# Patient Record
Sex: FEMALE | Race: WHITE | NOT HISPANIC OR LATINO | Employment: OTHER | URBAN - METROPOLITAN AREA
[De-identification: names, ages, dates, MRNs, and addresses within clinical notes are randomized per-mention and may not be internally consistent; named-entity substitution may affect disease eponyms.]

---

## 2019-08-13 RX ORDER — PENICILLIN V POTASSIUM 250 MG/1
TABLET ORAL
COMMUNITY
End: 2019-08-14 | Stop reason: ALTCHOICE

## 2019-08-13 RX ORDER — HYDROCHLOROTHIAZIDE 12.5 MG/1
CAPSULE, GELATIN COATED ORAL DAILY
COMMUNITY
End: 2019-09-03 | Stop reason: SDUPTHER

## 2019-08-13 RX ORDER — NITROFURANTOIN 25; 75 MG/1; MG/1
CAPSULE ORAL EVERY 12 HOURS
COMMUNITY
End: 2019-08-14 | Stop reason: ALTCHOICE

## 2019-08-13 RX ORDER — FLUTICASONE PROPIONATE 50 MCG
SPRAY, SUSPENSION (ML) NASAL DAILY
COMMUNITY
End: 2019-08-14 | Stop reason: ALTCHOICE

## 2019-08-13 RX ORDER — PREDNISOLONE ACETATE 10 MG/ML
SUSPENSION/ DROPS OPHTHALMIC
COMMUNITY
End: 2019-08-14 | Stop reason: ALTCHOICE

## 2019-08-13 RX ORDER — CLOBETASOL PROPIONATE 0.5 MG/G
OINTMENT TOPICAL
COMMUNITY
End: 2020-07-14 | Stop reason: SDUPTHER

## 2019-08-14 ENCOUNTER — OFFICE VISIT (OUTPATIENT)
Dept: FAMILY MEDICINE CLINIC | Facility: CLINIC | Age: 69
End: 2019-08-14
Payer: COMMERCIAL

## 2019-08-14 VITALS
TEMPERATURE: 97.9 F | HEIGHT: 62 IN | BODY MASS INDEX: 28.89 KG/M2 | DIASTOLIC BLOOD PRESSURE: 70 MMHG | OXYGEN SATURATION: 95 % | HEART RATE: 78 BPM | WEIGHT: 157 LBS | SYSTOLIC BLOOD PRESSURE: 160 MMHG

## 2019-08-14 DIAGNOSIS — Z00.00 MEDICARE ANNUAL WELLNESS VISIT, SUBSEQUENT: ICD-10-CM

## 2019-08-14 DIAGNOSIS — E78.2 MIXED HYPERLIPIDEMIA: Primary | ICD-10-CM

## 2019-08-14 DIAGNOSIS — Z12.31 OTHER SCREENING MAMMOGRAM: ICD-10-CM

## 2019-08-14 DIAGNOSIS — I10 BENIGN ESSENTIAL HYPERTENSION: ICD-10-CM

## 2019-08-14 DIAGNOSIS — Z11.59 NEED FOR HEPATITIS C SCREENING TEST: ICD-10-CM

## 2019-08-14 DIAGNOSIS — R32 URINARY INCONTINENCE, UNSPECIFIED TYPE: ICD-10-CM

## 2019-08-14 DIAGNOSIS — Z12.12 SCREENING FOR COLORECTAL CANCER: ICD-10-CM

## 2019-08-14 DIAGNOSIS — Z12.11 SCREENING FOR COLORECTAL CANCER: ICD-10-CM

## 2019-08-14 PROCEDURE — 1125F AMNT PAIN NOTED PAIN PRSNT: CPT | Performed by: FAMILY MEDICINE

## 2019-08-14 PROCEDURE — 1101F PT FALLS ASSESS-DOCD LE1/YR: CPT | Performed by: FAMILY MEDICINE

## 2019-08-14 PROCEDURE — 1160F RVW MEDS BY RX/DR IN RCRD: CPT | Performed by: FAMILY MEDICINE

## 2019-08-14 PROCEDURE — 1036F TOBACCO NON-USER: CPT | Performed by: FAMILY MEDICINE

## 2019-08-14 PROCEDURE — 1170F FXNL STATUS ASSESSED: CPT | Performed by: FAMILY MEDICINE

## 2019-08-14 PROCEDURE — 99204 OFFICE O/P NEW MOD 45 MIN: CPT | Performed by: FAMILY MEDICINE

## 2019-08-14 PROCEDURE — 3008F BODY MASS INDEX DOCD: CPT | Performed by: FAMILY MEDICINE

## 2019-08-14 PROCEDURE — 3725F SCREEN DEPRESSION PERFORMED: CPT | Performed by: FAMILY MEDICINE

## 2019-08-14 PROCEDURE — G0439 PPPS, SUBSEQ VISIT: HCPCS | Performed by: FAMILY MEDICINE

## 2019-08-14 RX ORDER — AMLODIPINE BESYLATE 5 MG/1
5 TABLET ORAL DAILY
COMMUNITY
End: 2019-09-03 | Stop reason: SDUPTHER

## 2019-08-14 RX ORDER — PRAVASTATIN SODIUM 20 MG
20 TABLET ORAL DAILY
COMMUNITY
End: 2019-09-03 | Stop reason: SDUPTHER

## 2019-08-14 NOTE — PROGRESS NOTES
Kelly Ceballos is here for her Initial Wellness visit  Health Risk Assessment:  Patient rates overall health as excellent  Patient feels that their physical health rating is Same  Eyesight was rated as Same  Hearing was rated as Same  Patient feels that their emotional and mental health rating is Same  Pain experienced by patient in the last 7 days has been None  Patient states that she has experienced no weight loss or gain in last 6 months  Emotional/Mental Health:  Patient has not been feeling nervous/anxious  Broken Bones/Falls: Fall Risk Assessment:    In the past year, patient has experienced: No history of falling in past year          Bladder/Bowel:  Patient has leaked urine accidently in the last six months  Patient reports no loss of bowel control  (Additional Comments: Urine leakage was address and on medication )    Immunizations:  Patient has not had a flu vaccination within the last year  Patient has received a pneumonia shot  Patient has not received a shingles shot  Patient has received tetanus/diphtheria shot  Date of tetanus/diphtheria shot: 11/26/2013    Home Safety:  Patient does not have trouble with stairs inside or outside of their home  Patient currently reports that there are no safety hazards present in home, working smoke alarms, working carbon monoxide detectors  Preventative Screenings:   Breast cancer screening performed, colon cancer screen completed, cholesterol screen completed, glaucoma eye exam completed,     Nutrition:  Current diet: Regular with servings of the following:    Medications:  Patient is currently taking over-the-counter supplements  List of OTC medications includes: multi   Patient is able to manage medications  Lifestyle Choices:  Patient reports no tobacco use  Patient has not smoked or used tobacco in the past   Patient reports alcohol use  Alcohol use per week: socially  Patient drives a vehicle  Patient wears seat belt  Current level of exercise of physical activity described by patient as: moderate  Activities of Daily Living:  Can get out of bed by his or her self, able to dress self, able to make own meals, able to do own shopping, able to bathe self, can do own laundry/housekeeping, can manage own money, pay bills and track expenses    Previous Hospitalizations:  No hospitalization or ED visit in past 12 months        Advanced Directives:  Patient has decided on a power of   Patient has spoken to designated power of   Patient has completed advanced directive  Preventative Screening/Counseling:      Cardiovascular:      General: Risks and Benefits Discussed and Screening Current          Diabetes:      General: Risks and Benefits Discussed      Counseling: Healthy Diet and Healthy Weight      Due for labs: Blood Glucose          Colorectal Cancer:      General: Risks and Benefits Discussed      Due for studies: Fecal Occult Blood          Breast Cancer:      General: Risks and Benefits Discussed          Cervical Cancer:      General: Screening Not Indicated      Comments: No hx abnml periods        Osteoporosis:      General: Risks and Benefits Discussed and Patient Declines      Counseling: Calcium and Vitamin D Intake and Regular Weightbearing Exercise          AAA:      General: Screening Not Indicated          Glaucoma:      General: Screening Not Indicated          HIV:      General: Screening Not Indicated          Hepatitis C:      General: Risks and Benefits Discussed      Counseling: has received general HCV counseling        Advanced Directives:   Patient has living will for healthcare, patient has an advanced directive       Immunizations:  Patient reviewed and up to date

## 2019-08-14 NOTE — PATIENT INSTRUCTIONS
Continue healthy diet and exercise  Obtain fasting labs  Schedule mammo  Do FIT testing  Continue monitoring blood pressure  Obesity   AMBULATORY CARE:   Obesity  is when your body mass index (BMI) is greater than 30  Your healthcare provider will use your height and weight to measure your BMI  The risks of obesity include  many health problems, such as injuries or physical disability  You may need tests to check for the following:  · Diabetes     · High blood pressure or high cholesterol     · Heart disease     · Gallbladder or liver disease     · Cancer of the colon, breast, prostate, liver, or kidney     · Sleep apnea     · Arthritis or gout  Seek care immediately if:   · You have a severe headache, confusion, or difficulty speaking  · You have weakness on one side of your body  · You have chest pain, sweating, or shortness of breath  Contact your healthcare provider if:   · You have symptoms of gallbladder or liver disease, such as pain in your upper abdomen  · You have knee or hip pain and discomfort while walking  · You have symptoms of diabetes, such as intense hunger and thirst, and frequent urination  · You have symptoms of sleep apnea, such as snoring or daytime sleepiness  · You have questions or concerns about your condition or care  Treatment for obesity  focuses on helping you lose weight to improve your health  Even a small decrease in BMI can reduce the risk for many health problems  Your healthcare provider will help you set a weight-loss goal   · Lifestyle changes  are the first step in treating obesity  These include making healthy food choices and getting regular physical activity  Your healthcare provider may suggest a weight-loss program that involves coaching, education, and therapy  · Medicine  may help you lose weight when it is used with a healthy diet and physical activity       · Surgery  can help you lose weight if you are very obese and have other health problems  There are several types of weight-loss surgery  Ask your healthcare provider for more information  Be successful losing weight:   · Set small, realistic goals  An example of a small goal is to walk for 20 minutes 5 days a week  Anther goal is to lose 5% of your body weight  · Tell friends, family members, and coworkers about your goals  and ask for their support  Ask a friend to lose weight with you, or join a weight-loss support group  · Identify foods or triggers that may cause you to overeat , and find ways to avoid them  Remove tempting high-calorie foods from your home and workplace  Place a bowl of fresh fruit on your kitchen counter  If stress causes you to eat, then find other ways to cope with stress  · Keep a diary to track what you eat and drink  Also write down how many minutes of physical activity you do each day  Weigh yourself once a week and record it in your diary  Eating changes: You will need to eat 500 to 1,000 fewer calories each day than you currently eat to lose 1 to 2 pounds a week  The following changes will help you cut calories:  · Eat smaller portions  Use small plates, no larger than 9 inches in diameter  Fill your plate half full of fruits and vegetables  Measure your food using measuring cups until you know what a serving size looks like  · Eat 3 meals and 1 or 2 snacks each day  Plan your meals in advance  Celia Stalls and eat at home most of the time  Eat slowly  · Eat fruits and vegetables at every meal   They are low in calories and high in fiber, which makes you feel full  Do not add butter, margarine, or cream sauce to vegetables  Use herbs to season steamed vegetables  · Eat less fat and fewer fried foods  Eat more baked or grilled chicken and fish  These protein sources are lower in calories and fat than red meat  Limit fast food  Dress your salads with olive oil and vinegar instead of bottled dressing       · Limit the amount of sugar you eat   Do not drink sugary beverages  Limit alcohol  Activity changes:  Physical activity is good for your body in many ways  It helps you burn calories and build strong muscles  It decreases stress and depression, and improves your mood  It can also help you sleep better  Talk to your healthcare provider before you begin an exercise program   · Exercise for at least 30 minutes 5 days a week  Start slowly  Set aside time each day for physical activity that you enjoy and that is convenient for you  It is best to do both weight training and an activity that increases your heart rate, such as walking, bicycling, or swimming  · Find ways to be more active  Do yard work and housecleaning  Walk up the stairs instead of using elevators  Spend your leisure time going to events that require walking, such as outdoor festivals or fairs  This extra physical activity can help you lose weight and keep it off  Follow up with your healthcare provider as directed: You may need to meet with a dietitian  Write down your questions so you remember to ask them during your visits  © 2017 [facility] [facility] Information is for End User's use only and may not be sold, redistributed or otherwise used for commercial purposes  All illustrations and images included in CareNotes® are the copyrighted property of A D A M , Inc  or [employer]  The above information is an  only  It is not intended as medical advice for individual conditions or treatments  Talk to your doctor, nurse or pharmacist before following any medical regimen to see if it is safe and effective for you  Urinary Incontinence   WHAT YOU NEED TO KNOW:   What is urinary incontinence? Urinary incontinence (UI) is when you lose control of your bladder  What causes UI? UI occurs because your bladder cannot store or empty urine properly   The following are the most common types of UI:  · Stress incontinence  is when you leak urine due to increased bladder pressure  This may happen when you cough, sneeze, or exercise  · Urge incontinence  is when you feel the need to urinate right away and leak urine accidentally  · Mixed incontinence  is when you have both stress and urge UI  What are the signs and symptoms of UI?   · You feel like your bladder does not empty completely when you urinate  · You urinate often and need to urinate immediately  · You leak urine when you sleep, or you wake up with the urge to urinate  · You leak urine when you cough, sneeze, exercise, or laugh  How is UI diagnosed? Your healthcare provider will ask how often you leak urine and whether you have stress or urge symptoms  Tell him which medicines you take, how often you urinate, and how much liquid you drink each day  You may need any of the following tests:  · Urine tests  may show infection or kidney function  · A pelvic exam  may be done to check for blockages  A pelvic exam will also show if your bladder, uterus, or other organs have moved out of place  · An x-ray, ultrasound, or CT  may show problems with parts of your urinary system  You may be given contrast liquid to help your organs show up better in the pictures  Tell the healthcare provider if you have ever had an allergic reaction to contrast liquid  Do not enter the MRI room with anything metal  Metal can cause serious injury  Tell the healthcare provider if you have any metal in or on your body  · A bladder scan  will show how much urine is left in your bladder after you urinate  You will be asked to urinate and then healthcare providers will use a small ultrasound machine to check the urine left in your bladder  · Cystometry  is used to check the function of your urinary system  Your healthcare provider checks the pressure in your bladder while filling it with fluid  Your bladder pressure may also be tested when your bladder is full and while you urinate  How is UI treated? · Medicines  can help strengthen your bladder control  · Electrical stimulation  is used to send a small amount of electrical energy to your pelvic floor muscles  This helps control your bladder function  Electrodes may be placed outside your body or in your rectum  For women, the electrodes may be placed in the vagina  · A bulking agent  may be injected into the wall of your urethra to make it thicker  This helps keep your urethra closed and decreases urine leakage  · Devices  such as a clamp, pessary, or tampon may help stop urine leaks  Ask your healthcare provider for more information about these and other devices  · Surgery  may be needed if other treatments do not work  Several types of surgery can help improve your bladder control  Ask your healthcare provider for more information about the surgery you may need  How can I manage my symptoms? · Do pelvic muscle exercises often  Your pelvic muscles help you stop urinating  Squeeze these muscles tight for 5 seconds, then relax for 5 seconds  Gradually work up to squeezing for 10 seconds  Do 3 sets of 15 repetitions a day, or as directed  This will help strengthen your pelvic muscles and improve bladder control  · A catheter  may be used to help empty your bladder  A catheter is a tiny, plastic tube that is put into your bladder to drain your urine  Your healthcare provider may tell you to use a catheter to prevent your bladder from getting too full and leaking urine  · Keep a UI record  Write down how often you leak urine and how much you leak  Make a note of what you were doing when you leaked urine  · Train your bladder  Go to the bathroom at set times, such as every 2 hours, even if you do not feel the urge to go  You can also try to hold your urine when you feel the urge to go  For example, hold your urine for 5 minutes when you feel the urge to go  As that becomes easier, hold your urine for 10 minutes       · Drink liquids as directed  Ask your healthcare provider how much liquid to drink each day and which liquids are best for you  You may need to limit the amount of liquid you drink to help control your urine leakage  Limit or do not have drinks that contain caffeine or alcohol  Do not drink any liquid right before you go to bed  · Prevent constipation  Eat a variety of high-fiber foods  Good examples are high-fiber cereals, beans, vegetables, and whole-grain breads  Prune juice may help make your bowel movement softer  Walking is the best way to trigger your intestines to have a bowel movement  · Exercise regularly and maintain a healthy weight  Ask your healthcare provider how much you should weigh and about the best exercise plan for you  Weight loss and exercise will decrease pressure on your bladder and help you control your leakage  Ask him to help you create a weight loss plan if you are overweight  When should I seek immediate care? · You have severe pain  · You are confused or cannot think clearly  When should I contact my healthcare provider? · You have a fever  · You see blood in your urine  · You have pain when you urinate  · You have new or worse pain, even after treatment  · Your mouth feels dry or you have vision changes  · Your urine is cloudy or smells bad  · You have questions or concerns about your condition or care  CARE AGREEMENT:   You have the right to help plan your care  Learn about your health condition and how it may be treated  Discuss treatment options with your caregivers to decide what care you want to receive  You always have the right to refuse treatment  The above information is an  only  It is not intended as medical advice for individual conditions or treatments  Talk to your doctor, nurse or pharmacist before following any medical regimen to see if it is safe and effective for you    © 2017 Kellie[employer]had[employer] Information is for End User's use only and may not be sold, redistributed or otherwise used for commercial purposes  All illustrations and images included in CareNotes® are the copyrighted property of A D A M , Inc  or [employer]ack [employer]  Cigarette Smoking and Your Health   AMBULATORY CARE:   Risks to your health if you smoke:  Nicotine and other chemicals found in tobacco damage every cell in your body  Even if you are a light smoker, you have an increased risk for cancer, heart disease, and lung disease  If you are pregnant or have diabetes, smoking increases your risk for complications  Benefits to your health if you stop smoking:   · You decrease respiratory symptoms such as coughing, wheezing, and shortness of breath  · You reduce your risk for cancers of the lung, mouth, throat, kidney, bladder, pancreas, stomach, and cervix  If you already have cancer, you increase the benefits of chemotherapy  You also reduce your risk for cancer returning or a second cancer from developing  · You reduce your risk for heart disease, blood clots, heart attack, and stroke  · You reduce your risk for lung infections, and diseases such as pneumonia, asthma, chronic bronchitis, and emphysema  · Your circulation improves  More oxygen can be delivered to your body  If you have diabetes, you lower your risk for complications, such as kidney, artery, and eye diseases  You also lower your risk for nerve damage  Nerve damage can lead to amputations, poor vision, and blindness  · You improve your body's ability to heal and to fight infections  Benefits to the health of others if you stop smoking:  Tobacco is harmful to nonsmokers who breathe in your secondhand smoke  The following are ways the health of others around you may improve when you stop smoking:  · You lower the risks for lung cancer and heart disease in nonsmoking adults       · If you are pregnant, you lower the risk for miscarriage, early delivery, low birth weight, and stillbirth  You also lower your baby's risk for SIDS, obesity, developmental delay, and neurobehavioral problems, such as ADHD  · If you have children, you lower their risk for ear infections, colds, pneumonia, bronchitis, and asthma  For more information and support to stop smoking:   · Smokefree  gov  Phone: 2- 521 - 775-4325  Web Address: [employer]  Follow up with your healthcare provider as directed:  Write down your questions so you remember to ask them during your visits  © 2017 [address] [facility] Information is for End User's use only and may not be sold, redistributed or otherwise used for commercial purposes  All illustrations and images included in CareNotes® are the copyrighted property of A D A M , Inc  or [employer]  The above information is an  only  It is not intended as medical advice for individual conditions or treatments  Talk to your doctor, nurse or pharmacist before following any medical regimen to see if it is safe and effective for you  Fall Prevention   AMBULATORY CARE:   Fall prevention  includes ways to make your home and other areas safer  It also includes ways you can move more carefully to prevent a fall  Health conditions that cause changes in your blood pressure, vision, or muscle strength and coordination may increase your risk for falls  Medicines may also increase your risk for falls if they make you dizzy, weak, or sleepy  Call 911 or have someone else call if:   · You have fallen and are unconscious  · You have fallen and cannot move part of your body  Contact your healthcare provider if:   · You have fallen and have pain or a headache  · You have questions or concerns about your condition or care  Fall prevention tips:   · Stand or sit up slowly  This may help you keep your balance and prevent falls  · Use assistive devices as directed    Your healthcare provider may suggest that you use a cane or walker to help you keep your balance  You may need to have grab bars put in your bathroom near the toilet or in the shower  · Wear shoes that fit well and have soles that   Wear shoes both inside and outside  Use slippers with good   Do not wear shoes with high heels  · Wear a personal alarm  This is a device that allows you to call 911 if you fall and need help  Ask your healthcare provider for more information  · Stay active  Exercise can help strengthen your muscles and improve your balance  Your healthcare provider may recommend water aerobics or walking  He or she may also recommend physical therapy to improve your coordination  Never start an exercise program without talking to your healthcare provider first      · Manage your medical conditions  Keep all appointments with your healthcare providers  Visit your eye doctor as directed  Home safety tips:   · Add items to prevent falls in the bathroom  Put nonslip strips on your bath or shower floor to prevent you from slipping  Use a bath mat if you do not have carpet in the bathroom  This will prevent you from falling when you step out of the bath or shower  Use a shower seat so you do not need to stand while you shower  Sit on the toilet or a chair in your bathroom to dry yourself and put on clothing  This will prevent you from losing your balance from drying or dressing yourself while you are standing  · Keep paths clear  Remove books, shoes, and other objects from walkways and stairs  Place cords for telephones and lamps out of the way so that you do not need to walk over them  Tape them down if you cannot move them  Remove small rugs  If you cannot remove a rug, secure it with double-sided tape  This will prevent you from tripping  · Install bright lights in your home  Use night lights to help light paths to the bathroom or kitchen  Always turn on the light before you start walking      · Keep items you use often on shelves within reach  Do not use a step stool to help you reach an item  · Paint or place reflective tape on the edges of your stairs  This will help you see the stairs better  Follow up with your healthcare provider as directed:  Write down your questions so you remember to ask them during your visits  © 2017 [address] [employer] Information is for End User's use only and may not be sold, redistributed or otherwise used for commercial purposes  All illustrations and images included in CareNotes® are the copyrighted property of A D A M , Inc  or [employer]ack Real  The above information is an  only  It is not intended as medical advice for individual conditions or treatments  Talk to your doctor, nurse or pharmacist before following any medical regimen to see if it is safe and effective for you  Advance Directives   WHAT YOU NEED TO KNOW:   What are advance directives? Advance directives are legal documents that state your wishes and plans for medical care  These plans are made ahead of time in case you lose your ability to make decisions for yourself  Advance directives can apply to any medical decision, such as the treatments you want, and if you want to donate organs  What are the types of advance directives? There are many types of advance directives, and each state has rules about how to use them  You may choose a combination of any of the following:  · Living will: This is a written record of the treatment you want  You can also choose which treatments you do not want, which to limit, and which to stop at a certain time  This includes surgery, medicine, IV fluid, and tube feedings  · Durable power of  for healthcare [facility]): This is a written record that states who you want to make healthcare choices for you when you are unable to make them for yourself  This person, called a proxy, is usually a family member or a friend   You may choose more than 1 proxy     · Do not resuscitate (DNR) order:  A DNR order is used in case your heart stops beating or you stop breathing  It is a request not to have certain forms of treatment, such as CPR  A DNR order may be included in other types of advance directives  · Medical directive: This covers the care that you want if you are in a coma, near death, or unable to make decisions for yourself  You can list the treatments you want for each condition  Treatment may include pain medicine, surgery, blood transfusions, dialysis, IV or tube feedings, and a ventilator (breathing machine)  · Values history: This document has questions about your views, beliefs, and how you feel and think about life  This information can help others choose the care that you would choose  Why are advance directives important? An advance directive helps you control your care  Although spoken wishes may be used, it is better to have your wishes written down  Spoken wishes can be misunderstood, or not followed  Treatments may be given even if you do not want them  An advance directive may make it easier for your family to make difficult choices about your care  How do I decide what to put in my advance directives? · Make informed decisions:  Make sure you fully understand treatments or care you may receive  Think about the benefits and problems your decisions could cause for you or your family  Talk to healthcare providers if you have concerns or questions before you write down your wishes  You may also want to talk with your Judaism or , or a   Check your state laws to make sure that what you put in your advance directive is legal      · Sign all forms:  Sign and date your advance directive when you have finished  You may also need 2 witnesses to sign the forms  Witnesses cannot be your doctor or his staff, your spouse, heirs or beneficiaries, people you owe money to, or your chosen proxy   Talk to your family, proxy, and healthcare providers about your advance directive  Give each person a copy, and keep one for yourself in a place you can get to easily  Do not keep it hidden or locked away  · Review and revise your plans: You can revise your advance directive at any time, as long as you are able to make decisions  Review your plan every year, and when there are changes in your life, or your health  When you make changes, let your family, proxy, and healthcare providers know  Give each a new copy  Where can I find more information? · [employer]  [address] , [address]  Phone: 8- 278 - 805-3732  Phone: 1- 170 - 070-6377  Web Address: http://www  aafp org  · [address] [facility])  [address] [facility], [address]  Phone: 2- 194 - [phone]  Phone: [phone]  Web Address: Francois chavez  CARE AGREEMENT:   You have the right to help plan your care  To help with this plan, you must learn about your health condition and treatment options  You must also learn about advance directives and how they are used  Work with your healthcare providers to decide what care will be used to treat you  You always have the right to refuse treatment  The above information is an  only  It is not intended as medical advice for individual conditions or treatments  Talk to your doctor, nurse or pharmacist before following any medical regimen to see if it is safe and effective for you  © 2017 [address] [facility] Information is for End User's use only and may not be sold, redistributed or otherwise used for commercial purposes  All illustrations and images included in CareNotes® are the copyrighted property of A D A M , Inc  or Zack Real

## 2019-08-14 NOTE — PROGRESS NOTES
Assessment/Plan:    No problem-specific Assessment & Plan notes found for this encounter  Diagnoses and all orders for this visit:    Mixed hyperlipidemia  Comments:  check lipids  on pravastatin for now    Orders:  -     Lipid panel; Future    Benign essential hypertension  Comments:  known white coat HTN  checks pressures at home, and all are <140/90  continue current meds  Labs as ordered    Orders:  -     CBC and differential; Future  -     Comprehensive metabolic panel; Future  -     TSH, 3rd generation; Future    Urinary incontinence, unspecified type  Comments:  doing well on myrbetriq  continue same      Other orders  -     Discontinue: nitrofurantoin (MACROBID) 100 mg capsule; every 12 (twelve) hours  -     Discontinue: penicillin V potassium (VEETID) 250 mg tablet; penicillin V potassium 250 mg tablet  -     Discontinue: prednisoLONE acetate (PRED FORTE) 1 % ophthalmic suspension; prednisolone acetate 1 % eye drops,suspension  -     Discontinue: fluticasone (FLONASE) 50 mcg/act nasal spray; Daily  -     hydrochlorothiazide (MICROZIDE) 12 5 mg capsule; Daily  -     Discontinue: tuberculin (TUBERSOL) 5 units/0 1 mL; 0 1 mL Daily  -     clobetasol (TEMOVATE) 0 05 % ointment; clobetasol 0 05 % topical ointment  -     Cancel: Ambulatory referral to Colorectal Surgery; Future  -     amLODIPine (NORVASC) 5 mg tablet; Take 5 mg by mouth daily  -     Mirabegron ER (MYRBETRIQ) 50 MG TB24; Take 50 mg by mouth daily  -     pravastatin (PRAVACHOL) 20 mg tablet; Take 20 mg by mouth daily  -     aspirin 81 MG tablet; Take 81 mg by mouth Daily        Subjective:      Patient ID: Germain Santa is a 77 y o  female  HPI   pt presents for new pt visit and medicare wellness  +hx HLD, HTN which is well-controlled  Monitors blood pressure at home  Always high at doctor's office  No chest pain, shortness of breath  Tolerating meds  On myrbetriq for incontinence and frequency    She feels this works as well as anything she has used in the past and wants to continue same dosing  No dysuria, back pain  The following portions of the patient's history were reviewed and updated as appropriate: allergies, current medications, past family history, past medical history, past social history, past surgical history and problem list     Review of Systems   Constitutional: Negative for chills, fatigue, fever and unexpected weight change  HENT: Negative for congestion, ear pain, hearing loss, postnasal drip, rhinorrhea, sinus pressure, sinus pain, sore throat, trouble swallowing and voice change  Eyes: Negative for pain, redness and visual disturbance  Respiratory: Negative for cough and shortness of breath  Cardiovascular: Negative for chest pain, palpitations and leg swelling  Gastrointestinal: Negative for abdominal pain, constipation, diarrhea and nausea  Endocrine: Negative for cold intolerance, heat intolerance, polydipsia and polyuria  Genitourinary: Negative for dysuria, frequency and urgency  Musculoskeletal: Negative for arthralgias, joint swelling and myalgias  Skin: Negative for rash  No suspicious lesions   Neurological: Negative for weakness, numbness and headaches  Hematological: Negative for adenopathy  Objective:      /70 (BP Location: Left arm)   Pulse 78   Temp 97 9 °F (36 6 °C)   Ht 5' 2 05" (1 576 m)   Wt 71 2 kg (157 lb)   SpO2 95%   BMI 28 67 kg/m²          Physical Exam   Constitutional: She is oriented to person, place, and time  She appears well-developed and well-nourished  No distress  HENT:   Head: Normocephalic and atraumatic  Right Ear: Tympanic membrane, external ear and ear canal normal    Left Ear: Tympanic membrane, external ear and ear canal normal    Nose: Nose normal    Mouth/Throat: Oropharynx is clear and moist and mucous membranes are normal  No oropharyngeal exudate  Eyes: Pupils are equal, round, and reactive to light   Conjunctivae and EOM are normal    Neck: No JVD present  Carotid bruit is not present  No thyromegaly present  Cardiovascular: Regular rhythm, S1 normal and S2 normal  Exam reveals no gallop, no S3, no S4 and no friction rub  No murmur heard  Pulmonary/Chest: Effort normal and breath sounds normal  She has no wheezes  She has no rhonchi  She has no rales  Abdominal: Soft  Bowel sounds are normal  She exhibits no distension  There is no tenderness  Lymphadenopathy:     She has no cervical adenopathy  Neurological: She is alert and oriented to person, place, and time  She has normal strength and normal reflexes  No cranial nerve deficit or sensory deficit

## 2019-09-03 DIAGNOSIS — I10 ESSENTIAL HYPERTENSION: Primary | ICD-10-CM

## 2019-09-03 DIAGNOSIS — E78.2 MIXED HYPERLIPIDEMIA: ICD-10-CM

## 2019-09-03 RX ORDER — HYDROCHLOROTHIAZIDE 12.5 MG/1
12.5 CAPSULE, GELATIN COATED ORAL EVERY MORNING
Qty: 90 CAPSULE | Refills: 1 | Status: SHIPPED | OUTPATIENT
Start: 2019-09-03 | End: 2020-02-18 | Stop reason: SDUPTHER

## 2019-09-03 RX ORDER — AMLODIPINE BESYLATE 5 MG/1
5 TABLET ORAL DAILY
Qty: 90 TABLET | Refills: 1 | Status: SHIPPED | OUTPATIENT
Start: 2019-09-03 | End: 2020-02-18 | Stop reason: SDUPTHER

## 2019-09-03 RX ORDER — PRAVASTATIN SODIUM 20 MG
20 TABLET ORAL DAILY
Qty: 90 TABLET | Refills: 1 | Status: SHIPPED | OUTPATIENT
Start: 2019-09-03 | End: 2020-02-18 | Stop reason: SDUPTHER

## 2019-09-03 NOTE — TELEPHONE ENCOUNTER
Medication: Norvasc, Hydrochlorothiazide, Pravastatin   Last office visit: 8/14/19  Next office visit: Not due for 1 year   Labs: N/A  Last refilled: historical   Pharmacy: on file

## 2019-09-12 ENCOUNTER — APPOINTMENT (OUTPATIENT)
Dept: LAB | Facility: CLINIC | Age: 69
End: 2019-09-12
Payer: COMMERCIAL

## 2019-09-12 DIAGNOSIS — Z12.12 SCREENING FOR COLORECTAL CANCER: ICD-10-CM

## 2019-09-12 DIAGNOSIS — E78.2 MIXED HYPERLIPIDEMIA: ICD-10-CM

## 2019-09-12 DIAGNOSIS — Z12.11 SCREENING FOR COLORECTAL CANCER: ICD-10-CM

## 2019-09-12 DIAGNOSIS — I10 BENIGN ESSENTIAL HYPERTENSION: ICD-10-CM

## 2019-09-12 DIAGNOSIS — Z11.59 NEED FOR HEPATITIS C SCREENING TEST: ICD-10-CM

## 2019-09-12 LAB
ALBUMIN SERPL BCP-MCNC: 4.1 G/DL (ref 3.5–5)
ALP SERPL-CCNC: 75 U/L (ref 46–116)
ALT SERPL W P-5'-P-CCNC: 21 U/L (ref 12–78)
ANION GAP SERPL CALCULATED.3IONS-SCNC: 13 MMOL/L (ref 4–13)
AST SERPL W P-5'-P-CCNC: 16 U/L (ref 5–45)
BASOPHILS # BLD AUTO: 0.02 THOUSANDS/ΜL (ref 0–0.1)
BASOPHILS NFR BLD AUTO: 0 % (ref 0–1)
BILIRUB SERPL-MCNC: 0.5 MG/DL (ref 0.2–1)
BUN SERPL-MCNC: 11 MG/DL (ref 5–25)
CALCIUM SERPL-MCNC: 9.5 MG/DL (ref 8.3–10.1)
CHLORIDE SERPL-SCNC: 99 MMOL/L (ref 100–108)
CHOLEST SERPL-MCNC: 201 MG/DL (ref 50–200)
CO2 SERPL-SCNC: 30 MMOL/L (ref 21–32)
CREAT SERPL-MCNC: 0.82 MG/DL (ref 0.6–1.3)
EOSINOPHIL # BLD AUTO: 0.18 THOUSAND/ΜL (ref 0–0.61)
EOSINOPHIL NFR BLD AUTO: 4 % (ref 0–6)
ERYTHROCYTE [DISTWIDTH] IN BLOOD BY AUTOMATED COUNT: 12 % (ref 11.6–15.1)
GFR SERPL CREATININE-BSD FRML MDRD: 74 ML/MIN/1.73SQ M
GLUCOSE P FAST SERPL-MCNC: 96 MG/DL (ref 65–99)
HCT VFR BLD AUTO: 42.8 % (ref 34.8–46.1)
HCV AB SER QL: NORMAL
HDLC SERPL-MCNC: 77 MG/DL (ref 40–60)
HEMOCCULT STL QL IA: NEGATIVE
HGB BLD-MCNC: 13.9 G/DL (ref 11.5–15.4)
IMM GRANULOCYTES # BLD AUTO: 0.01 THOUSAND/UL (ref 0–0.2)
IMM GRANULOCYTES NFR BLD AUTO: 0 % (ref 0–2)
LDLC SERPL CALC-MCNC: 103 MG/DL (ref 0–100)
LYMPHOCYTES # BLD AUTO: 1.15 THOUSANDS/ΜL (ref 0.6–4.47)
LYMPHOCYTES NFR BLD AUTO: 25 % (ref 14–44)
MCH RBC QN AUTO: 29.8 PG (ref 26.8–34.3)
MCHC RBC AUTO-ENTMCNC: 32.5 G/DL (ref 31.4–37.4)
MCV RBC AUTO: 92 FL (ref 82–98)
MONOCYTES # BLD AUTO: 0.58 THOUSAND/ΜL (ref 0.17–1.22)
MONOCYTES NFR BLD AUTO: 13 % (ref 4–12)
NEUTROPHILS # BLD AUTO: 2.63 THOUSANDS/ΜL (ref 1.85–7.62)
NEUTS SEG NFR BLD AUTO: 58 % (ref 43–75)
NONHDLC SERPL-MCNC: 124 MG/DL
NRBC BLD AUTO-RTO: 0 /100 WBCS
PLATELET # BLD AUTO: 262 THOUSANDS/UL (ref 149–390)
PMV BLD AUTO: 9.9 FL (ref 8.9–12.7)
POTASSIUM SERPL-SCNC: 3.6 MMOL/L (ref 3.5–5.3)
PROT SERPL-MCNC: 7.6 G/DL (ref 6.4–8.2)
RBC # BLD AUTO: 4.66 MILLION/UL (ref 3.81–5.12)
SODIUM SERPL-SCNC: 142 MMOL/L (ref 136–145)
TRIGL SERPL-MCNC: 103 MG/DL
TSH SERPL DL<=0.05 MIU/L-ACNC: 2.19 UIU/ML (ref 0.36–3.74)
WBC # BLD AUTO: 4.57 THOUSAND/UL (ref 4.31–10.16)

## 2019-09-12 PROCEDURE — 85025 COMPLETE CBC W/AUTO DIFF WBC: CPT

## 2019-09-12 PROCEDURE — 80053 COMPREHEN METABOLIC PANEL: CPT

## 2019-09-12 PROCEDURE — 86803 HEPATITIS C AB TEST: CPT

## 2019-09-12 PROCEDURE — 36415 COLL VENOUS BLD VENIPUNCTURE: CPT

## 2019-09-12 PROCEDURE — 80061 LIPID PANEL: CPT

## 2019-09-12 PROCEDURE — 84443 ASSAY THYROID STIM HORMONE: CPT

## 2019-09-12 PROCEDURE — G0328 FECAL BLOOD SCRN IMMUNOASSAY: HCPCS

## 2019-10-16 DIAGNOSIS — R32 URINARY INCONTINENCE, UNSPECIFIED TYPE: ICD-10-CM

## 2019-10-16 DIAGNOSIS — I10 BENIGN ESSENTIAL HYPERTENSION: Primary | ICD-10-CM

## 2019-10-16 NOTE — TELEPHONE ENCOUNTER
Medication: Myrbetriq 50 mg   Last refilled: 8/14/19  Last Office Visit: 8/14/419  Next Office Visit: None   Pharmacy:

## 2019-12-17 ENCOUNTER — HOSPITAL ENCOUNTER (OUTPATIENT)
Dept: RADIOLOGY | Facility: HOSPITAL | Age: 69
Discharge: HOME | End: 2019-12-17
Attending: FAMILY MEDICINE
Payer: COMMERCIAL

## 2019-12-17 ENCOUNTER — OFFICE VISIT (OUTPATIENT)
Dept: FAMILY MEDICINE CLINIC | Facility: CLINIC | Age: 69
End: 2019-12-17
Payer: COMMERCIAL

## 2019-12-17 VITALS
SYSTOLIC BLOOD PRESSURE: 136 MMHG | HEART RATE: 90 BPM | DIASTOLIC BLOOD PRESSURE: 80 MMHG | OXYGEN SATURATION: 98 % | WEIGHT: 157.2 LBS | BODY MASS INDEX: 28.93 KG/M2 | TEMPERATURE: 97.9 F | HEIGHT: 62 IN | RESPIRATION RATE: 12 BRPM

## 2019-12-17 DIAGNOSIS — M25.551 RIGHT HIP PAIN: Primary | ICD-10-CM

## 2019-12-17 DIAGNOSIS — M25.551 RIGHT HIP PAIN: ICD-10-CM

## 2019-12-17 PROCEDURE — 3008F BODY MASS INDEX DOCD: CPT | Performed by: FAMILY MEDICINE

## 2019-12-17 PROCEDURE — 99213 OFFICE O/P EST LOW 20 MIN: CPT | Performed by: FAMILY MEDICINE

## 2019-12-17 PROCEDURE — 1160F RVW MEDS BY RX/DR IN RCRD: CPT | Performed by: FAMILY MEDICINE

## 2019-12-17 PROCEDURE — 73502 X-RAY EXAM HIP UNI 2-3 VIEWS: CPT

## 2019-12-17 PROCEDURE — 1036F TOBACCO NON-USER: CPT | Performed by: FAMILY MEDICINE

## 2019-12-17 NOTE — PROGRESS NOTES
Assessment/Plan:    No problem-specific Assessment & Plan notes found for this encounter  Diagnoses and all orders for this visit:    Right hip pain  Comments:  nml exam--suspect strain  check xray  refer to PT; if no improvement, refer to ortho  Orders:  -     XR hip/pelv 2-3 vws left if performed; Future  -     Ambulatory referral to Physical Therapy; Future        Subjective:      Patient ID: Nataliia Young is a 77 y o  female  HPI  L hip pain x3 months  Pain is achy and worse at night  Isn't present all the time  Pain isn't worse with weight-bearing  Felt a little unstable initially, but that sensation is gone  She has moved and is using more stairs and is more physically active  Started the gym in September as well  Ibuprofen at night has been mildly helpful but not fully  No paresthesias or weakness    The following portions of the patient's history were reviewed and updated as appropriate: allergies, current medications, past family history, past medical history, past social history, past surgical history and problem list     Review of Systems    See HPI; all other systems negative  Objective:      /80   Pulse 90   Temp 97 9 °F (36 6 °C)   Resp 12   Ht 5' 2 05" (1 576 m)   Wt 71 3 kg (157 lb 3 2 oz)   SpO2 98%   BMI 28 71 kg/m²          Physical Exam   Constitutional: She is oriented to person, place, and time  Musculoskeletal: Normal range of motion  She exhibits no edema or deformity  Neurological: She is alert and oriented to person, place, and time  She displays normal reflexes  No sensory deficit

## 2020-01-16 ENCOUNTER — EVALUATION (OUTPATIENT)
Dept: PHYSICAL THERAPY | Facility: CLINIC | Age: 70
End: 2020-01-16
Payer: COMMERCIAL

## 2020-01-16 DIAGNOSIS — M70.62 GREATER TROCHANTERIC BURSITIS OF LEFT HIP: Primary | ICD-10-CM

## 2020-01-16 DIAGNOSIS — M25.552 LEFT HIP PAIN: ICD-10-CM

## 2020-01-16 PROCEDURE — 97162 PT EVAL MOD COMPLEX 30 MIN: CPT | Performed by: PHYSICAL THERAPIST

## 2020-01-16 PROCEDURE — 97110 THERAPEUTIC EXERCISES: CPT | Performed by: PHYSICAL THERAPIST

## 2020-01-16 NOTE — PROGRESS NOTES
PT Evaluation     Today's date: 2020  Patient name: Ronald Cabral  : 1950  MRN: [id number]  Referring provider: Cira Moctezuma DO  Dx:   Encounter Diagnosis     ICD-10-CM    1  Greater trochanteric bursitis of left hip M70 62    2  Left hip pain M25 552 Ambulatory referral to Physical Therapy    nml exam--suspect strain  check xray  refer to PT; if no improvement, refer to ortho                  Assessment  Assessment details: Pt presents with signs and symptoms synonymous of L hip greater trochanteric bursitis  Pt presents with pain, decreased strength, decreased flexibility as well as tolerance to activity and postural awareness  Pt would benefit skilled PT intervention in order to address these impairments in order to be able to perform all desired activities with minimal to nil symptom exacerbation  Thank you very much for this referral      Impairments: abnormal or restricted ROM, activity intolerance, impaired balance, impaired physical strength, lacks appropriate home exercise program, pain with function and poor posture   Understanding of Dx/Px/POC: good   Prognosis: good    Goals  STG 4 Weeks:  Decrease pain at worst to 4/10  Improve Flexibility to (-) LAINEY  Improve strength to 4- hip, TA Draw 20"  Independent with HEP  LTG 8 Weeks:  Decrease pain at worst to 2/10  Improve strength to 4/5 hip   TA draw 30"  Able to perform all desired activities with minimal to nil symptom exacerbation      Plan  Patient would benefit from: skilled physical therapy  Planned modality interventions: cryotherapy, TENS and thermotherapy: hydrocollator packs  Planned therapy interventions: joint mobilization, manual therapy, abdominal trunk stabilization, neuromuscular re-education, patient education, postural training, strengthening, stretching, therapeutic activities, therapeutic exercise, home exercise program, graded motor, graded exercise, graded activity, gait training, functional ROM exercises and flexibility  Frequency: 2x week  Duration in weeks: 8  Treatment plan discussed with: patient        Subjective Evaluation    History of Present Illness  Date of onset: 2020  Mechanism of injury: Pt is a 72 yofemale who is presents today stating that she developed L hip pain that developed insidiously about 4 months ago  Pt reports she thinks there could be 2 reasons, she moved to the area last summer and began exercising in September and then in the new home that has steps where she didn't have them before  Pt reports that the she held off on seeing medical care as it progressively got so bad, she entered the month of November where the pain became so intense she was nervous to even begin initiating walking up stairs without using the uninvolved injury  The holidays rolled through and she met with her family physician right before [place] who encouraged to get x-ray and try PT intervention  Pt reports that X-ray was done and she was only positive with for mild degenerative process but nothing significant  Pt reports that there are periods of time without pain but at worst she will take medication and it will ache her up to a 6/10  She reports that that it initially started as a belt around her low back and wrapped around L > R, but not seem to be pretty focal on the outside of her hip  Pt reports pain at worst is caused by laying on the L side, and performing stairs  Pt reports her goals at this time are to be able to return to recreational exercise, get rid of her pain  No numbness or tingling in her legs and no change in bowel or bladder  Pt denies having difficulty falling asleep because of this but has not recently waken up because of it  Pt reports having a cyst removal on L1-2 in , but denies other history of back pain     Quality of life: good    Pain  Current pain ratin  At best pain ratin  At worst pain ratin  Quality: dull ache  Relieving factors: medications and change in position  Aggravating factors: walking and stair climbing  Progression: improved      Diagnostic Tests  X-ray: normal  Treatments  Previous treatment: medication  Patient Goals  Patient goals for therapy: decreased pain, increased motion, increased strength and return to sport/leisure activities          Objective     Active Range of Motion     Lumbar   Flexion: 60 degrees   Extension: 10 degrees   Left lateral flexion: 5 degrees       Right lateral flexion: 5 degrees   Left rotation: 75 degrees   Right rotation: 75 degrees     Additional Active Range of Motion Details  Forward head, rounded shoulders, mild decrease Lordosis  B/L Sensation intact to L3,4,5,S1,S2  Postural correction no pain  Repeated motion   Flex  Ext nil pain  Nil pain  Table OP   SB R  SB L  LE Strength  Hip   L Flex 4- Ext 5 Abd 3+ Add 4  R Flex 4- Ext 5 Abd 3+ Add 4  LE Screen strong and painless  Hip ROM  L ER 45 IR 45 flex 120  R  ER 45 IR 45 Flex 120  Joint Mobility: L1-3 G3, L3-S1 G4  No pain  Palpation: L GT (bursa)   STs   L + Radha, + [place] string  (-) SLR, (-) Prince/Fader  (-) Distraction  (-) [place] Range  R + Radha + bow string    TA draw 11"         Flowsheet Rows      Most Recent Value   PT/OT G-Codes   Current Score  68   Projected Score  74             Precautions: Osteopenia, HTN    Daily Treatment Diary       Manual 1/16            Jacknobber to glute med/GT region gentle                                                                 Exercise Diary             Treadmill UE support             TA draw/Postural review 5 mins            Bridges 2 x 10            ITB ST with Strap 20" x 4            SLR Abd 2 x 10            LS Ext 10            SLR Flex             Tband Side step  YTB           Tband 3 way hip   YTB           Tband Row + Ext   RTB           Mini Squat                                                                                                                                               Modalities             CP to L Hip Prn

## 2020-01-21 ENCOUNTER — OFFICE VISIT (OUTPATIENT)
Dept: PHYSICAL THERAPY | Facility: CLINIC | Age: 70
End: 2020-01-21
Payer: COMMERCIAL

## 2020-01-21 DIAGNOSIS — M70.62 GREATER TROCHANTERIC BURSITIS OF LEFT HIP: Primary | ICD-10-CM

## 2020-01-21 DIAGNOSIS — M25.552 LEFT HIP PAIN: ICD-10-CM

## 2020-01-21 PROCEDURE — 97110 THERAPEUTIC EXERCISES: CPT

## 2020-01-21 PROCEDURE — 97140 MANUAL THERAPY 1/> REGIONS: CPT

## 2020-01-21 NOTE — PROGRESS NOTES
Daily Note     Today's date: 2020  Patient name: Lindsey Olivo  : 1950  MRN: [id number]  Referring provider: Moncho Hardy DO  Dx:   Encounter Diagnosis     ICD-10-CM    1  Greater trochanteric bursitis of left hip M70 62    2  Left hip pain M25 552                   Subjective: patient states that she has been compliant with HEP   Has noticed some anterior hip pain to begin today's treatment session  Objective: See treatment diary below      Assessment: Tolerated treatment well  Patient exhibited good technique with therapeutic exercises      Plan: Continue per plan of care        Precautions: Osteopenia, HTN    Daily Treatment Diary       Manual              Genet Young to glute med/GT region gentle  8                                                               Exercise Diary             Treadmill UE support  5 min            TA draw/Postural review 5 mins            Bridges 2 x 10 2 x 10            ITB ST with Strap 20" x 4 :20x 4            SLR Abd 2 x 10 2 x 10            LS Ext 10            SLR Flex  2 x 10            Tband Side step  YTB 5 laps           Tband 3 way hip   YTB 2 x 10           Tband Row + Ext   RTB NV           Mini Squat                                                                                                                                               Modalities             CP to L Hip Prn  declined

## 2020-01-23 ENCOUNTER — OFFICE VISIT (OUTPATIENT)
Dept: PHYSICAL THERAPY | Facility: CLINIC | Age: 70
End: 2020-01-23
Payer: COMMERCIAL

## 2020-01-23 DIAGNOSIS — M70.62 GREATER TROCHANTERIC BURSITIS OF LEFT HIP: Primary | ICD-10-CM

## 2020-01-23 DIAGNOSIS — M25.552 LEFT HIP PAIN: ICD-10-CM

## 2020-01-23 PROCEDURE — 97110 THERAPEUTIC EXERCISES: CPT | Performed by: PHYSICAL THERAPIST

## 2020-01-23 PROCEDURE — 97112 NEUROMUSCULAR REEDUCATION: CPT | Performed by: PHYSICAL THERAPIST

## 2020-01-23 PROCEDURE — 97140 MANUAL THERAPY 1/> REGIONS: CPT | Performed by: PHYSICAL THERAPIST

## 2020-01-23 NOTE — PROGRESS NOTES
Daily Note     Today's date: 2020  Patient name: Valery Boyd  : 1950  MRN: [id number]  Referring provider: Kathy Chaney DO  Dx:   Encounter Diagnosis     ICD-10-CM    1  Greater trochanteric bursitis of left hip M70 62    2  Left hip pain M25 552                   Subjective: Pt reports that she has been feeling really well, she has been able to sleep on involved side and has not had any pain over the past few days, mild awareness  Objective: See treatment diary below      Assessment:  Asymptomatic t/o entirety  Continue to progress as able with reps and resistance  Plan: Continue per plan of care        Precautions: Osteopenia, HTN    Daily Treatment Diary       Manual           [place] Gavel to glute med/GT region gentle  8 10 min                                                               Exercise Diary             Treadmill UE support  5 min  6 min          TA draw/Postural review 5 mins            Bridges 2 x 10 2 x 10  2 x 10          ITB ST with Strap 20" x 4 :20x 4  20" x 4          SLR Abd 2 x 10 2 x 10  2 x 10          LS Ext 10  10x           SLR Flex  2 x 10  2 x 10          Tband Side step  YTB 5 laps RTB x4           Tband 3 way hip   YTB 2 x 10 RTB 2 x 10           Tband Row + Ext   RTB NV 2 x 10          Mini Squat   nv                                                                                                                                            Modalities             CP to L Hip Prn  declined dec

## 2020-01-30 ENCOUNTER — OFFICE VISIT (OUTPATIENT)
Dept: PHYSICAL THERAPY | Facility: CLINIC | Age: 70
End: 2020-01-30
Payer: COMMERCIAL

## 2020-01-30 DIAGNOSIS — M70.62 GREATER TROCHANTERIC BURSITIS OF LEFT HIP: Primary | ICD-10-CM

## 2020-01-30 DIAGNOSIS — M25.552 LEFT HIP PAIN: ICD-10-CM

## 2020-01-30 PROCEDURE — 97140 MANUAL THERAPY 1/> REGIONS: CPT | Performed by: PHYSICAL THERAPIST

## 2020-01-30 PROCEDURE — 97110 THERAPEUTIC EXERCISES: CPT | Performed by: PHYSICAL THERAPIST

## 2020-01-30 NOTE — PROGRESS NOTES
Daily Note     Today's date: 2020  Patient name: Дмитрий Osuna  : 1950  MRN: [id number]  Referring provider: Elliott Perez DO  Dx:   Encounter Diagnosis     ICD-10-CM    1  Greater trochanteric bursitis of left hip M70 62    2  Left hip pain M25 552                   Subjective:  Pt presents today stating that she still has mild pain with elevations but otherwise she has been without pain and content with progress  Objective: See treatment diary below      Assessment:  Asymptomatic t/o entirety  Proceeded with mini squats to further encourage trunk strength and mobility tolerance enhancement  Plan: Continue per plan of care        Precautions: Osteopenia, HTN    Daily Treatment Diary       Manual          Dorla Shires to glute med/GT region gentle  8 10 min  10 min                                                             Exercise Diary             Treadmill UE support  5 min  6 min 6 min         TA draw/Postural review 5 mins            Bridges 2 x 10 2 x 10  2 x 10 2 x 10         ITB ST with Strap 20" x 4 :20x 4  20" x 4 20" x 4         SLR Abd 2 x 10 2 x 10  2 x 10 2 x 10         LS Ext 10  10x  10x         SLR Flex  2 x 10  2 x 10 2 x 10         Tband Side step  YTB 5 laps RTB x4  RTB x 4         Tband 3 way hip   YTB 2 x 10 RTB 2 x 10  RTB x 4         Tband Row + Ext   RTB NV 2 x 10 RTB 2 x 10         Mini Squat   nv 2x 10                                                                                                                                           Modalities             CP to L Hip Prn  declined dec  dec

## 2020-02-06 ENCOUNTER — OFFICE VISIT (OUTPATIENT)
Dept: PHYSICAL THERAPY | Facility: CLINIC | Age: 70
End: 2020-02-06
Payer: COMMERCIAL

## 2020-02-06 DIAGNOSIS — M25.552 LEFT HIP PAIN: ICD-10-CM

## 2020-02-06 DIAGNOSIS — M70.62 GREATER TROCHANTERIC BURSITIS OF LEFT HIP: Primary | ICD-10-CM

## 2020-02-06 PROCEDURE — 97140 MANUAL THERAPY 1/> REGIONS: CPT | Performed by: PHYSICAL THERAPIST

## 2020-02-06 PROCEDURE — 97110 THERAPEUTIC EXERCISES: CPT | Performed by: PHYSICAL THERAPIST

## 2020-02-06 NOTE — PROGRESS NOTES
Daily Note     Today's date: 2020  Patient name: Charbel Griffith  : 1950  MRN: [id number]  Referring provider: Melvi Shabazz DO  Dx:   Encounter Diagnosis     ICD-10-CM    1  Greater trochanteric bursitis of left hip M70 62    2  Left hip pain M25 552                   Subjective:  Pt presents today stating that she woke up to some L hip pain, but otherwise has had several days with no limitation  Objective: See treatment diary below      Assessment:    Mild challenge with today's session due soreness that may be residual from waking up this AM   RE n v      Plan: Continue per plan of care        Precautions: Osteopenia, HTN    Daily Treatment Diary       Manual         [place] Papa to glute med/GT region gentle  8 10 min  10 min 10 min                                                            Exercise Diary             Treadmill UE support  5 min  6 min 6 min 6 min        TA draw/Postural review 5 mins            Bridges 2 x 10 2 x 10  2 x 10 2 x 10 2 x 10        ITB ST with Strap 20" x 4 :20x 4  20" x 4 20" x 4 20" x 4        SLR Abd 2 x 10 2 x 10  2 x 10 2 x 10 2 x 10        LS Ext 10  10x  10x 10x        SLR Flex  2 x 10  2 x 10 2 x 10 2 x 10        Tband Side step  YTB 5 laps RTB x4  RTB x 4 RTB x 4        Tband 3 way hip   YTB 2 x 10 RTB 2 x 10  RTB x 4 GTB 2 x 10        Tband Row + Ext   RTB NV 2 x 10 RTB 2 x 10 RTB 2 x 10        Mini Squat   nv 2x 10 2 x 10                                                                                                                                          Modalities             CP to L Hip Prn  declined dec  dec Dec

## 2020-02-13 ENCOUNTER — EVALUATION (OUTPATIENT)
Dept: PHYSICAL THERAPY | Facility: CLINIC | Age: 70
End: 2020-02-13
Payer: COMMERCIAL

## 2020-02-13 DIAGNOSIS — E78.2 MIXED HYPERLIPIDEMIA: ICD-10-CM

## 2020-02-13 DIAGNOSIS — I10 ESSENTIAL HYPERTENSION: ICD-10-CM

## 2020-02-13 DIAGNOSIS — M70.62 GREATER TROCHANTERIC BURSITIS OF LEFT HIP: Primary | ICD-10-CM

## 2020-02-13 DIAGNOSIS — M25.552 LEFT HIP PAIN: ICD-10-CM

## 2020-02-13 PROCEDURE — 97140 MANUAL THERAPY 1/> REGIONS: CPT | Performed by: PHYSICAL THERAPIST

## 2020-02-13 RX ORDER — HYDROCHLOROTHIAZIDE 12.5 MG/1
CAPSULE, GELATIN COATED ORAL
Qty: 90 CAPSULE | Refills: 1 | OUTPATIENT
Start: 2020-02-13

## 2020-02-13 RX ORDER — PRAVASTATIN SODIUM 20 MG
TABLET ORAL
Qty: 90 TABLET | Refills: 1 | OUTPATIENT
Start: 2020-02-13

## 2020-02-13 RX ORDER — AMLODIPINE BESYLATE 5 MG/1
TABLET ORAL
Qty: 90 TABLET | Refills: 1 | OUTPATIENT
Start: 2020-02-13

## 2020-02-13 NOTE — PROGRESS NOTES
PT Evaluation     Today's date: 2020  Patient name: Sherie Gonsales  : 1950  MRN: [id number]  Referring provider: Franck Flores DO  Dx:   Encounter Diagnosis     ICD-10-CM    1  Greater trochanteric bursitis of left hip M70 62    2  Left hip pain M25 552                   Assessment  Assessment details: Pt at this time demonstrates significant improvement in range, strength, flexibility and over all tolerance to activity  Pt at this time demonstrates independence with her HEP and is likely safe to DC to HEP  Whil pain levels are less frequent but just as intense, this is promising that the physiological factors will carry over with transition to HEP so her biological and chemical changes of pain will begin to dissipate  If symptoms don't improve she is welcome to return as needed however another aspect of today's DC was a component involving her Insurance copay  Thank you very much for this kind and motivated referral       Impairments: abnormal or restricted ROM, activity intolerance, impaired balance, impaired physical strength, lacks appropriate home exercise program, pain with function and poor posture   Understanding of Dx/Px/POC: good   Prognosis: good    Goals  STG 4 Weeks:  Decrease pain at worst to 4/10  Improve Flexibility to (-) LAINEY -met  Improve strength to 4- hip, TA Draw 20" -met  Independent with HEP -met  LTG 8 Weeks:  Decrease pain at worst to 2/10 -  Improve strength to 4/5 hip   TA draw 30" -met  Able to perform all desired activities with minimal to nil symptom exacerbation -partial       Plan  Patient would benefit from: skilled physical therapy  Planned modality interventions: cryotherapy, TENS and thermotherapy: hydrocollator packs  Planned therapy interventions: joint mobilization, manual therapy, abdominal trunk stabilization, neuromuscular re-education, patient education, postural training, strengthening, stretching, therapeutic activities, therapeutic exercise, home exercise program, graded motor, graded exercise, graded activity, gait training, functional ROM exercises and flexibility  Duration in weeks: 6  Treatment plan discussed with: patient        Subjective Evaluation    History of Present Illness  Date of onset: 2020  Mechanism of injury: Pt reports as a whole she has found improvement as a week picture, but she states that the pain at worst is still about 6/10 at worst   Less frequent, but the few times that she has had this occur, it has only been assisted by medication  Pt reports that she would like to make today her last day and transition to HEP and recreation exercise as she has been compliant and understands what she is experiencing currently  Pt reports that she does not wish further referral, or injection series as she dislikes pushy physicians  Quality of life: good    Pain  Current pain ratin  At best pain ratin  At worst pain ratin  Quality: dull ache  Relieving factors: medications and change in position  Aggravating factors: walking and stair climbing  Progression: improved      Diagnostic Tests  X-ray: normal  Treatments  Previous treatment: medication  Patient Goals  Patient goals for therapy: decreased pain, increased motion, increased strength and return to sport/leisure activities          Objective     Active Range of Motion     Lumbar   Flexion: 70 degrees   Extension: 20 degrees   Left lateral flexion: 15 degrees       Right lateral flexion: 15 degrees   Left rotation: 100 degrees   Right rotation: 100 degrees     Additional Active Range of Motion Details  Forward head, rounded shoulders, mild decrease Lordosis  B/L Sensation intact to L3,4,5,S1,S2  Postural correction no pain  Repeated motion   Flex  Ext nil pain  Nil pain  Table OP   SB R  SB L  LE Strength  Hip   L Flex 4 Ext 5 Abd 4 Add 5  R Flex 4 Ext 5 Abd 4 Add 5   LE Screen strong and painless     Hip ROM  L ER 45 IR 55 flex 120  R  ER 45 IR 55 Flex 120  Joint Mobility: L1-3 G3, L3-S1 G4  No pain  Palpation: L GT (bursa) (remainsS)   STs   L - Radha, - [place] string  (-) SLR, (-) Prince/Fader  (-) Distraction  (-) Luisito Lara  R - Radha - bow string    TA draw 25"                Precautions: Osteopenia, HTN    Daily Treatment Diary       Manual 1/16 1/21 1/23 1/30 2/6 2/13       Jacknobber to glute med/GT region gentle  8 10 min  10 min 10 min              25 min Assessment                                              Exercise Diary             Treadmill UE support  5 min  6 min 6 min 6 min        TA draw/Postural review 5 mins            Bridges 2 x 10 2 x 10  2 x 10 2 x 10 2 x 10        ITB ST with Strap 20" x 4 :20x 4  20" x 4 20" x 4 20" x 4        SLR Abd 2 x 10 2 x 10  2 x 10 2 x 10 2 x 10        LS Ext 10  10x  10x 10x        SLR Flex  2 x 10  2 x 10 2 x 10 2 x 10        Tband Side step  YTB 5 laps RTB x4  RTB x 4 RTB x 4        Tband 3 way hip   YTB 2 x 10 RTB 2 x 10  RTB x 4 GTB 2 x 10        Tband Row + Ext   RTB NV 2 x 10 RTB 2 x 10 RTB 2 x 10        Mini Squat   nv 2x 10 2 x 10                                                                                                                                          Modalities             CP to L Hip Prn  declined dec  dec Dec

## 2020-02-18 DIAGNOSIS — E78.2 MIXED HYPERLIPIDEMIA: ICD-10-CM

## 2020-02-18 DIAGNOSIS — I10 ESSENTIAL HYPERTENSION: ICD-10-CM

## 2020-02-18 RX ORDER — PRAVASTATIN SODIUM 20 MG
20 TABLET ORAL DAILY
Qty: 90 TABLET | Refills: 1 | Status: SHIPPED | OUTPATIENT
Start: 2020-02-18 | End: 2020-08-07

## 2020-02-18 RX ORDER — AMLODIPINE BESYLATE 5 MG/1
5 TABLET ORAL DAILY
Qty: 90 TABLET | Refills: 1 | Status: SHIPPED | OUTPATIENT
Start: 2020-02-18 | End: 2020-08-07

## 2020-02-18 RX ORDER — HYDROCHLOROTHIAZIDE 12.5 MG/1
12.5 CAPSULE, GELATIN COATED ORAL EVERY MORNING
Qty: 90 CAPSULE | Refills: 1 | Status: SHIPPED | OUTPATIENT
Start: 2020-02-18 | End: 2020-08-07

## 2020-02-18 NOTE — TELEPHONE ENCOUNTER
Medication refill request: Norvasc, HCTZ, Pravachol  Last office visit: 12/17/19  Next office visit: none  Last refilled: 09/03/2019 #[id number]  Pharmacy:   [address] [address]  Phone: [phone] Fax: [phone]    New pharmacy due to insurance  Pended #[id number]

## 2020-02-19 DIAGNOSIS — R32 URINARY INCONTINENCE, UNSPECIFIED TYPE: ICD-10-CM

## 2020-02-19 DIAGNOSIS — I10 ESSENTIAL HYPERTENSION: ICD-10-CM

## 2020-02-19 DIAGNOSIS — E78.2 MIXED HYPERLIPIDEMIA: ICD-10-CM

## 2020-02-19 RX ORDER — HYDROCHLOROTHIAZIDE 12.5 MG/1
12.5 CAPSULE, GELATIN COATED ORAL EVERY MORNING
Qty: 90 CAPSULE | Refills: 1 | Status: CANCELLED | OUTPATIENT
Start: 2020-02-19 | End: 2020-05-19

## 2020-02-19 RX ORDER — AMLODIPINE BESYLATE 5 MG/1
5 TABLET ORAL DAILY
Qty: 90 TABLET | Refills: 1 | Status: CANCELLED | OUTPATIENT
Start: 2020-02-19 | End: 2020-05-19

## 2020-02-19 RX ORDER — PRAVASTATIN SODIUM 20 MG
20 TABLET ORAL DAILY
Qty: 90 TABLET | Refills: 1 | Status: CANCELLED | OUTPATIENT
Start: 2020-02-19 | End: 2020-05-19

## 2020-02-19 NOTE — TELEPHONE ENCOUNTER
Medications: Mirabegron,   Last office visit:12/17/19  Next office visit:n/a  Labs:9/12/19  Last refilled:10/16/19#90x3  Pharmacy: on file     [facility]eds [facility]

## 2020-03-02 NOTE — PROGRESS NOTES
PT Discharge    Today's date: 3/2/2020  Patient name: Gal Pastrana  : 1950  MRN: [id number]  Referring provider: Bryanna Kenyon DO  Dx:   Encounter Diagnosis     ICD-10-CM    1  Greater trochanteric bursitis of left hip M70 62 PT plan of care cert/re-cert   2  Left hip pain M25 552 PT plan of care cert/re-cert       Start Time: 1005  Stop Time: 1030  Total time in clinic (min): 25 minutes    Assessment/Plan  Pt has not been present since 2020  Pt's chart will be DC in compliance of facility policy as all Charts are DC within 30 days of last scheduled visit          Subjective    Objective    Flowsheet Rows      Most Recent Value   PT/OT G-Codes   Current Score  75   Projected Score  74

## 2020-04-30 ENCOUNTER — HOSPITAL ENCOUNTER (OUTPATIENT)
Dept: RADIOLOGY | Facility: HOSPITAL | Age: 70
Discharge: HOME | End: 2020-04-30
Attending: PODIATRIST
Payer: COMMERCIAL

## 2020-04-30 ENCOUNTER — DOCUMENTATION (OUTPATIENT)
Dept: OTHER | Facility: HOSPITAL | Age: 70
End: 2020-04-30

## 2020-04-30 DIAGNOSIS — M77.31 CALCANEAL SPUR OF FOOT, RIGHT: Primary | ICD-10-CM

## 2020-04-30 DIAGNOSIS — M20.5X1 HALLUX LIMITUS, ACQUIRED, RIGHT: ICD-10-CM

## 2020-04-30 DIAGNOSIS — M77.31 CALCANEAL SPUR OF FOOT, RIGHT: ICD-10-CM

## 2020-04-30 PROCEDURE — 73630 X-RAY EXAM OF FOOT: CPT

## 2020-07-14 ENCOUNTER — PATIENT MESSAGE (OUTPATIENT)
Dept: FAMILY MEDICINE CLINIC | Facility: CLINIC | Age: 70
End: 2020-07-14

## 2020-07-14 DIAGNOSIS — L21.9 SEBORRHEA: Primary | ICD-10-CM

## 2020-07-14 RX ORDER — CLOBETASOL PROPIONATE 0.5 MG/G
OINTMENT TOPICAL 2 TIMES DAILY
Qty: 30 G | Refills: 3 | Status: SHIPPED | OUTPATIENT
Start: 2020-07-14

## 2020-07-14 NOTE — TELEPHONE ENCOUNTER
From: Severiano Sherry  To: Gina Florentino DO  Sent: 7/14/2020 3:36 PM EDT  Subject: Prescription Question    Can I please have a prescription of clobetasol 0 05% that I use topically to affected areas twice a week at bedtime  Please send to [facility] on [phone] in [place]  Thank you    Severiano Sherry

## 2020-07-14 NOTE — TELEPHONE ENCOUNTER
Medication: Clobetasol   Last refilled: 8/13/19  Last Office Visit: 12/17/19  Next Office Visit: N/A   Pharmacy:

## 2020-08-06 DIAGNOSIS — I10 ESSENTIAL HYPERTENSION: ICD-10-CM

## 2020-08-06 DIAGNOSIS — E78.2 MIXED HYPERLIPIDEMIA: ICD-10-CM

## 2020-08-07 RX ORDER — AMLODIPINE BESYLATE 5 MG/1
TABLET ORAL
Qty: 90 TABLET | Refills: 3 | Status: SHIPPED | OUTPATIENT
Start: 2020-08-07 | End: 2021-08-05 | Stop reason: SDUPTHER

## 2020-08-07 RX ORDER — PRAVASTATIN SODIUM 20 MG
TABLET ORAL
Qty: 90 TABLET | Refills: 3 | Status: SHIPPED | OUTPATIENT
Start: 2020-08-07 | End: 2021-08-05 | Stop reason: SDUPTHER

## 2020-08-07 RX ORDER — HYDROCHLOROTHIAZIDE 12.5 MG/1
CAPSULE, GELATIN COATED ORAL
Qty: 90 CAPSULE | Refills: 3 | Status: SHIPPED | OUTPATIENT
Start: 2020-08-07 | End: 2021-08-05 | Stop reason: SDUPTHER

## 2020-10-01 DIAGNOSIS — R32 URINARY INCONTINENCE, UNSPECIFIED TYPE: ICD-10-CM

## 2020-10-01 RX ORDER — MIRABEGRON 50 MG/1
50 TABLET, FILM COATED, EXTENDED RELEASE ORAL DAILY
Qty: 90 TABLET | Refills: 1 | Status: SHIPPED | OUTPATIENT
Start: 2020-10-01 | End: 2021-04-06 | Stop reason: SDUPTHER

## 2020-11-04 ENCOUNTER — OFFICE VISIT (OUTPATIENT)
Dept: FAMILY MEDICINE CLINIC | Facility: CLINIC | Age: 70
End: 2020-11-04
Payer: COMMERCIAL

## 2020-11-04 VITALS
SYSTOLIC BLOOD PRESSURE: 140 MMHG | DIASTOLIC BLOOD PRESSURE: 70 MMHG | RESPIRATION RATE: 20 BRPM | HEART RATE: 76 BPM | BODY MASS INDEX: 29.63 KG/M2 | OXYGEN SATURATION: 97 % | TEMPERATURE: 98.5 F | WEIGHT: 161 LBS | HEIGHT: 62 IN

## 2020-11-04 DIAGNOSIS — E78.2 MIXED HYPERLIPIDEMIA: ICD-10-CM

## 2020-11-04 DIAGNOSIS — Z12.11 SCREEN FOR COLON CANCER: ICD-10-CM

## 2020-11-04 DIAGNOSIS — Z00.00 MEDICARE ANNUAL WELLNESS VISIT, SUBSEQUENT: ICD-10-CM

## 2020-11-04 DIAGNOSIS — Z12.31 SCREENING MAMMOGRAM, ENCOUNTER FOR: ICD-10-CM

## 2020-11-04 DIAGNOSIS — I10 ESSENTIAL HYPERTENSION: Primary | ICD-10-CM

## 2020-11-04 DIAGNOSIS — E55.9 VITAMIN D DEFICIENCY: ICD-10-CM

## 2020-11-04 PROCEDURE — 1125F AMNT PAIN NOTED PAIN PRSNT: CPT | Performed by: FAMILY MEDICINE

## 2020-11-04 PROCEDURE — 1160F RVW MEDS BY RX/DR IN RCRD: CPT | Performed by: FAMILY MEDICINE

## 2020-11-04 PROCEDURE — 99214 OFFICE O/P EST MOD 30 MIN: CPT | Performed by: FAMILY MEDICINE

## 2020-11-04 PROCEDURE — 1036F TOBACCO NON-USER: CPT | Performed by: FAMILY MEDICINE

## 2020-11-04 PROCEDURE — 1170F FXNL STATUS ASSESSED: CPT | Performed by: FAMILY MEDICINE

## 2020-11-04 PROCEDURE — 3008F BODY MASS INDEX DOCD: CPT | Performed by: FAMILY MEDICINE

## 2020-11-04 PROCEDURE — 3078F DIAST BP <80 MM HG: CPT | Performed by: FAMILY MEDICINE

## 2020-11-04 PROCEDURE — 3077F SYST BP >= 140 MM HG: CPT | Performed by: FAMILY MEDICINE

## 2020-11-04 PROCEDURE — G0439 PPPS, SUBSEQ VISIT: HCPCS | Performed by: FAMILY MEDICINE

## 2020-11-04 PROCEDURE — 3725F SCREEN DEPRESSION PERFORMED: CPT | Performed by: FAMILY MEDICINE

## 2020-11-19 ENCOUNTER — LAB (OUTPATIENT)
Dept: LAB | Facility: CLINIC | Age: 70
End: 2020-11-19
Payer: COMMERCIAL

## 2020-11-19 DIAGNOSIS — E55.9 VITAMIN D DEFICIENCY: ICD-10-CM

## 2020-11-19 DIAGNOSIS — I10 ESSENTIAL HYPERTENSION: ICD-10-CM

## 2020-11-19 DIAGNOSIS — E78.2 MIXED HYPERLIPIDEMIA: ICD-10-CM

## 2020-11-19 LAB
25(OH)D3 SERPL-MCNC: 33.2 NG/ML (ref 30–100)
ALBUMIN SERPL BCP-MCNC: 4 G/DL (ref 3.5–5)
ALP SERPL-CCNC: 74 U/L (ref 46–116)
ALT SERPL W P-5'-P-CCNC: 23 U/L (ref 12–78)
ANION GAP SERPL CALCULATED.3IONS-SCNC: 9 MMOL/L (ref 4–13)
AST SERPL W P-5'-P-CCNC: 17 U/L (ref 5–45)
BASOPHILS # BLD AUTO: 0.02 THOUSANDS/ΜL (ref 0–0.1)
BASOPHILS NFR BLD AUTO: 0 % (ref 0–1)
BILIRUB SERPL-MCNC: 0.44 MG/DL (ref 0.2–1)
BUN SERPL-MCNC: 12 MG/DL (ref 5–25)
CALCIUM SERPL-MCNC: 9.5 MG/DL (ref 8.3–10.1)
CHLORIDE SERPL-SCNC: 106 MMOL/L (ref 100–108)
CHOLEST SERPL-MCNC: 171 MG/DL (ref 50–200)
CO2 SERPL-SCNC: 28 MMOL/L (ref 21–32)
CREAT SERPL-MCNC: 0.77 MG/DL (ref 0.6–1.3)
EOSINOPHIL # BLD AUTO: 0.19 THOUSAND/ΜL (ref 0–0.61)
EOSINOPHIL NFR BLD AUTO: 4 % (ref 0–6)
ERYTHROCYTE [DISTWIDTH] IN BLOOD BY AUTOMATED COUNT: 12.2 % (ref 11.6–15.1)
GFR SERPL CREATININE-BSD FRML MDRD: 79 ML/MIN/1.73SQ M
GLUCOSE P FAST SERPL-MCNC: 99 MG/DL (ref 65–99)
HCT VFR BLD AUTO: 41.5 % (ref 34.8–46.1)
HDLC SERPL-MCNC: 85 MG/DL
HGB BLD-MCNC: 13.8 G/DL (ref 11.5–15.4)
IMM GRANULOCYTES # BLD AUTO: 0.01 THOUSAND/UL (ref 0–0.2)
IMM GRANULOCYTES NFR BLD AUTO: 0 % (ref 0–2)
LDLC SERPL CALC-MCNC: 73 MG/DL (ref 0–100)
LYMPHOCYTES # BLD AUTO: 1.3 THOUSANDS/ΜL (ref 0.6–4.47)
LYMPHOCYTES NFR BLD AUTO: 24 % (ref 14–44)
MCH RBC QN AUTO: 30.6 PG (ref 26.8–34.3)
MCHC RBC AUTO-ENTMCNC: 33.3 G/DL (ref 31.4–37.4)
MCV RBC AUTO: 92 FL (ref 82–98)
MONOCYTES # BLD AUTO: 0.65 THOUSAND/ΜL (ref 0.17–1.22)
MONOCYTES NFR BLD AUTO: 12 % (ref 4–12)
NEUTROPHILS # BLD AUTO: 3.33 THOUSANDS/ΜL (ref 1.85–7.62)
NEUTS SEG NFR BLD AUTO: 60 % (ref 43–75)
NONHDLC SERPL-MCNC: 86 MG/DL
NRBC BLD AUTO-RTO: 0 /100 WBCS
PLATELET # BLD AUTO: 268 THOUSANDS/UL (ref 149–390)
PMV BLD AUTO: 10.1 FL (ref 8.9–12.7)
POTASSIUM SERPL-SCNC: 3.9 MMOL/L (ref 3.5–5.3)
PROT SERPL-MCNC: 7.3 G/DL (ref 6.4–8.2)
RBC # BLD AUTO: 4.51 MILLION/UL (ref 3.81–5.12)
SODIUM SERPL-SCNC: 143 MMOL/L (ref 136–145)
TRIGL SERPL-MCNC: 67 MG/DL
TSH SERPL DL<=0.05 MIU/L-ACNC: 1.97 UIU/ML (ref 0.36–3.74)
WBC # BLD AUTO: 5.5 THOUSAND/UL (ref 4.31–10.16)

## 2020-11-19 PROCEDURE — 82306 VITAMIN D 25 HYDROXY: CPT

## 2020-11-19 PROCEDURE — 80053 COMPREHEN METABOLIC PANEL: CPT

## 2020-11-19 PROCEDURE — 36415 COLL VENOUS BLD VENIPUNCTURE: CPT

## 2020-11-19 PROCEDURE — 80061 LIPID PANEL: CPT

## 2020-11-19 PROCEDURE — 85025 COMPLETE CBC W/AUTO DIFF WBC: CPT

## 2020-11-19 PROCEDURE — 84443 ASSAY THYROID STIM HORMONE: CPT

## 2021-02-14 ENCOUNTER — IMMUNIZATIONS (OUTPATIENT)
Dept: FAMILY MEDICINE CLINIC | Facility: HOSPITAL | Age: 71
End: 2021-02-14

## 2021-02-14 DIAGNOSIS — Z23 ENCOUNTER FOR IMMUNIZATION: Primary | ICD-10-CM

## 2021-02-14 PROCEDURE — 91300 SARS-COV-2 / COVID-19 MRNA VACCINE (PFIZER-BIONTECH) 30 MCG: CPT

## 2021-02-14 PROCEDURE — 0001A SARS-COV-2 / COVID-19 MRNA VACCINE (PFIZER-BIONTECH) 30 MCG: CPT

## 2021-03-07 ENCOUNTER — IMMUNIZATIONS (OUTPATIENT)
Dept: FAMILY MEDICINE CLINIC | Facility: HOSPITAL | Age: 71
End: 2021-03-07

## 2021-03-07 DIAGNOSIS — Z23 ENCOUNTER FOR IMMUNIZATION: Primary | ICD-10-CM

## 2021-03-07 PROCEDURE — 0002A SARS-COV-2 / COVID-19 MRNA VACCINE (PFIZER-BIONTECH) 30 MCG: CPT

## 2021-03-07 PROCEDURE — 91300 SARS-COV-2 / COVID-19 MRNA VACCINE (PFIZER-BIONTECH) 30 MCG: CPT

## 2021-04-06 DIAGNOSIS — R32 URINARY INCONTINENCE, UNSPECIFIED TYPE: ICD-10-CM

## 2021-04-06 RX ORDER — MIRABEGRON 50 MG/1
50 TABLET, FILM COATED, EXTENDED RELEASE ORAL DAILY
Qty: 90 TABLET | Refills: 1 | Status: SHIPPED | OUTPATIENT
Start: 2021-04-06 | End: 2021-10-25 | Stop reason: SDUPTHER

## 2021-04-06 NOTE — TELEPHONE ENCOUNTER
Medication refill requested: Myrbetriq  Last office visit: 11/4/20  Next office visit: none  Last refilled: 10/1/20 #90x1  Pharmacy: [facility] , [address] [address]  Phone: [phone] Fax: [phone]      Pended: 90x1

## 2021-04-23 ENCOUNTER — VBI (OUTPATIENT)
Dept: ADMINISTRATIVE | Facility: OTHER | Age: 71
End: 2021-04-23

## 2021-05-14 ENCOUNTER — RA CDI HCC (OUTPATIENT)
Dept: OTHER | Facility: HOSPITAL | Age: 71
End: 2021-05-14

## 2021-05-14 NOTE — PROGRESS NOTES
[facility] 75  coding opportunities          Chart reviewed, no opportunity found: CHART REVIEWED, NO OPPORTUNITY FOUND              Patients insurance company: [employer] (Medicare Advantage and Commercial)

## 2021-05-25 ENCOUNTER — HOSPITAL ENCOUNTER (OUTPATIENT)
Dept: MAMMOGRAPHY | Facility: HOSPITAL | Age: 71
Discharge: HOME | End: 2021-05-25
Attending: FAMILY MEDICINE
Payer: COMMERCIAL

## 2021-05-25 VITALS — BODY MASS INDEX: 28.53 KG/M2 | HEIGHT: 63 IN | WEIGHT: 161 LBS

## 2021-05-25 DIAGNOSIS — Z12.31 SCREENING MAMMOGRAM, ENCOUNTER FOR: ICD-10-CM

## 2021-05-25 PROCEDURE — 77067 SCR MAMMO BI INCL CAD: CPT

## 2021-05-25 PROCEDURE — 77063 BREAST TOMOSYNTHESIS BI: CPT

## 2021-06-23 ENCOUNTER — OFFICE VISIT (OUTPATIENT)
Dept: OBGYN CLINIC | Facility: CLINIC | Age: 71
End: 2021-06-23
Payer: COMMERCIAL

## 2021-06-23 VITALS
HEART RATE: 67 BPM | WEIGHT: 160 LBS | SYSTOLIC BLOOD PRESSURE: 180 MMHG | DIASTOLIC BLOOD PRESSURE: 75 MMHG | BODY MASS INDEX: 28.35 KG/M2 | HEIGHT: 63 IN

## 2021-06-23 DIAGNOSIS — M17.11 PRIMARY OSTEOARTHRITIS OF RIGHT KNEE: Primary | ICD-10-CM

## 2021-06-23 PROCEDURE — 1160F RVW MEDS BY RX/DR IN RCRD: CPT | Performed by: ORTHOPAEDIC SURGERY

## 2021-06-23 PROCEDURE — 1036F TOBACCO NON-USER: CPT | Performed by: ORTHOPAEDIC SURGERY

## 2021-06-23 PROCEDURE — 3008F BODY MASS INDEX DOCD: CPT | Performed by: ORTHOPAEDIC SURGERY

## 2021-06-23 PROCEDURE — 99203 OFFICE O/P NEW LOW 30 MIN: CPT | Performed by: ORTHOPAEDIC SURGERY

## 2021-06-23 NOTE — PROGRESS NOTES
Patient Name:  Selena Will  MRN:  [id number]    Assessment & Plan     Right knee DJD  1  Patient notes overall improvement while taking diclofenac  Recommend she may continue this for up to an additional two months  Refill provided at patient's request     2  Activities as tolerated with modification to avoid pain  3  Follow-up as needed  Briefly discussed intra-articular corticosteroid injection if pain persists  Chief Complaint     Right knee pain    History of the Present Illness     Selena Will is a 80 y o  female who reports to the office today for initial evaluation of her right knee  She notes a five week history of right knee pain  She denies any injury or trauma  Pain is localized primarily to the anteromedial aspect of the knee  She denies any significant swelling stiffness weakness or instability  Pain is worse with walking up and down steps  [facility] and she was prescribed oral Voltaren which did provide significant relief  She did note a return of her pain after weaning off of the Voltaren  No numbness or tingling  No fevers or chills  Physical Exam     BP (!) 180/75   Pulse 67   Ht 5' 2 5" (1 588 m)   Wt 72 6 kg (160 lb)   BMI 28 80 kg/m²     Right knee:  Skin intact  No erythema ecchymosis or swelling  No effusion  Tenderness to palpation anteromedial joint line  No tenderness to palpation lateral joint line  Range of motion 0-110 without pain  Stable to varus and valgus stress  Stable Lachman test   Negative Tyrone's test     Eyes:  Anicteric sclerae  ENT:  Trachea midline  Lungs:  Normal respiratory effort  Cardiovascular:  Capillary refill is less than 2 seconds  Lymphatic:  No palpable lymphadenopathy  Skin:  Intact without erythema  Neurologic:  Sensation grossly intact to light touch  Psychiatric:  Mood and affect are appropriate      Data Review     I have personally reviewed pertinent films in PACS, and my interpretation follows:    X-rays right knee 21:  Mild to moderate tricompartmental degenerative changes  Past Medical History:   Diagnosis Date    Hyperlipidemia     Hypertension     Osteopenia     Urinary incontinence        Past Surgical History:   Procedure Laterality Date    INCONTINENCE SURGERY      TUBAL LIGATION         Allergies   Allergen Reactions    Ibandronic Acid        Current Outpatient Medications on File Prior to Visit   Medication Sig Dispense Refill    Diclofenac Sodium (VOLTAREN EX) Apply topically      amLODIPine (NORVASC) 5 mg tablet TAKE 1 TABLET DAILY 90 tablet 3    aspirin 81 MG tablet Take 81 mg by mouth Daily      clobetasol (TEMOVATE) 0 05 % ointment Apply topically 2 (two) times a day 30 g 3    hydrochlorothiazide (MICROZIDE) 12 5 mg capsule TAKE 1 CAPSULE EVERY MORNING 90 capsule 3    Mirabegron ER (Myrbetriq) 50 MG TB24 Take 1 tablet (50 mg total) by mouth daily 90 tablet 1    pravastatin (PRAVACHOL) 20 mg tablet TAKE 1 TABLET DAILY 90 tablet 3     No current facility-administered medications on file prior to visit  Social History     Tobacco Use    Smoking status: Former Smoker     Packs/day:      Years: 10 00     Pack years: 10 00     Types: Cigarettes     Quit date: 1985     Years since quittin 6    Smokeless tobacco: Never Used   Vaping Use    Vaping Use: Never used   Substance Use Topics    Alcohol use: Yes    Drug use: Never       Family History   Problem Relation Age of Onset    Hypertension Mother     No Known Problems Father     Diabetes Maternal Grandmother     No Known Problems Maternal Grandfather     No Known Problems Sister     No Known Problems Daughter     No Known Problems Son     No Known Problems Daughter        Review of Systems     As stated in the HPI  All other systems were reviewed and are negative        Scribe Attestation    I,:  Ericka Porter PA-C am acting as a scribe while in the presence of the attending physician :       I,:  Tommi Boas, MD personally performed the services described in this documentation    as scribed in my presence :

## 2021-06-29 ENCOUNTER — HOSPITAL ENCOUNTER (OUTPATIENT)
Dept: MAMMOGRAPHY | Facility: CLINIC | Age: 71
Discharge: HOME | End: 2021-06-29
Payer: COMMERCIAL

## 2021-06-29 ENCOUNTER — HOSPITAL ENCOUNTER (OUTPATIENT)
Dept: ULTRASOUND IMAGING | Facility: CLINIC | Age: 71
Discharge: HOME | End: 2021-06-29
Payer: COMMERCIAL

## 2021-06-29 VITALS — BODY MASS INDEX: 28.35 KG/M2 | WEIGHT: 160 LBS | HEIGHT: 63 IN

## 2021-06-29 DIAGNOSIS — R92.8 ABNORMAL MAMMOGRAM: ICD-10-CM

## 2021-06-29 PROCEDURE — 76642 ULTRASOUND BREAST LIMITED: CPT

## 2021-06-29 PROCEDURE — 77065 DX MAMMO INCL CAD UNI: CPT

## 2021-06-29 PROCEDURE — G0279 TOMOSYNTHESIS, MAMMO: HCPCS

## 2021-08-02 DIAGNOSIS — E78.2 MIXED HYPERLIPIDEMIA: ICD-10-CM

## 2021-08-02 DIAGNOSIS — I10 ESSENTIAL HYPERTENSION: ICD-10-CM

## 2021-08-03 RX ORDER — AMLODIPINE BESYLATE 5 MG/1
TABLET ORAL
Qty: 90 TABLET | Refills: 3 | OUTPATIENT
Start: 2021-08-03

## 2021-08-03 RX ORDER — PRAVASTATIN SODIUM 20 MG
TABLET ORAL
Qty: 90 TABLET | Refills: 3 | OUTPATIENT
Start: 2021-08-03

## 2021-08-03 RX ORDER — HYDROCHLOROTHIAZIDE 12.5 MG/1
CAPSULE, GELATIN COATED ORAL
Qty: 90 CAPSULE | Refills: 3 | OUTPATIENT
Start: 2021-08-03

## 2021-08-05 DIAGNOSIS — E78.2 MIXED HYPERLIPIDEMIA: ICD-10-CM

## 2021-08-05 DIAGNOSIS — I10 ESSENTIAL HYPERTENSION: ICD-10-CM

## 2021-08-05 NOTE — TELEPHONE ENCOUNTER
Medication refill requested: Amlodipine, Hydrochlorothiazide, Pravastatin  Last office visit: 11/4/20  Next office visit: None   Last refilled: 8/7/20  Pharmacy :   [address] [address]  Phone: [phone] Fax: [phone]       Pended: See pended

## 2021-08-06 RX ORDER — HYDROCHLOROTHIAZIDE 12.5 MG/1
12.5 CAPSULE, GELATIN COATED ORAL EVERY MORNING
Qty: 90 CAPSULE | Refills: 3 | Status: SHIPPED | OUTPATIENT
Start: 2021-08-06 | End: 2022-08-09 | Stop reason: SDUPTHER

## 2021-08-06 RX ORDER — AMLODIPINE BESYLATE 5 MG/1
5 TABLET ORAL DAILY
Qty: 90 TABLET | Refills: 3 | Status: SHIPPED | OUTPATIENT
Start: 2021-08-06 | End: 2022-08-09 | Stop reason: SDUPTHER

## 2021-08-06 RX ORDER — PRAVASTATIN SODIUM 20 MG
20 TABLET ORAL DAILY
Qty: 90 TABLET | Refills: 3 | Status: SHIPPED | OUTPATIENT
Start: 2021-08-06 | End: 2022-08-09 | Stop reason: SDUPTHER

## 2021-09-22 ENCOUNTER — OFFICE VISIT (OUTPATIENT)
Dept: OBGYN CLINIC | Facility: CLINIC | Age: 71
End: 2021-09-22
Payer: COMMERCIAL

## 2021-09-22 VITALS
DIASTOLIC BLOOD PRESSURE: 80 MMHG | HEIGHT: 63 IN | WEIGHT: 160 LBS | BODY MASS INDEX: 28.35 KG/M2 | HEART RATE: 84 BPM | SYSTOLIC BLOOD PRESSURE: 159 MMHG

## 2021-09-22 DIAGNOSIS — M17.11 PRIMARY OSTEOARTHRITIS OF RIGHT KNEE: Primary | ICD-10-CM

## 2021-09-22 PROCEDURE — 99213 OFFICE O/P EST LOW 20 MIN: CPT | Performed by: ORTHOPAEDIC SURGERY

## 2021-09-22 PROCEDURE — 20610 DRAIN/INJ JOINT/BURSA W/O US: CPT | Performed by: ORTHOPAEDIC SURGERY

## 2021-09-22 PROCEDURE — 1160F RVW MEDS BY RX/DR IN RCRD: CPT | Performed by: ORTHOPAEDIC SURGERY

## 2021-09-22 PROCEDURE — 1036F TOBACCO NON-USER: CPT | Performed by: ORTHOPAEDIC SURGERY

## 2021-09-22 PROCEDURE — 3008F BODY MASS INDEX DOCD: CPT | Performed by: ORTHOPAEDIC SURGERY

## 2021-09-22 RX ORDER — LIDOCAINE HYDROCHLORIDE 10 MG/ML
4 INJECTION, SOLUTION INFILTRATION; PERINEURAL
Status: COMPLETED | OUTPATIENT
Start: 2021-09-22 | End: 2021-09-22

## 2021-09-22 RX ORDER — METHYLPREDNISOLONE ACETATE 40 MG/ML
1 INJECTION, SUSPENSION INTRA-ARTICULAR; INTRALESIONAL; INTRAMUSCULAR; SOFT TISSUE
Status: COMPLETED | OUTPATIENT
Start: 2021-09-22 | End: 2021-09-22

## 2021-09-22 RX ADMIN — METHYLPREDNISOLONE ACETATE 1 ML: 40 INJECTION, SUSPENSION INTRA-ARTICULAR; INTRALESIONAL; INTRAMUSCULAR; SOFT TISSUE at 11:22

## 2021-09-22 RX ADMIN — LIDOCAINE HYDROCHLORIDE 4 ML: 10 INJECTION, SOLUTION INFILTRATION; PERINEURAL at 11:22

## 2021-09-22 NOTE — PROGRESS NOTES
Patient Name:  Germain Santa  MRN:  [id number]    Assessment & Plan     Right Knee DJD  1  Cortisone injection performed today into her right knee  2  Continue diclofenac p r n  for pain relief   3  Consider MRI if symptoms persist in 2-4 weeks  Instructed to call office  4  Follow-up on an as-needed basis  History of the Present Illness     79 y/o female who reports today due to continued right knee pain due to osteoarthritis  Patient notes that her pain has been persistent and intermittent since her last visit  She localizes her pain about the anteromedial aspect of the knee  Pain is worse with ambulating up and down stairs and at night  She takes Diclofenac prn for pain relief with some benefit  General ROS:  Negative for fever or chills  Neurological ROS:  Negative for numbness or tingling      Physical Exam     /80   Pulse 84   Ht 5' 2 5" (1 588 m)   Wt 72 6 kg (160 lb)   BMI 28 80 kg/m²     Right knee:  Effusion: None  Tenderness: Medial joint line  Range of motion: 0-110  Lachman test: Stable  Varus stress: Stable  Valgus stress: Stable  Posterior drawer test: Stable  Tyrone's test: Negative  Patellar compression test: Negative    Large joint arthrocentesis: R knee  Supporting Documentation  Indications: pain and diagnostic evaluation   Procedure Details  Location: knee - R knee  Preparation: Patient was prepped and draped in the usual sterile fashion  Needle size: 22 G  Ultrasound guidance: no  Approach: lateral  Medications administered: 1 mL methylPREDNISolone acetate 40 mg/mL; 4 mL lidocaine 1 %    Patient tolerance: patient tolerated the procedure well with no immediate complications  Dressing:  Sterile dressing applied          Scribe Attestation    I,:  Sharron Lake am acting as a scribe while in the presence of the attending physician :       I,:  Joseph Verdugo MD personally performed the services described in this documentation    as scribed in my presence :

## 2021-10-06 ENCOUNTER — VBI (OUTPATIENT)
Dept: ADMINISTRATIVE | Facility: OTHER | Age: 71
End: 2021-10-06

## 2021-10-16 ENCOUNTER — IMMUNIZATIONS (OUTPATIENT)
Dept: FAMILY MEDICINE CLINIC | Facility: HOSPITAL | Age: 71
End: 2021-10-16

## 2021-10-16 DIAGNOSIS — Z23 ENCOUNTER FOR IMMUNIZATION: Primary | ICD-10-CM

## 2021-10-16 PROCEDURE — 91300 SARS-COV-2 / COVID-19 MRNA VACCINE (PFIZER-BIONTECH) 30 MCG: CPT

## 2021-10-16 PROCEDURE — 0001A SARS-COV-2 / COVID-19 MRNA VACCINE (PFIZER-BIONTECH) 30 MCG: CPT

## 2021-10-19 ENCOUNTER — TELEPHONE (OUTPATIENT)
Dept: DERMATOLOGY | Facility: CLINIC | Age: 71
End: 2021-10-19

## 2021-10-25 DIAGNOSIS — R32 URINARY INCONTINENCE, UNSPECIFIED TYPE: ICD-10-CM

## 2021-10-26 RX ORDER — MIRABEGRON 50 MG/1
50 TABLET, FILM COATED, EXTENDED RELEASE ORAL DAILY
Qty: 90 TABLET | Refills: 0 | Status: SHIPPED | OUTPATIENT
Start: 2021-10-26 | End: 2022-01-24 | Stop reason: SDUPTHER

## 2021-11-03 ENCOUNTER — RA CDI HCC (OUTPATIENT)
Dept: OTHER | Facility: HOSPITAL | Age: 71
End: 2021-11-03

## 2021-11-04 ENCOUNTER — APPOINTMENT (OUTPATIENT)
Dept: LAB | Facility: CLINIC | Age: 71
End: 2021-11-04
Payer: COMMERCIAL

## 2021-11-04 DIAGNOSIS — E78.2 MIXED HYPERLIPIDEMIA: ICD-10-CM

## 2021-11-04 DIAGNOSIS — E55.9 VITAMIN D DEFICIENCY: ICD-10-CM

## 2021-11-04 DIAGNOSIS — I10 ESSENTIAL HYPERTENSION: ICD-10-CM

## 2021-11-04 LAB
25(OH)D3 SERPL-MCNC: 39.1 NG/ML (ref 30–100)
ALBUMIN SERPL BCP-MCNC: 4 G/DL (ref 3.5–5)
ALP SERPL-CCNC: 79 U/L (ref 46–116)
ALT SERPL W P-5'-P-CCNC: 21 U/L (ref 12–78)
ANION GAP SERPL CALCULATED.3IONS-SCNC: 9 MMOL/L (ref 4–13)
AST SERPL W P-5'-P-CCNC: 19 U/L (ref 5–45)
BASOPHILS # BLD AUTO: 0.03 THOUSANDS/ΜL (ref 0–0.1)
BASOPHILS NFR BLD AUTO: 1 % (ref 0–1)
BILIRUB SERPL-MCNC: 0.64 MG/DL (ref 0.2–1)
BUN SERPL-MCNC: 9 MG/DL (ref 5–25)
CALCIUM SERPL-MCNC: 9.2 MG/DL (ref 8.3–10.1)
CHLORIDE SERPL-SCNC: 105 MMOL/L (ref 100–108)
CHOLEST SERPL-MCNC: 210 MG/DL (ref 50–200)
CO2 SERPL-SCNC: 28 MMOL/L (ref 21–32)
CREAT SERPL-MCNC: 0.92 MG/DL (ref 0.6–1.3)
EOSINOPHIL # BLD AUTO: 0.2 THOUSAND/ΜL (ref 0–0.61)
EOSINOPHIL NFR BLD AUTO: 5 % (ref 0–6)
ERYTHROCYTE [DISTWIDTH] IN BLOOD BY AUTOMATED COUNT: 11.9 % (ref 11.6–15.1)
GFR SERPL CREATININE-BSD FRML MDRD: 63 ML/MIN/1.73SQ M
GLUCOSE P FAST SERPL-MCNC: 98 MG/DL (ref 65–99)
HCT VFR BLD AUTO: 41.8 % (ref 34.8–46.1)
HDLC SERPL-MCNC: 78 MG/DL
HGB BLD-MCNC: 13.7 G/DL (ref 11.5–15.4)
IMM GRANULOCYTES # BLD AUTO: 0.01 THOUSAND/UL (ref 0–0.2)
IMM GRANULOCYTES NFR BLD AUTO: 0 % (ref 0–2)
LDLC SERPL CALC-MCNC: 114 MG/DL (ref 0–100)
LYMPHOCYTES # BLD AUTO: 1.35 THOUSANDS/ΜL (ref 0.6–4.47)
LYMPHOCYTES NFR BLD AUTO: 32 % (ref 14–44)
MCH RBC QN AUTO: 30 PG (ref 26.8–34.3)
MCHC RBC AUTO-ENTMCNC: 32.8 G/DL (ref 31.4–37.4)
MCV RBC AUTO: 92 FL (ref 82–98)
MONOCYTES # BLD AUTO: 0.64 THOUSAND/ΜL (ref 0.17–1.22)
MONOCYTES NFR BLD AUTO: 15 % (ref 4–12)
NEUTROPHILS # BLD AUTO: 1.96 THOUSANDS/ΜL (ref 1.85–7.62)
NEUTS SEG NFR BLD AUTO: 47 % (ref 43–75)
NONHDLC SERPL-MCNC: 132 MG/DL
NRBC BLD AUTO-RTO: 0 /100 WBCS
PLATELET # BLD AUTO: 269 THOUSANDS/UL (ref 149–390)
PMV BLD AUTO: 9.8 FL (ref 8.9–12.7)
POTASSIUM SERPL-SCNC: 3.8 MMOL/L (ref 3.5–5.3)
PROT SERPL-MCNC: 7.2 G/DL (ref 6.4–8.2)
RBC # BLD AUTO: 4.56 MILLION/UL (ref 3.81–5.12)
SODIUM SERPL-SCNC: 142 MMOL/L (ref 136–145)
TRIGL SERPL-MCNC: 92 MG/DL
WBC # BLD AUTO: 4.19 THOUSAND/UL (ref 4.31–10.16)

## 2021-11-04 PROCEDURE — 82306 VITAMIN D 25 HYDROXY: CPT

## 2021-11-04 PROCEDURE — 36415 COLL VENOUS BLD VENIPUNCTURE: CPT

## 2021-11-04 PROCEDURE — 85025 COMPLETE CBC W/AUTO DIFF WBC: CPT

## 2021-11-04 PROCEDURE — 80061 LIPID PANEL: CPT

## 2021-11-04 PROCEDURE — 80053 COMPREHEN METABOLIC PANEL: CPT

## 2021-11-10 ENCOUNTER — OFFICE VISIT (OUTPATIENT)
Dept: FAMILY MEDICINE CLINIC | Facility: CLINIC | Age: 71
End: 2021-11-10
Payer: COMMERCIAL

## 2021-11-10 VITALS
TEMPERATURE: 98.5 F | RESPIRATION RATE: 16 BRPM | OXYGEN SATURATION: 98 % | WEIGHT: 162 LBS | HEIGHT: 63 IN | BODY MASS INDEX: 28.7 KG/M2 | SYSTOLIC BLOOD PRESSURE: 142 MMHG | DIASTOLIC BLOOD PRESSURE: 84 MMHG | HEART RATE: 81 BPM

## 2021-11-10 DIAGNOSIS — G89.29 CHRONIC PAIN OF RIGHT KNEE: ICD-10-CM

## 2021-11-10 DIAGNOSIS — Z00.00 MEDICARE ANNUAL WELLNESS VISIT, SUBSEQUENT: Primary | ICD-10-CM

## 2021-11-10 DIAGNOSIS — I10 BENIGN ESSENTIAL HYPERTENSION: ICD-10-CM

## 2021-11-10 DIAGNOSIS — E78.2 MIXED HYPERLIPIDEMIA: ICD-10-CM

## 2021-11-10 DIAGNOSIS — M25.561 CHRONIC PAIN OF RIGHT KNEE: ICD-10-CM

## 2021-11-10 PROCEDURE — 1125F AMNT PAIN NOTED PAIN PRSNT: CPT | Performed by: FAMILY MEDICINE

## 2021-11-10 PROCEDURE — 1036F TOBACCO NON-USER: CPT | Performed by: FAMILY MEDICINE

## 2021-11-10 PROCEDURE — 3008F BODY MASS INDEX DOCD: CPT | Performed by: FAMILY MEDICINE

## 2021-11-10 PROCEDURE — G0439 PPPS, SUBSEQ VISIT: HCPCS | Performed by: FAMILY MEDICINE

## 2021-11-10 PROCEDURE — 99214 OFFICE O/P EST MOD 30 MIN: CPT | Performed by: FAMILY MEDICINE

## 2021-11-10 PROCEDURE — 1160F RVW MEDS BY RX/DR IN RCRD: CPT | Performed by: FAMILY MEDICINE

## 2021-11-10 PROCEDURE — 3725F SCREEN DEPRESSION PERFORMED: CPT | Performed by: FAMILY MEDICINE

## 2021-11-10 PROCEDURE — 3288F FALL RISK ASSESSMENT DOCD: CPT | Performed by: FAMILY MEDICINE

## 2021-11-10 PROCEDURE — 1101F PT FALLS ASSESS-DOCD LE1/YR: CPT | Performed by: FAMILY MEDICINE

## 2021-11-10 PROCEDURE — 1170F FXNL STATUS ASSESSED: CPT | Performed by: FAMILY MEDICINE

## 2021-12-29 ENCOUNTER — RA CDI HCC (OUTPATIENT)
Dept: OTHER | Facility: HOSPITAL | Age: 71
End: 2021-12-29

## 2022-01-04 ENCOUNTER — HOSPITAL ENCOUNTER (OUTPATIENT)
Dept: MAMMOGRAPHY | Facility: CLINIC | Age: 72
Discharge: HOME | End: 2022-01-04
Payer: COMMERCIAL

## 2022-01-04 VITALS — HEIGHT: 63 IN | WEIGHT: 162 LBS | BODY MASS INDEX: 28.7 KG/M2

## 2022-01-04 DIAGNOSIS — R92.8 ABNORMAL MAMMOGRAM: ICD-10-CM

## 2022-01-04 PROCEDURE — 77065 DX MAMMO INCL CAD UNI: CPT

## 2022-01-04 PROCEDURE — G0279 TOMOSYNTHESIS, MAMMO: HCPCS

## 2022-01-05 ENCOUNTER — OFFICE VISIT (OUTPATIENT)
Dept: FAMILY MEDICINE CLINIC | Facility: CLINIC | Age: 72
End: 2022-01-05
Payer: COMMERCIAL

## 2022-01-05 VITALS
WEIGHT: 161 LBS | OXYGEN SATURATION: 98 % | DIASTOLIC BLOOD PRESSURE: 80 MMHG | SYSTOLIC BLOOD PRESSURE: 156 MMHG | BODY MASS INDEX: 28.53 KG/M2 | TEMPERATURE: 98.6 F | RESPIRATION RATE: 12 BRPM | HEART RATE: 80 BPM | HEIGHT: 63 IN

## 2022-01-05 DIAGNOSIS — I10 BENIGN ESSENTIAL HYPERTENSION: ICD-10-CM

## 2022-01-05 DIAGNOSIS — Z01.818 PREOPERATIVE CLEARANCE: Primary | ICD-10-CM

## 2022-01-05 DIAGNOSIS — H25.9 AGE-RELATED CATARACT OF BOTH EYES, UNSPECIFIED AGE-RELATED CATARACT TYPE: ICD-10-CM

## 2022-01-05 PROCEDURE — 1036F TOBACCO NON-USER: CPT | Performed by: FAMILY MEDICINE

## 2022-01-05 PROCEDURE — 3079F DIAST BP 80-89 MM HG: CPT | Performed by: FAMILY MEDICINE

## 2022-01-05 PROCEDURE — 3008F BODY MASS INDEX DOCD: CPT | Performed by: FAMILY MEDICINE

## 2022-01-05 PROCEDURE — 3077F SYST BP >= 140 MM HG: CPT | Performed by: FAMILY MEDICINE

## 2022-01-05 PROCEDURE — 99214 OFFICE O/P EST MOD 30 MIN: CPT | Performed by: FAMILY MEDICINE

## 2022-01-05 RX ORDER — POLYMYXIN B SULFATE AND TRIMETHOPRIM 1; 10000 MG/ML; [USP'U]/ML
SOLUTION OPHTHALMIC
COMMUNITY
Start: 2022-01-03 | End: 2022-01-05

## 2022-01-05 RX ORDER — PREDNISOLONE ACETATE 10 MG/ML
SUSPENSION/ DROPS OPHTHALMIC
COMMUNITY
Start: 2022-01-03

## 2022-01-05 RX ORDER — KETOROLAC TROMETHAMINE 5 MG/ML
SOLUTION OPHTHALMIC
COMMUNITY
Start: 2022-01-03 | End: 2022-01-05

## 2022-01-05 NOTE — PROGRESS NOTES
Assessment/Plan:    No problem-specific Assessment & Plan notes found for this encounter  Diagnoses and all orders for this visit:    Age-related cataract of both eyes, unspecified age-related cataract type  Comments:  mgmt per ophtho    Preoperative clearance  Comments:  asymptomatic from cv standpoint  low risk for low risk sugery  form completed    Benign essential hypertension  Comments:  home bp's acceptable  continue current meds      Other orders  -     Discontinue: ketorolac (ACULAR) 0 5 % ophthalmic solution;  (Patient not taking: Reported on 1/5/2022 )  -     Discontinue: polymyxin b-trimethoprim (POLYTRIM) ophthalmic solution;  (Patient not taking: Reported on 1/5/2022 )  -     prednisoLONE acetate (PRED FORTE) 1 % ophthalmic suspension;  (Patient not taking: Reported on 1/5/2022 )        Subjective:      Patient ID: Kishan Grigsby is a 80 y o  female  HPI  Pt presents for pre-op eval for upcoming OU cataract extraction/lens implantation 1/14 OD and 1/28 OS with Dr Maris Ravi at [facility] under sedation and local       Has noticed decreased vision over the last year  Worse at night  Some halos  No flashes of light or floaters  Pt is doing well in general   Watches blood pressure at home (always high in office) and they are 130s/70-85  She denies chest pain, shortness of breath, n/v, abd pain, paresthesias, weakness  Can walk up 2 flights with groceries without chest pain, shortness of breath  Can walk on flat surface without being limited by chest pain, shortness of breath, calf pain  The following portions of the patient's history were reviewed and updated as appropriate: allergies, current medications, past family history, past medical history, past social history, past surgical history and problem list     Review of Systems   Constitutional: Negative for chills, fatigue, fever and unexpected weight change     HENT: Negative for congestion, ear pain, hearing loss, postnasal drip, rhinorrhea, sinus pressure, sinus pain, sore throat, trouble swallowing and voice change  Eyes: Negative for pain, redness and visual disturbance  Respiratory: Negative for cough and shortness of breath  Cardiovascular: Negative for chest pain, palpitations and leg swelling  Gastrointestinal: Negative for abdominal pain, constipation, diarrhea and nausea  Endocrine: Negative for cold intolerance, heat intolerance, polydipsia and polyuria  Genitourinary: Negative for dysuria, frequency and urgency  Musculoskeletal: Negative for arthralgias, joint swelling and myalgias  Skin: Negative for rash  No suspicious lesions   Neurological: Negative for weakness, numbness and headaches  Hematological: Negative for adenopathy  Objective:      /80   Pulse 80   Temp 98 6 °F (37 °C)   Resp 12   Ht 5' 2 5" (1 588 m)   Wt 73 kg (161 lb)   SpO2 98%   BMI 28 98 kg/m²          Physical Exam  Constitutional:       General: She is not in acute distress  Appearance: She is well-developed  HENT:      Head: Normocephalic and atraumatic  Right Ear: Tympanic membrane, ear canal and external ear normal       Left Ear: Tympanic membrane, ear canal and external ear normal       Nose: Nose normal       Mouth/Throat:      Pharynx: No oropharyngeal exudate  Eyes:      Conjunctiva/sclera: Conjunctivae normal       Pupils: Pupils are equal, round, and reactive to light  Comments: OU cataracts   Neck:      Thyroid: No thyromegaly  Vascular: No carotid bruit or JVD  Cardiovascular:      Rate and Rhythm: Regular rhythm  Heart sounds: S1 normal and S2 normal  No murmur heard  No friction rub  No gallop  No S3 or S4 sounds  Pulmonary:      Effort: Pulmonary effort is normal       Breath sounds: Normal breath sounds  No wheezing, rhonchi or rales  Abdominal:      General: Bowel sounds are normal  There is no distension  Palpations: Abdomen is soft  Tenderness: There is no abdominal tenderness  Lymphadenopathy:      Cervical: No cervical adenopathy  Neurological:      Mental Status: She is alert and oriented to person, place, and time  Cranial Nerves: No cranial nerve deficit  Sensory: No sensory deficit  Deep Tendon Reflexes: Reflexes are normal and symmetric

## 2022-01-24 DIAGNOSIS — R32 URINARY INCONTINENCE, UNSPECIFIED TYPE: ICD-10-CM

## 2022-01-25 RX ORDER — MIRABEGRON 50 MG/1
50 TABLET, FILM COATED, EXTENDED RELEASE ORAL DAILY
Qty: 90 TABLET | Refills: 1 | Status: SHIPPED | OUTPATIENT
Start: 2022-01-25 | End: 2022-07-18 | Stop reason: SDUPTHER

## 2022-02-14 ENCOUNTER — OFFICE VISIT (OUTPATIENT)
Dept: DERMATOLOGY | Facility: CLINIC | Age: 72
End: 2022-02-14
Payer: COMMERCIAL

## 2022-02-14 VITALS — BODY MASS INDEX: 29.63 KG/M2 | HEIGHT: 62 IN | WEIGHT: 161 LBS | TEMPERATURE: 98.3 F

## 2022-02-14 DIAGNOSIS — D23.9 DILATED PORE OF WINER: Primary | ICD-10-CM

## 2022-02-14 PROCEDURE — 3008F BODY MASS INDEX DOCD: CPT | Performed by: DERMATOLOGY

## 2022-02-14 PROCEDURE — 1160F RVW MEDS BY RX/DR IN RCRD: CPT | Performed by: DERMATOLOGY

## 2022-02-14 PROCEDURE — 1036F TOBACCO NON-USER: CPT | Performed by: DERMATOLOGY

## 2022-02-14 PROCEDURE — 99202 OFFICE O/P NEW SF 15 MIN: CPT | Performed by: DERMATOLOGY

## 2022-02-14 NOTE — PROGRESS NOTES
[facility] Note     Patient Name: José Talley  Encounter Date: 02/14/2022     Have you been cared for by a [facility] in the last 3 years and, if so, which one? No    · Have you traveled outside of the [address] [place] [address] in the past 3 months or outside of the [facility] area in the last 2 weeks? No     May we call your Preferred Phone number to discuss your specific medical information? Yes     May we leave a detailed message that includes your specific medical information? Yes      Today's Chief Concerns:   Concern #1:  Growth on right breast        Past Medical History:  Have you personally ever had or currently have any of the following? · Skin cancer (such as Melanoma, Basal Cell Carcinoma, Squamous Cell Carcinoma? (If Yes, please provide more detail)- No  · Eczema: No  · Psoriasis: No  · HIV/AIDS: No  · Hepatitis B or C: No  · Tuberculosis: No  · Systemic Immunosuppression such as Diabetes, Biologic or Immunotherapy, Chemotherapy, Organ Transplantation, Bone Marrow Transplantation (If YES, please provide more detail): No  · Radiation Treatment (If YES, please provide more detail): No  · Any other major medical conditions/concerns? (If Yes, which types)- No    Social History:     What is/was your primary occupation? Retired      What are your hobbies/past-times? Grand kids     Family History:  Have any of your "first degree relatives" (parent, brother, sister, or child) had any of the following       · Skin cancer such as Melanoma or Merkel Cell Carcinoma or Pancreatic Cancer? No  · Eczema, Asthma, Hay Fever or Seasonal Allergies: No  · Psoriasis or Psoriatic Arthritis: No  · Do any other medical conditions seem to run in your family? If Yes, what condition and which relatives?   No    Current Medications:   (please update all dermatological medications before printing patient's AVS!)      Current Outpatient Medications:     amLODIPine (NORVASC) 5 mg tablet, Take 1 tablet (5 mg total) by mouth daily, Disp: 90 tablet, Rfl: 3    aspirin 81 MG tablet, Take 81 mg by mouth Daily, Disp: , Rfl:     clobetasol (TEMOVATE) 0 05 % ointment, Apply topically 2 (two) times a day, Disp: 30 g, Rfl: 3    hydrochlorothiazide (MICROZIDE) 12 5 mg capsule, Take 1 capsule (12 5 mg total) by mouth every morning, Disp: 90 capsule, Rfl: 3    Mirabegron ER (Myrbetriq) 50 MG TB24, Take 1 tablet (50 mg total) by mouth daily, Disp: 90 tablet, Rfl: 1    pravastatin (PRAVACHOL) 20 mg tablet, Take 1 tablet (20 mg total) by mouth daily, Disp: 90 tablet, Rfl: 3    prednisoLONE acetate (PRED FORTE) 1 % ophthalmic suspension, , Disp: , Rfl:       Review of Systems:  Have you recently had or currently have any of the following? If YES, what are you doing for the problem? · Fever, chills or unintended weight loss: No  · Sudden loss or change in your vision: No  · Nausea, vomiting or blood in your stool: No  · Painful or swollen joints: No  · Wheezing or cough: No  · Changing mole or non-healing wound: No  · Nosebleeds: No  · Excessive sweating: No  · Easy or prolonged bleeding? No  · Over the last 2 weeks, how often have you been bothered by the following problems? · Taking little interest or pleasure in doing things: 1 - Not at All  · Feeling down, depressed, or hopeless: 1 - Not at All  · Rapid heartbeat with epinephrine:  No    · FEMALES ONLY:    · Are you pregnant or planning to become pregnant? No  · Are you currently or planning to be nursing or breast feeding? No    · Any known allergies?       Allergies   Allergen Reactions    Ibandronic Acid          Physical Exam:     Was a chaperone (Derm Clinical Assistant) present throughout the entire Physical Exam? Yes      CONSTITUTIONAL:   Vitals:    02/14/22 1238   Temp: 98 3 °F (36 8 °C)   TempSrc: Temporal   Weight: 73 kg (161 lb)   Height: 5' 2" (1 575 m)         PSYCH: Normal mood and affect  EYES: Normal conjunctiva  ENT: Normal lips and oral mucosa  CARDIOVASCULAR: No edema  RESPIRATORY: Normal respirations  HEME/LYMPH/IMMUNO:  No regional lymphadenopathy except as noted below in "ASSESSMENT AND PLAN BY DIAGNOSIS"    SKIN:  FULL ORGAN SYSTEM EXAM   Hair, Scalp, Ears, Face Normal except as noted below in Assessment   Neck, Cervical Chain Nodes Normal except as noted below in Assessment   Right Arm/Hand/Fingers Normal except as noted below in Assessment   Left Arm/Hand/Fingers Normal except as noted below in Assessment   Chest/Breasts/Axillae Viewed areas Normal except as noted below in Assessment   Abdomen, Umbilicus    Back/Spine    Groin/Genitalia/Buttocks    Right Leg, Foot, Toes    Left Leg, Foot, Toes         Assessment and Plan by Diagnosis:    History of Present Condition:     Duration:  How long has this been an issue for you?    o  2 years    Location Affected:  Where on the body is this affecting you?    o  under right breast    Quality:  Is there any bleeding, pain, itch, burning/irritation, or redness associated with the skin lesion? o  irritating    Severity:  Describe any bleeding, pain, itch, burning/irritation, or redness on a scale of 1 to 10 (with 10 being the worst)  o  5   Timing:  Does this condition seem to be there pretty constantly or do you notice it more at specific times throughout the day?     o  constant    Context:  Have you ever noticed that this condition seems to be associated with specific activities you do?    o  denies    Modifying Factors:    o Anything that seems to make the condition worse?    -  while wearing bra it becomes irritated   o What have you tried to do to make the condition better?    -  denies    Associated Signs and Symptoms:  Does this skin lesion seem to be associated with any of the following:  o  SL AMB DERM SIGNS AND SYMPTOMS: Itching and Scratching     DILATED PORE  Physical Exam:   Anatomic Location Affected:  Right shoulder    Morphological Description:  Patulous opening with keratinous debris   Pertinent Positives:   Pertinent Negatives: Additional History of Present Condition:  Patient presents with spot of concern      Assessment and Plan:  Based on a thorough discussion of this condition and the management approach to it (including a comprehensive discussion of the known risks, side effects and potential benefits of treatment), the patient (family) agrees to implement the following specific plan:   Reassured benign    Excision removal (30 mins) discussed if area fills up in the future     Scribe Attestation    I,:  Alison Brown am acting as a scribe while in the presence of the attending physician :       I,:  Praveen Dupont MD personally performed the services described in this documentation    as scribed in my presence :

## 2022-02-14 NOTE — PATIENT INSTRUCTIONS
DILATED PORE    Assessment and Plan:  Based on a thorough discussion of this condition and the management approach to it (including a comprehensive discussion of the known risks, side effects and potential benefits of treatment), the patient (family) agrees to implement the following specific plan:   Reassured benign    Excision removal (30 mins) discussed if area fills up in the future

## 2022-02-21 ENCOUNTER — TELEPHONE (OUTPATIENT)
Dept: FAMILY MEDICINE CLINIC | Facility: CLINIC | Age: 72
End: 2022-02-21

## 2022-03-07 NOTE — TELEPHONE ENCOUNTER
Patient was diagnosed with shingles at the [facility], they gave her an anti viral but did not prescribe the Gabapentin, she does not need it at this time but would like for you to be aware in case she does call in and needs it for the pain, this is just an [employer] Odomzo [facility]- I discussed with the patient the risks of Odomzo including but not limited to nausea, vomiting, diarrhea, constipation, weight loss, changes in the sense of taste, decreased appetite, muscle spasms, and hair loss.  The patient verbalized understanding of the proper use and possible adverse effects of Odomzo.  All of the patient's questions and concerns were addressed.

## 2022-07-06 ENCOUNTER — HOSPITAL ENCOUNTER (OUTPATIENT)
Dept: MAMMOGRAPHY | Facility: CLINIC | Age: 72
Discharge: HOME | End: 2022-07-06
Payer: COMMERCIAL

## 2022-07-06 DIAGNOSIS — R92.8 ABNORMAL MAMMOGRAM: ICD-10-CM

## 2022-07-06 PROCEDURE — 77066 DX MAMMO INCL CAD BI: CPT

## 2022-07-06 PROCEDURE — G0279 TOMOSYNTHESIS, MAMMO: HCPCS

## 2022-07-07 DIAGNOSIS — R92.8 ABNORMAL MAMMOGRAM: Primary | ICD-10-CM

## 2022-07-18 DIAGNOSIS — R32 URINARY INCONTINENCE, UNSPECIFIED TYPE: ICD-10-CM

## 2022-07-19 RX ORDER — MIRABEGRON 50 MG/1
50 TABLET, FILM COATED, EXTENDED RELEASE ORAL DAILY
Qty: 90 TABLET | Refills: 0 | Status: SHIPPED | OUTPATIENT
Start: 2022-07-19 | End: 2022-10-18 | Stop reason: SDUPTHER

## 2022-07-19 NOTE — TELEPHONE ENCOUNTER
Medication refill requested: Mirabegron ER    Last office visit: 01/05/22  Next office visit: N/A  Last refilled: 1/25/22  Labs: No  Ordering Provider: Ezio Pena,       Pharmacy (select pharmacy send RX to):       [address] [address]  Phone: [phone] Fax: [phone]

## 2022-08-08 DIAGNOSIS — I10 ESSENTIAL HYPERTENSION: ICD-10-CM

## 2022-08-08 DIAGNOSIS — E78.2 MIXED HYPERLIPIDEMIA: ICD-10-CM

## 2022-08-08 RX ORDER — HYDROCHLOROTHIAZIDE 12.5 MG/1
CAPSULE, GELATIN COATED ORAL
Qty: 90 CAPSULE | Refills: 3 | OUTPATIENT
Start: 2022-08-08

## 2022-08-08 RX ORDER — AMLODIPINE BESYLATE 5 MG/1
TABLET ORAL
Qty: 90 TABLET | Refills: 3 | OUTPATIENT
Start: 2022-08-08

## 2022-08-08 RX ORDER — PRAVASTATIN SODIUM 20 MG
TABLET ORAL
Qty: 90 TABLET | Refills: 3 | OUTPATIENT
Start: 2022-08-08

## 2022-08-09 DIAGNOSIS — E78.2 MIXED HYPERLIPIDEMIA: ICD-10-CM

## 2022-08-09 DIAGNOSIS — I10 ESSENTIAL HYPERTENSION: ICD-10-CM

## 2022-08-09 RX ORDER — PRAVASTATIN SODIUM 20 MG
20 TABLET ORAL DAILY
Qty: 90 TABLET | Refills: 0 | Status: SHIPPED | OUTPATIENT
Start: 2022-08-09 | End: 2022-10-27 | Stop reason: SDUPTHER

## 2022-08-09 RX ORDER — AMLODIPINE BESYLATE 5 MG/1
5 TABLET ORAL DAILY
Qty: 90 TABLET | Refills: 0 | Status: SHIPPED | OUTPATIENT
Start: 2022-08-09 | End: 2022-10-27 | Stop reason: SDUPTHER

## 2022-08-09 RX ORDER — HYDROCHLOROTHIAZIDE 12.5 MG/1
12.5 CAPSULE, GELATIN COATED ORAL EVERY MORNING
Qty: 90 CAPSULE | Refills: 0 | Status: SHIPPED | OUTPATIENT
Start: 2022-08-09 | End: 2022-10-27 | Stop reason: SDUPTHER

## 2022-10-18 DIAGNOSIS — R32 URINARY INCONTINENCE, UNSPECIFIED TYPE: ICD-10-CM

## 2022-10-18 RX ORDER — MIRABEGRON 50 MG/1
50 TABLET, FILM COATED, EXTENDED RELEASE ORAL DAILY
Qty: 90 TABLET | Refills: 0 | Status: SHIPPED | OUTPATIENT
Start: 2022-10-18

## 2022-10-27 DIAGNOSIS — E78.2 MIXED HYPERLIPIDEMIA: ICD-10-CM

## 2022-10-27 DIAGNOSIS — I10 ESSENTIAL HYPERTENSION: ICD-10-CM

## 2022-10-27 RX ORDER — PRAVASTATIN SODIUM 20 MG
20 TABLET ORAL DAILY
Qty: 90 TABLET | Refills: 0 | Status: SHIPPED | OUTPATIENT
Start: 2022-10-27

## 2022-10-27 RX ORDER — HYDROCHLOROTHIAZIDE 12.5 MG/1
12.5 CAPSULE, GELATIN COATED ORAL EVERY MORNING
Qty: 90 CAPSULE | Refills: 0 | Status: SHIPPED | OUTPATIENT
Start: 2022-10-27

## 2022-10-27 RX ORDER — AMLODIPINE BESYLATE 5 MG/1
5 TABLET ORAL DAILY
Qty: 90 TABLET | Refills: 0 | Status: SHIPPED | OUTPATIENT
Start: 2022-10-27

## 2022-11-15 ENCOUNTER — TELEMEDICINE (OUTPATIENT)
Dept: FAMILY MEDICINE CLINIC | Facility: CLINIC | Age: 72
End: 2022-11-15

## 2022-11-15 ENCOUNTER — TELEPHONE (OUTPATIENT)
Dept: FAMILY MEDICINE CLINIC | Facility: CLINIC | Age: 72
End: 2022-11-15

## 2022-11-15 VITALS
BODY MASS INDEX: 27.82 KG/M2 | HEIGHT: 63 IN | WEIGHT: 157 LBS | DIASTOLIC BLOOD PRESSURE: 69 MMHG | SYSTOLIC BLOOD PRESSURE: 128 MMHG

## 2022-11-15 DIAGNOSIS — E78.2 MIXED HYPERLIPIDEMIA: ICD-10-CM

## 2022-11-15 DIAGNOSIS — E55.9 VITAMIN D DEFICIENCY: ICD-10-CM

## 2022-11-15 DIAGNOSIS — Z00.00 ENCOUNTER FOR SUBSEQUENT ANNUAL WELLNESS VISIT (AWV) IN MEDICARE PATIENT: Primary | ICD-10-CM

## 2022-11-15 DIAGNOSIS — I10 BENIGN ESSENTIAL HYPERTENSION: ICD-10-CM

## 2022-11-15 NOTE — PROGRESS NOTES
Assessment and Plan:     Problem List Items Addressed This Visit        Cardiovascular and Mediastinum    Benign essential hypertension    Relevant Orders    TSH, 3rd generation with Free T4 reflex       Other    Hyperlipidemia    Relevant Orders    TSH, 3rd generation with Free T4 reflex    Lipid panel      Other Visit Diagnoses     Encounter for subsequent annual wellness visit (AWV) in Medicare patient    -  Primary    obtain labs  obtain shingrix/covid booster at a pharmacy  healthy diet and exericse  declines dexa    Vitamin D deficiency        Relevant Orders    Vitamin D 25 hydroxy        BMI Counseling: Body mass index is 28 26 kg/m²  The BMI is above normal  Nutrition recommendations include encouraging healthy choices of fruits and vegetables  Exercise recommendations include exercising 3-5 times per week  Rationale for BMI follow-up plan is due to patient being overweight or obese  Depression Screening and Follow-up Plan: Patient was screened for depression during today's encounter  They screened negative with a PHQ-2 score of 0  Urinary Incontinence Plan of Care: counseling topics discussed: limit alcohol, caffeine, spicy foods, and acidic foods  Preventive health issues were discussed with patient, and age appropriate screening tests were ordered as noted in patient's After Visit Summary  Personalized health advice and appropriate referrals for health education or preventive services given if needed, as noted in patient's After Visit Summary  History of Present Illness:     Patient presents for a Medicare Wellness Visit    Pt presents for virtual awv  Blood pressure controlled at home  TKR in January  Feeling well  Preventively, pt declines dexa  Up to date on mammogram   Due for labs  Due for shingrix and covid booster  imm's otherwise up to date  No concerns    Has a living will and DPOA     Patient Care Team:  Kit Stroud DO as PCP - General (Family Medicine)     Review of Systems:     Review of Systems   Constitutional: Negative for chills, fatigue, fever and unexpected weight change  HENT: Negative for congestion, ear pain, hearing loss, postnasal drip, rhinorrhea, sinus pressure, sinus pain, sore throat, trouble swallowing and voice change  Eyes: Negative for pain, redness and visual disturbance  Respiratory: Negative for cough and shortness of breath  Cardiovascular: Negative for chest pain, palpitations and leg swelling  Gastrointestinal: Negative for abdominal pain, constipation, diarrhea and nausea  Endocrine: Negative for cold intolerance, heat intolerance, polydipsia and polyuria  Genitourinary: Negative for dysuria, frequency and urgency  Musculoskeletal: Negative for arthralgias, joint swelling and myalgias  Skin: Negative for rash  No suspicious lesions   Neurological: Negative for weakness, numbness and headaches  Hematological: Negative for adenopathy          Problem List:     Patient Active Problem List   Diagnosis   • Benign essential hypertension   • Hyperlipidemia   • Osteoporosis   • Chronic pain of right knee      Past Medical and Surgical History:     Past Medical History:   Diagnosis Date   • Cataract     bilateral    • Hyperlipidemia    • Hypertension    • Osteopenia    • Urinary incontinence      Past Surgical History:   Procedure Laterality Date   • INCONTINENCE SURGERY     • TUBAL LIGATION        Family History:     Family History   Problem Relation Age of Onset   • Hypertension Mother    • No Known Problems Father    • Diabetes Maternal Grandmother    • No Known Problems Maternal Grandfather    • No Known Problems Paternal Grandmother    • No Known Problems Paternal Grandfather    • No Known Problems Sister    • No Known Problems Daughter    • No Known Problems Son    • No Known Problems Daughter    • No Known Problems Son    • No Known Problems Maternal Aunt    • No Known Problems Paternal Aunt    • Breast cancer Neg Hx    • Breast cancer additional onset Neg Hx    • Colon cancer Neg Hx    • Endometrial cancer Neg Hx    • Ovarian cancer Neg Hx    • BRCA 1/2 Neg Hx    • BRCA1 Negative Neg Hx    • BRCA1 Positive Neg Hx    • BRCA2 Negative Neg Hx    • BRCA2 Positive Neg Hx       Social History:     Social History     Socioeconomic History   • Marital status:      Spouse name: Malathi Holguin    • Number of children: 3   • Years of education: None   • Highest education level: None   Occupational History   • Occupation: Retired    Tobacco Use   • Smoking status: Former Smoker     Packs/day: 1 00     Years: 10 00     Pack years: 10 00     Types: Cigarettes     Quit date: 1985     Years since quittin 0   • Smokeless tobacco: Never Used   Vaping Use   • Vaping Use: Never used   Substance and Sexual Activity   • Alcohol use: Yes   • Drug use: Never   • Sexual activity: Not Currently   Other Topics Concern   • None   Social History Narrative    3 children, Dave Mena        RN     Social Determinants of Health     Financial Resource Strain: Low Risk    • Difficulty of Paying Living Expenses: Not very hard   Food Insecurity: Not on file   Transportation Needs: No Transportation Needs   • Lack of Transportation (Medical): No   • Lack of Transportation (Non-Medical):  No   Physical Activity: Not on file   Stress: Not on file   Social Connections: Not on file   Intimate Partner Violence: Not on file   Housing Stability: Not on file      Medications and Allergies:     Current Outpatient Medications   Medication Sig Dispense Refill   • amLODIPine (NORVASC) 5 mg tablet Take 1 tablet (5 mg total) by mouth daily 90 tablet 0   • aspirin 81 MG tablet Take 81 mg by mouth Daily     • clobetasol (TEMOVATE) 0 05 % ointment Apply topically 2 (two) times a day 30 g 3   • hydrochlorothiazide (MICROZIDE) 12 5 mg capsule Take 1 capsule (12 5 mg total) by mouth every morning 90 capsule 0   • Mirabegron ER (Myrbetriq) 50 MG TB24 Take 1 tablet (50 mg total) by mouth daily 90 tablet 0   • pravastatin (PRAVACHOL) 20 mg tablet Take 1 tablet (20 mg total) by mouth daily 90 tablet 0     No current facility-administered medications for this visit  Allergies   Allergen Reactions   • Ibandronic Acid       Immunizations:     Immunization History   Administered Date(s) Administered   • BCG 01/18/2012   • COVID-19 PFIZER VACCINE 0 3 ML IM 02/14/2021, 03/07/2021, 10/16/2021   • Pneumococcal Conjugate 13-Valent 06/20/2017   • Pneumococcal Polysaccharide PPV23 07/03/2018   • Tdap 11/26/2013      Health Maintenance:         Topic Date Due   • Breast Cancer Screening: Mammogram  07/06/2023   • Colorectal Cancer Screening  02/03/2024   • Hepatitis C Screening  Completed         Topic Date Due   • COVID-19 Vaccine (4 - Booster for Pfizer series) 02/16/2022      Medicare Screening Tests and Risk Assessments:     Marium García is here for her Subsequent Wellness visit  Health Risk Assessment:   Patient rates overall health as very good  Patient feels that their physical health rating is same  Patient is very satisfied with their life  Eyesight was rated as same  Hearing was rated as same  Patient feels that their emotional and mental health rating is same  Patients states they are never, rarely angry  Patient states they are sometimes unusually tired/fatigued  Pain experienced in the last 7 days has been some  Patient's pain rating has been 4/10  Patient states that she has experienced no weight loss or gain in last 6 months  Depression Screening:   PHQ-2 Score: 0      Fall Risk Screening: In the past year, patient has experienced: no history of falling in past year      Urinary Incontinence Screening:   Patient has leaked urine accidently in the last six months  Home Safety:  Patient has trouble with stairs inside or outside of their home  Patient has working smoke alarms and has working carbon monoxide detector  Home safety hazards include: none  Nutrition:   Current diet is Regular  Medications:   Patient is currently taking over-the-counter supplements  OTC medications include: see medication list  Patient is able to manage medications  Activities of Daily Living (ADLs)/Instrumental Activities of Daily Living (IADLs):   Walk and transfer into and out of bed and chair?: Yes  Dress and groom yourself?: Yes    Bathe or shower yourself?: Yes    Feed yourself? Yes  Do your laundry/housekeeping?: Yes  Manage your money, pay your bills and track your expenses?: Yes  Make your own meals?: Yes    Do your own shopping?: Yes    Previous Hospitalizations:   Any hospitalizations or ED visits within the last 12 months?: No      Advance Care Planning:   Living will: Yes    Durable POA for healthcare: Yes    Advanced directive: Yes      Cognitive Screening:   Provider or family/friend/caregiver concerned regarding cognition?: No    PREVENTIVE SCREENINGS      Cardiovascular Screening:    General: Screening Not Indicated and History Lipid Disorder    Due for: Lipid Panel      Diabetes Screening:     General: Risks and Benefits Discussed    Due for: Blood Glucose      Colorectal Cancer Screening:     General: Screening Current      Breast Cancer Screening:     General: Screening Current      Cervical Cancer Screening:    General: Screening Not Indicated      Osteoporosis Screening:    General: Screening Not Indicated, History Osteoporosis and Patient Declines      Abdominal Aortic Aneurysm (AAA) Screening:        General: Screening Not Indicated      Lung Cancer Screening:     General: Screening Not Indicated      Hepatitis C Screening:    General: Screening Current    Screening, Brief Intervention, and Referral to Treatment (SBIRT)    Screening  Typical number of drinks in a day: 0  Typical number of drinks in a week: 2  Interpretation: Low risk drinking behavior  AUDIT-C Screenin) How often did you have a drink containing alcohol in the past year? monthly or less  2) How many drinks did you have on a typical day when you were drinking in the past year? 1 to 2  3) How often did you have 6 or more drinks on one occasion in the past year? never    AUDIT-C Score: 1  Interpretation: Score 0-2 (female): Negative screen for alcohol misuse    No exam data present     Physical Exam:     /69   Ht 5' 2 5" (1 588 m) Comment: pulled from last office visit  Wt 71 2 kg (157 lb) Comment: pt reported    BMI 28 26 kg/m²     Physical Exam     Suzie Rebolledo, DO

## 2022-11-15 NOTE — TELEPHONE ENCOUNTER
Spoke to patient and obtained the below Pre-op information:    Name of procedure:R knee replacement  Diagnosis:   Date of procedure (within 27 days):1/31/23  Surgeon:Dr Wally Landers  Location of procedure (hospital or out patient facility name): [facility] date/location/ type (Which labs? EKG?  CXR?): labs & chest xray @ [facility]  EKG to be done at visit  Records (on epic or requested): EPIC  Type of anaesthesia:  [facility]  Paperwork to complete for Pre-op (patient bringing vs  calling office to obtain prior to appointment): faxing form  Pre-op appointment scheduled (date/time): 1/6/23 @ 12pm

## 2022-12-29 ENCOUNTER — RA CDI HCC (OUTPATIENT)
Dept: OTHER | Facility: HOSPITAL | Age: 72
End: 2022-12-29

## 2022-12-29 NOTE — PROGRESS NOTES
[facility]  coding opportunities       Chart reviewed, no opportunity found:   [address]        Patients Insurance     Medicare Insurance: [employer]

## 2023-01-03 ENCOUNTER — APPOINTMENT (OUTPATIENT)
Dept: LAB | Facility: CLINIC | Age: 73
End: 2023-01-03

## 2023-01-03 DIAGNOSIS — E55.9 VITAMIN D DEFICIENCY: ICD-10-CM

## 2023-01-03 DIAGNOSIS — I10 BENIGN ESSENTIAL HYPERTENSION: ICD-10-CM

## 2023-01-03 DIAGNOSIS — Z01.812 PRE-OPERATIVE LABORATORY EXAMINATION: ICD-10-CM

## 2023-01-03 DIAGNOSIS — E78.2 MIXED HYPERLIPIDEMIA: ICD-10-CM

## 2023-01-03 LAB
ALBUMIN SERPL BCP-MCNC: 4.4 G/DL (ref 3.5–5)
ALP SERPL-CCNC: 62 U/L (ref 34–104)
ALT SERPL W P-5'-P-CCNC: 11 U/L (ref 7–52)
ANION GAP SERPL CALCULATED.3IONS-SCNC: 6 MMOL/L (ref 4–13)
AST SERPL W P-5'-P-CCNC: 14 U/L (ref 13–39)
BASOPHILS # BLD AUTO: 0.03 THOUSANDS/ÂΜL (ref 0–0.1)
BASOPHILS NFR BLD AUTO: 1 % (ref 0–1)
BILIRUB SERPL-MCNC: 0.58 MG/DL (ref 0.2–1)
BUN SERPL-MCNC: 14 MG/DL (ref 5–25)
CALCIUM SERPL-MCNC: 9.4 MG/DL (ref 8.4–10.2)
CHLORIDE SERPL-SCNC: 105 MMOL/L (ref 96–108)
CHOLEST SERPL-MCNC: 183 MG/DL
CO2 SERPL-SCNC: 28 MMOL/L (ref 21–32)
CREAT SERPL-MCNC: 0.88 MG/DL (ref 0.6–1.3)
EOSINOPHIL # BLD AUTO: 0.21 THOUSAND/ÂΜL (ref 0–0.61)
EOSINOPHIL NFR BLD AUTO: 4 % (ref 0–6)
ERYTHROCYTE [DISTWIDTH] IN BLOOD BY AUTOMATED COUNT: 11.9 % (ref 11.6–15.1)
GFR SERPL CREATININE-BSD FRML MDRD: 66 ML/MIN/1.73SQ M
GLUCOSE P FAST SERPL-MCNC: 95 MG/DL (ref 65–99)
HCT VFR BLD AUTO: 41.9 % (ref 34.8–46.1)
HDLC SERPL-MCNC: 64 MG/DL
HGB BLD-MCNC: 13.9 G/DL (ref 11.5–15.4)
IMM GRANULOCYTES # BLD AUTO: 0.01 THOUSAND/UL (ref 0–0.2)
IMM GRANULOCYTES NFR BLD AUTO: 0 % (ref 0–2)
LDLC SERPL CALC-MCNC: 94 MG/DL (ref 0–100)
LYMPHOCYTES # BLD AUTO: 1.25 THOUSANDS/ÂΜL (ref 0.6–4.47)
LYMPHOCYTES NFR BLD AUTO: 23 % (ref 14–44)
MCH RBC QN AUTO: 30.3 PG (ref 26.8–34.3)
MCHC RBC AUTO-ENTMCNC: 33.2 G/DL (ref 31.4–37.4)
MCV RBC AUTO: 92 FL (ref 82–98)
MONOCYTES # BLD AUTO: 0.61 THOUSAND/ÂΜL (ref 0.17–1.22)
MONOCYTES NFR BLD AUTO: 11 % (ref 4–12)
NEUTROPHILS # BLD AUTO: 3.3 THOUSANDS/ÂΜL (ref 1.85–7.62)
NEUTS SEG NFR BLD AUTO: 61 % (ref 43–75)
NONHDLC SERPL-MCNC: 119 MG/DL
NRBC BLD AUTO-RTO: 0 /100 WBCS
PLATELET # BLD AUTO: 245 THOUSANDS/UL (ref 149–390)
PMV BLD AUTO: 9.7 FL (ref 8.9–12.7)
POTASSIUM SERPL-SCNC: 3.7 MMOL/L (ref 3.5–5.3)
PROT SERPL-MCNC: 7.1 G/DL (ref 6.4–8.4)
RBC # BLD AUTO: 4.58 MILLION/UL (ref 3.81–5.12)
SODIUM SERPL-SCNC: 139 MMOL/L (ref 135–147)
TRIGL SERPL-MCNC: 123 MG/DL
TSH SERPL DL<=0.05 MIU/L-ACNC: 2.85 UIU/ML (ref 0.45–4.5)
WBC # BLD AUTO: 5.41 THOUSAND/UL (ref 4.31–10.16)

## 2023-01-04 LAB — 25(OH)D3 SERPL-MCNC: 30.1 NG/ML (ref 30–100)

## 2023-01-05 ENCOUNTER — EVALUATION (OUTPATIENT)
Dept: PHYSICAL THERAPY | Facility: CLINIC | Age: 73
End: 2023-01-05

## 2023-01-05 DIAGNOSIS — M25.561 CHRONIC PAIN OF RIGHT KNEE: Primary | ICD-10-CM

## 2023-01-05 DIAGNOSIS — G89.29 CHRONIC PAIN OF RIGHT KNEE: Primary | ICD-10-CM

## 2023-01-05 NOTE — PROGRESS NOTES
PT Evaluation     Today's date: 2023  Patient name: Jennifer Torres  : 1950  MRN: [id number]  Referring provider: Abida Kaiser MD  Dx:   Encounter Diagnosis     ICD-10-CM    1  Chronic pain of right knee  M25 561     G89 [id number]                      Assessment  Assessment details: Cher Nichols is a 70 yo female schedule for R TKA 2023  Pt demonstrates significantly decreased R knee A/PROM and R knee flex/ext strength leading to limitations with standing, walking, stair climbing, ADLs and caring for grand children  Pt would benefit from skilled physical therapy interventions in order to address the stated impairments, decrease pain with function and increase activity tolerance in order to improve quality of life  No further referral appears necessary at this time based on examination results  Prognosis is good given HEP compliance and PT 1-2/wk   Positive prognostic indicators include positive attitude toward recovery  Please contact me if you have any questions or recommendations  Thank you for the opportunity to share in Amairani's care      Impairments: abnormal coordination, abnormal gait, abnormal muscle firing, abnormal muscle tone, abnormal or restricted ROM, activity intolerance, impaired balance, impaired physical strength, lacks appropriate home exercise program, pain with function and weight-bearing intolerance    Symptom irritability: lowBarriers to therapy: Copay  Understanding of Dx/Px/POC: good   Prognosis: good    Goals  To be achieved in weeks following surgery 2023    Short Term Goals:  Pt will report decreased levels of pain by at least 2 subjective ratings in 4 weeks  Pt will demonstrate improved ROM by at least 10 degrees in 4 weeks  Pt will demonstrate improved strength by ½ grade in 4 weeks  Pt will be able to walk 100 feet for household distances without RW in 4 weeks    Long Term Goals:  Pt will be independent with HEP in 8 weeks  Pt will be pain free with ADL's in 8 weeks  Pt will be able to walk 1 mile for exercise and dog walking without AD in 8 weeks      Plan  Patient would benefit from: skilled physical therapy  Referral necessary: No  Planned therapy interventions: abdominal trunk stabilization, body mechanics training, balance/weight bearing training, functional ROM exercises, gait training, graded activity, graded exercise, home exercise program, joint mobilization, manual therapy, muscle pump exercises, patient education, strengthening, stretching, therapeutic activities and therapeutic exercise  Frequency: 2x week  Duration in weeks: 20  Plan of Care beginning date: 2023  Plan of Care expiration date: 3/30/2023  Treatment plan discussed with: patient        Subjective Evaluation    History of Present Illness  Mechanism of injury: Pt reports greater than 2 year history of knee pain and limited function  States pain levels have been very mild, more stiffness and "uncomfortableness" limits function  Pt is scheduled for R TKA 2023            Recurrent probem    Quality of life: fair    Pain  Current pain ratin  At best pain ratin  At worst pain ratin  Quality: dull ache  Relieving factors: change in position and rest  Aggravating factors: standing, stair climbing, walking and lifting  Progression: no change    Social Support  Steps to enter house: yes  Stairs in house: yes   Lives in: apartment  Lives with: adult children    Employment status: not working  Hand dominance: right      Diagnostic Tests  X-ray: abnormal  Treatments  Previous treatment: injection treatment  Patient Goals  Patient goals for therapy: decreased pain, improved balance, increased motion, increased strength, independence with ADLs/IADLs and return to sport/leisure activities          Objective     Active Range of Motion   Left Knee   Flexion: 121 degrees   Extension: 5 degrees     Right Knee   Flexion: 110 degrees   Extension: 17 degrees     Mobility   Patellar Mobility:     Right Knee Hypomobile: medial, superior and inferior     Strength/Myotome Testing     Left Knee   Flexion: 4+  Extension: 4+  Quadriceps contraction: good    Right Knee   Flexion: 3+  Extension: 3+  Quadriceps contraction: fair             Precautions: HTN, Osteoporosis      Manuals 1/5            R knee PROM                                                    Neuro Re-Ed             Quad sets 3x30x2"            SLR w/ quad set 3x10                                      Ther Ex             Heel slides 3x10            SAQ 3x10            LAQ             Heel prop                                                                 Ther Activity             STS             Step ups/downs             Gait Training             Ambulation                          Modalities

## 2023-01-05 NOTE — LETTER
2023      No Recipients    Patient: Jennifer Torres   YOB: 1950   Date of Visit: 2023     Encounter Diagnosis     ICD-10-CM    1  Chronic pain of right knee  M25 561     G89 29           Dear Dr Asaf Fan: Thank you for your recent referral of Jennifer Torres  Please review the attached evaluation summary from Amairani's recent visit  Please verify that you agree with the plan of care by signing the attached order  If you have any questions or concerns, please do not hesitate to call  I sincerely appreciate the opportunity to share in the care of one of your patients and hope to have another opportunity to work with you in the near future  Sincerely,    Sha Gleason, PT      Referring Provider:      I certify that I have read the below Plan of Care and certify the need for these services furnished under this plan of treatment while under my care  No Recipients          PT Evaluation     Today's date: 2023  Patient name: Jennifer Torres  : 1950  MRN: [id number]  Referring provider: Abida Kaiser MD  Dx:   Encounter Diagnosis     ICD-10-CM    1  Chronic pain of right knee  M25 561     G89 29                      Assessment  Assessment details: Cher Nichols is a 70 yo female schedule for R TKA 2023  Pt demonstrates significantly decreased R knee A/PROM and R knee flex/ext strength leading to limitations with standing, walking, stair climbing, ADLs and caring for grand children  Pt would benefit from skilled physical therapy interventions in order to address the stated impairments, decrease pain with function and increase activity tolerance in order to improve quality of life  No further referral appears necessary at this time based on examination results  Prognosis is good given HEP compliance and PT 1-2/wk   Positive prognostic indicators include positive attitude toward recovery  Please contact me if you have any questions or recommendations   Thank you for the opportunity to share in Amairani's care  Impairments: abnormal coordination, abnormal gait, abnormal muscle firing, abnormal muscle tone, abnormal or restricted ROM, activity intolerance, impaired balance, impaired physical strength, lacks appropriate home exercise program, pain with function and weight-bearing intolerance    Symptom irritability: lowBarriers to therapy: Copay  Understanding of Dx/Px/POC: good   Prognosis: good    Goals  To be achieved in weeks following surgery 1/31/2023    Short Term Goals:  Pt will report decreased levels of pain by at least 2 subjective ratings in 4 weeks  Pt will demonstrate improved ROM by at least 10 degrees in 4 weeks  Pt will demonstrate improved strength by ½ grade in 4 weeks  Pt will be able to walk 100 feet for household distances without RW in 4 weeks    Long Term Goals:  Pt will be independent with HEP in 8 weeks  Pt will be pain free with ADL's in 8 weeks  Pt will be able to walk 1 mile for exercise and dog walking without AD in 8 weeks      Plan  Patient would benefit from: skilled physical therapy  Referral necessary: No  Planned therapy interventions: abdominal trunk stabilization, body mechanics training, balance/weight bearing training, functional ROM exercises, gait training, graded activity, graded exercise, home exercise program, joint mobilization, manual therapy, muscle pump exercises, patient education, strengthening, stretching, therapeutic activities and therapeutic exercise  Frequency: 2x week  Duration in weeks: 20  Plan of Care beginning date: 1/5/2023  Plan of Care expiration date: 3/30/2023  Treatment plan discussed with: patient        Subjective Evaluation    History of Present Illness  Mechanism of injury: Pt reports greater than 2 year history of knee pain and limited function  States pain levels have been very mild, more stiffness and "uncomfortableness" limits function  Pt is scheduled for R TKA 1/31/2023            Recurrent probem    Quality of life: fair    Pain  Current pain ratin  At best pain ratin  At worst pain ratin  Quality: dull ache  Relieving factors: change in position and rest  Aggravating factors: standing, stair climbing, walking and lifting  Progression: no change    Social Support  Steps to enter house: yes  Stairs in house: yes   Lives in: apartment  Lives with: adult children    Employment status: not working  Hand dominance: right      Diagnostic Tests  X-ray: abnormal  Treatments  Previous treatment: injection treatment  Patient Goals  Patient goals for therapy: decreased pain, improved balance, increased motion, increased strength, independence with ADLs/IADLs and return to sport/leisure activities          Objective     Active Range of Motion   Left Knee   Flexion: 121 degrees   Extension: 5 degrees     Right Knee   Flexion: 110 degrees   Extension: 17 degrees     Mobility   Patellar Mobility:     Right Knee   Hypomobile: medial, superior and inferior     Strength/Myotome Testing     Left Knee   Flexion: 4+  Extension: 4+  Quadriceps contraction: good    Right Knee   Flexion: 3+  Extension: 3+  Quadriceps contraction: fair            Precautions: HTN, Osteoporosis      Manuals 1/5            R knee PROM                                                    Neuro Re-Ed             Quad sets 3x30x2"            SLR w/ quad set 3x10                                      Ther Ex             Heel slides 3x10            SAQ 3x10            LAQ             Heel prop                                                                 Ther Activity             STS             Step ups/downs             Gait Training             Ambulation                          Modalities

## 2023-01-06 ENCOUNTER — OFFICE VISIT (OUTPATIENT)
Dept: FAMILY MEDICINE CLINIC | Facility: CLINIC | Age: 73
End: 2023-01-06

## 2023-01-06 VITALS
RESPIRATION RATE: 16 BRPM | HEART RATE: 72 BPM | WEIGHT: 158.4 LBS | OXYGEN SATURATION: 97 % | TEMPERATURE: 99 F | DIASTOLIC BLOOD PRESSURE: 72 MMHG | SYSTOLIC BLOOD PRESSURE: 142 MMHG | HEIGHT: 62 IN | BODY MASS INDEX: 29.15 KG/M2

## 2023-01-06 DIAGNOSIS — R94.31 ABNORMAL EKG: ICD-10-CM

## 2023-01-06 DIAGNOSIS — Z01.818 PREOPERATIVE CLEARANCE: Primary | ICD-10-CM

## 2023-01-06 DIAGNOSIS — M17.11 PRIMARY OSTEOARTHRITIS OF RIGHT KNEE: ICD-10-CM

## 2023-01-06 DIAGNOSIS — I10 BENIGN ESSENTIAL HYPERTENSION: ICD-10-CM

## 2023-01-06 RX ORDER — CELECOXIB 200 MG/1
CAPSULE ORAL
COMMUNITY
Start: 2022-11-29

## 2023-01-06 NOTE — PROGRESS NOTES
Name: Jerman Lopez      : 1950      MRN: [id number]  Encounter Provider: Isadora Anne DO  Encounter Date: 2023   Encounter department: [address] [place] [address]  Preoperative clearance  Comments:  Suearemberto Degree risk 0 18%  asymptomatic from CV standpoint  has old infarct, rbbb, ? LVH on EKG, but she states these are old and had echo years ago in response which was normal    Will repeat echocardiogram   If nml, pt will be clear for surgery    2  Abnormal EKG  Comments:  check echo as above  Orders:  -     Echo complete w/ contrast if indicated; Future; Expected date: 2023    3  Primary osteoarthritis of right knee  Comments:  mgmt per ortho    4  Benign essential hypertension  Comments:  controlled on current meds  continue same           Subjective      HPI   Pt presents for preoperative clearance for R TKR with Dr Dominic Camejo at [facility] on  under [address]  Pt has had knee pain x 2 years  Has failed injections  Ready for replacement  In general, pt feels well  Home bps running in 120s  Tolerating meds  Reviewed recent labs which show nml cbc, cmp, lipids, tsh, vit D   EKG shows NSR at 70 bpm   Incomplete RBBB; minimal voltage criteria for LVH, may be normal variant; inferior infarct, age undetermined  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   She has no current or previous chest pain, shortness of breath, palps  She doesn't walk up stairs due to knee, but she walks up the hills/mountain at home daily without chest pain, shortness of breath  Can walk on a flat surface without chest pain, shortness of breath, calf pain  Review of Systems   Constitutional: Negative for chills, fatigue, fever and unexpected weight change  HENT: Negative for congestion, ear pain, hearing loss, postnasal drip, rhinorrhea, sinus pressure, sinus pain, sore throat, trouble swallowing and voice change      Eyes: Negative for pain, redness and visual disturbance  Respiratory: Negative for cough and shortness of breath  Cardiovascular: Negative for chest pain, palpitations and leg swelling  Gastrointestinal: Negative for abdominal pain, constipation, diarrhea and nausea  Endocrine: Negative for cold intolerance, heat intolerance, polydipsia and polyuria  Genitourinary: Negative for dysuria, frequency and urgency  Musculoskeletal: Positive for arthralgias  Negative for joint swelling and myalgias  Skin: Negative for rash  No suspicious lesions   Neurological: Negative for weakness, numbness and headaches  Hematological: Negative for adenopathy  Current Outpatient Medications on File Prior to Visit   Medication Sig   • amLODIPine (NORVASC) 5 mg tablet Take 1 tablet (5 mg total) by mouth daily   • aspirin 81 MG tablet Take 81 mg by mouth Daily   • celecoxib (CeleBREX) 200 mg capsule After surgery   • clobetasol (TEMOVATE) 0 05 % ointment Apply topically 2 (two) times a day (Patient taking differently: Apply topically 2 (two) times a week)   • hydrochlorothiazide (MICROZIDE) 12 5 mg capsule Take 1 capsule (12 5 mg total) by mouth every morning   • Mirabegron ER (Myrbetriq) 50 MG TB24 Take 1 tablet (50 mg total) by mouth daily   • mupirocin (BACTROBAN) 2 % ointment APPLY TWICE DAILY TO NOSTRIL STARTING 5 DAYS BEFORE SURGERY   • pravastatin (PRAVACHOL) 20 mg tablet Take 1 tablet (20 mg total) by mouth daily       Objective     /72   Pulse 72   Temp 99 °F (37 2 °C)   Resp 16   Ht 5' 2" (1 575 m)   Wt 71 8 kg (158 lb 6 4 oz)   SpO2 97%   BMI 28 97 kg/m²     Physical Exam  Constitutional:       General: She is not in acute distress  Appearance: She is well-developed  HENT:      Head: Normocephalic and atraumatic        Right Ear: Tympanic membrane, ear canal and external ear normal       Left Ear: Tympanic membrane, ear canal and external ear normal       Nose: Nose normal       Mouth/Throat:      Pharynx: No oropharyngeal exudate  Eyes:      Conjunctiva/sclera: Conjunctivae normal       Pupils: Pupils are equal, round, and reactive to light  Neck:      Thyroid: No thyromegaly  Vascular: No carotid bruit or JVD  Cardiovascular:      Rate and Rhythm: Regular rhythm  Heart sounds: S1 normal and S2 normal  No murmur heard  No friction rub  No gallop  No S3 or S4 sounds  Pulmonary:      Effort: Pulmonary effort is normal       Breath sounds: Normal breath sounds  No wheezing, rhonchi or rales  Abdominal:      General: Bowel sounds are normal  There is no distension  Palpations: Abdomen is soft  Tenderness: There is no abdominal tenderness  Lymphadenopathy:      Cervical: No cervical adenopathy  Neurological:      Mental Status: She is alert and oriented to person, place, and time  Cranial Nerves: No cranial nerve deficit  Sensory: No sensory deficit  Deep Tendon Reflexes: Reflexes are normal and symmetric         Juan Rondon, DO

## 2023-01-09 ENCOUNTER — OFFICE VISIT (OUTPATIENT)
Dept: PHYSICAL THERAPY | Facility: CLINIC | Age: 73
End: 2023-01-09

## 2023-01-09 DIAGNOSIS — M25.561 CHRONIC PAIN OF RIGHT KNEE: Primary | ICD-10-CM

## 2023-01-09 DIAGNOSIS — G89.29 CHRONIC PAIN OF RIGHT KNEE: Primary | ICD-10-CM

## 2023-01-09 NOTE — PROGRESS NOTES
Daily Note     Today's date: 2023  Patient name: Selena Will  : 1950  MRN: [id number]  Referring provider: Salome Colby MD  Dx:   Encounter Diagnosis     ICD-10-CM    1  Chronic pain of right knee  M25 561     G89 29                      Subjective: Pt reports no significant change since last visit  Still has little to no pain and is most limited by decreased ROM  Objective: See treatment diary below      Assessment: Pt demonstrates decreased R knee A/PROM  Pt did have good quad contraction on R, fair quad contraction on L  Pt has good tolerance to SAQ, LAQ strengthening  Performed gait training on ground and on step w/ RW  Pt demonstrates good understanding of proper gait pattern with RW  Plan: Continue per plan of care  Progress treatment as tolerated  Pt will follow up after surgery and short course of home PT        Precautions: HTN, Osteoporosis      Manuals            R knee PROM  4 min                                                  Neuro Re-Ed             Quad sets 3x30x2" 3x30x2"           SLR w/ quad set 3x10 3x10                                     Ther Ex             Heel slides 3x10            SAQ 3x10 3x10 2#           LAQ  3x10 2#           Heel prop                                                                 Ther Activity             STS             Step ups/downs             Gait Training             Ambulation  RW training 10 min                        Modalities

## 2023-01-22 DIAGNOSIS — R32 URINARY INCONTINENCE, UNSPECIFIED TYPE: ICD-10-CM

## 2023-01-23 RX ORDER — MIRABEGRON 50 MG/1
50 TABLET, FILM COATED, EXTENDED RELEASE ORAL DAILY
Qty: 90 TABLET | Refills: 0 | Status: SHIPPED | OUTPATIENT
Start: 2023-01-23

## 2023-01-25 ENCOUNTER — HOSPITAL ENCOUNTER (OUTPATIENT)
Dept: NON INVASIVE DIAGNOSTICS | Facility: CLINIC | Age: 73
Discharge: HOME | End: 2023-01-25

## 2023-01-25 ENCOUNTER — PATIENT MESSAGE (OUTPATIENT)
Dept: FAMILY MEDICINE CLINIC | Facility: CLINIC | Age: 73
End: 2023-01-25

## 2023-01-25 VITALS
BODY MASS INDEX: 29.08 KG/M2 | DIASTOLIC BLOOD PRESSURE: 72 MMHG | HEIGHT: 62 IN | WEIGHT: 158 LBS | SYSTOLIC BLOOD PRESSURE: 142 MMHG | HEART RATE: 72 BPM

## 2023-01-25 DIAGNOSIS — E78.2 MIXED HYPERLIPIDEMIA: ICD-10-CM

## 2023-01-25 DIAGNOSIS — I10 ESSENTIAL HYPERTENSION: ICD-10-CM

## 2023-01-25 DIAGNOSIS — R94.31 ABNORMAL EKG: ICD-10-CM

## 2023-01-25 LAB
AORTIC ROOT: 3.2 CM
APICAL FOUR CHAMBER EJECTION FRACTION: 68 %
ASCENDING AORTA: 2.9 CM
E WAVE DECELERATION TIME: 179 MS
FRACTIONAL SHORTENING: 37 % (ref 28–44)
INTERVENTRICULAR SEPTUM IN DIASTOLE (PARASTERNAL SHORT AXIS VIEW): 1.3 CM
INTERVENTRICULAR SEPTUM: 1.3 CM (ref 0.6–1.1)
LAAS-AP2: 18 CM2
LAAS-AP4: 10.1 CM2
LEFT ATRIUM SIZE: 3.5 CM
LEFT INTERNAL DIMENSION IN SYSTOLE: 2.4 CM (ref 2.1–4)
LEFT VENTRICULAR INTERNAL DIMENSION IN DIASTOLE: 3.8 CM (ref 3.5–6)
LEFT VENTRICULAR POSTERIOR WALL IN END DIASTOLE: 1 CM
LEFT VENTRICULAR STROKE VOLUME: 40 ML
LVSV (TEICH): 40 ML
MV E'TISSUE VEL-SEP: 11 CM/S
MV PEAK A VEL: 0.89 M/S
MV PEAK E VEL: 68 CM/S
MV STENOSIS PRESSURE HALF TIME: 52 MS
MV VALVE AREA P 1/2 METHOD: 4.23 CM2
RA PRESSURE ESTIMATED: 3 MMHG
RIGHT ATRIUM AREA SYSTOLE A4C: 12.4 CM2
RIGHT VENTRICLE ID DIMENSION: 3.4 CM
RV PSP: 26 MMHG
SL CV LEFT ATRIUM LENGTH A2C: 5.8 CM
SL CV LV EF: 60
SL CV PED ECHO LEFT VENTRICLE DIASTOLIC VOLUME (MOD BIPLANE) 2D: 61 ML
SL CV PED ECHO LEFT VENTRICLE SYSTOLIC VOLUME (MOD BIPLANE) 2D: 21 ML
TR MAX PG: 23 MMHG
TR PEAK VELOCITY: 2.4 M/S
TRICUSPID ANNULAR PLANE SYSTOLIC EXCURSION: 2.1 CM
TRICUSPID VALVE PEAK REGURGITATION VELOCITY: 2.42 M/S

## 2023-01-25 RX ORDER — PRAVASTATIN SODIUM 20 MG
TABLET ORAL
Qty: 90 TABLET | Refills: 3 | Status: SHIPPED | OUTPATIENT
Start: 2023-01-25

## 2023-01-25 RX ORDER — HYDROCHLOROTHIAZIDE 12.5 MG/1
CAPSULE, GELATIN COATED ORAL
Qty: 90 CAPSULE | Refills: 3 | Status: SHIPPED | OUTPATIENT
Start: 2023-01-25

## 2023-01-25 RX ORDER — AMLODIPINE BESYLATE 5 MG/1
TABLET ORAL
Qty: 90 TABLET | Refills: 3 | Status: SHIPPED | OUTPATIENT
Start: 2023-01-25

## 2023-01-27 ENCOUNTER — TELEPHONE (OUTPATIENT)
Dept: FAMILY MEDICINE CLINIC | Facility: CLINIC | Age: 73
End: 2023-01-27

## 2023-01-27 NOTE — TELEPHONE ENCOUNTER
Please send my last note to [facility] for clearance for upcoming surgery on 1/31

## 2023-01-31 ENCOUNTER — APPOINTMENT (EMERGENCY)
Dept: RADIOLOGY | Facility: HOSPITAL | Age: 73
End: 2023-01-31

## 2023-01-31 ENCOUNTER — HOSPITAL ENCOUNTER (EMERGENCY)
Facility: HOSPITAL | Age: 73
Discharge: HOME | End: 2023-01-31
Attending: EMERGENCY MEDICINE

## 2023-01-31 VITALS
SYSTOLIC BLOOD PRESSURE: 182 MMHG | OXYGEN SATURATION: 99 % | RESPIRATION RATE: 18 BRPM | DIASTOLIC BLOOD PRESSURE: 78 MMHG | HEART RATE: 73 BPM | TEMPERATURE: 98.1 F

## 2023-01-31 DIAGNOSIS — I48.91 ATRIAL FIBRILLATION WITH RVR (HCC): Primary | ICD-10-CM

## 2023-01-31 DIAGNOSIS — I48.91 ATRIAL FIBRILLATION (HCC): ICD-10-CM

## 2023-01-31 LAB
2HR DELTA HS TROPONIN: 0 NG/L
ALBUMIN SERPL BCP-MCNC: 4.2 G/DL (ref 3.5–5)
ALP SERPL-CCNC: 76 U/L (ref 46–116)
ALT SERPL W P-5'-P-CCNC: 18 U/L (ref 12–78)
ANION GAP SERPL CALCULATED.3IONS-SCNC: 6 MMOL/L (ref 4–13)
AST SERPL W P-5'-P-CCNC: 14 U/L (ref 5–45)
ATRIAL RATE: 76 BPM
BASOPHILS # BLD AUTO: 0.03 THOUSANDS/ÂΜL (ref 0–0.1)
BASOPHILS NFR BLD AUTO: 1 % (ref 0–1)
BILIRUB SERPL-MCNC: 0.51 MG/DL (ref 0.2–1)
BUN SERPL-MCNC: 12 MG/DL (ref 5–25)
CALCIUM SERPL-MCNC: 9.5 MG/DL (ref 8.3–10.1)
CARDIAC TROPONIN I PNL SERPL HS: 3 NG/L
CARDIAC TROPONIN I PNL SERPL HS: 3 NG/L
CHLORIDE SERPL-SCNC: 107 MMOL/L (ref 96–108)
CO2 SERPL-SCNC: 28 MMOL/L (ref 21–32)
CREAT SERPL-MCNC: 0.76 MG/DL (ref 0.6–1.3)
D DIMER PPP FEU-MCNC: 0.49 UG/ML FEU
EOSINOPHIL # BLD AUTO: 0.19 THOUSAND/ÂΜL (ref 0–0.61)
EOSINOPHIL NFR BLD AUTO: 4 % (ref 0–6)
ERYTHROCYTE [DISTWIDTH] IN BLOOD BY AUTOMATED COUNT: 11.9 % (ref 11.6–15.1)
GFR SERPL CREATININE-BSD FRML MDRD: 78 ML/MIN/1.73SQ M
GLUCOSE SERPL-MCNC: 109 MG/DL (ref 65–140)
HCT VFR BLD AUTO: 42.4 % (ref 34.8–46.1)
HGB BLD-MCNC: 14.3 G/DL (ref 11.5–15.4)
IMM GRANULOCYTES # BLD AUTO: 0 THOUSAND/UL (ref 0–0.2)
IMM GRANULOCYTES NFR BLD AUTO: 0 % (ref 0–2)
LYMPHOCYTES # BLD AUTO: 1.29 THOUSANDS/ÂΜL (ref 0.6–4.47)
LYMPHOCYTES NFR BLD AUTO: 25 % (ref 14–44)
MCH RBC QN AUTO: 30.6 PG (ref 26.8–34.3)
MCHC RBC AUTO-ENTMCNC: 33.7 G/DL (ref 31.4–37.4)
MCV RBC AUTO: 91 FL (ref 82–98)
MONOCYTES # BLD AUTO: 0.66 THOUSAND/ÂΜL (ref 0.17–1.22)
MONOCYTES NFR BLD AUTO: 13 % (ref 4–12)
NEUTROPHILS # BLD AUTO: 2.99 THOUSANDS/ÂΜL (ref 1.85–7.62)
NEUTS SEG NFR BLD AUTO: 57 % (ref 43–75)
NRBC BLD AUTO-RTO: 0 /100 WBCS
P AXIS: 72 DEGREES
PLATELET # BLD AUTO: 265 THOUSANDS/UL (ref 149–390)
PMV BLD AUTO: 9.6 FL (ref 8.9–12.7)
POTASSIUM SERPL-SCNC: 3.8 MMOL/L (ref 3.5–5.3)
PR INTERVAL: 170 MS
PROT SERPL-MCNC: 7.5 G/DL (ref 6.4–8.4)
QRS AXIS: 32 DEGREES
QRSD INTERVAL: 98 MS
QT INTERVAL: 382 MS
QTC INTERVAL: 429 MS
RBC # BLD AUTO: 4.68 MILLION/UL (ref 3.81–5.12)
SODIUM SERPL-SCNC: 141 MMOL/L (ref 135–147)
T WAVE AXIS: 29 DEGREES
TSH SERPL DL<=0.05 MIU/L-ACNC: 1.71 UIU/ML (ref 0.45–4.5)
VENTRICULAR RATE: 76 BPM
WBC # BLD AUTO: 5.16 THOUSAND/UL (ref 4.31–10.16)

## 2023-01-31 RX ORDER — METOPROLOL SUCCINATE 25 MG/1
25 TABLET, EXTENDED RELEASE ORAL DAILY
Qty: 20 TABLET | Refills: 0 | Status: SHIPPED | OUTPATIENT
Start: 2023-01-31

## 2023-01-31 RX ORDER — METOPROLOL SUCCINATE 25 MG/1
25 TABLET, EXTENDED RELEASE ORAL ONCE
Status: COMPLETED | OUTPATIENT
Start: 2023-01-31 | End: 2023-01-31

## 2023-01-31 RX ADMIN — METOPROLOL SUCCINATE 25 MG: 25 TABLET, FILM COATED, EXTENDED RELEASE ORAL at 14:02

## 2023-01-31 NOTE — ED PROVIDER NOTES
History  Chief Complaint   Patient presents with   • Abnormal ECG     Pt was at ambulatory surgery center for knee replacement, in pre-op pt was in afib with RVR; pt denies symptoms; pt returned to NSR on own; pt states she has hx of tachycardia and has seen cardiology with no findings     Patient is 52-year-old female presenting for new onset A  fib  Past medical history significant for hypertension, hyperlipidemia  Patient also takes low-dose aspirin daily however has held for the last 5 days as patient has been preparing for right knee arthroplasty  Patient took her amlodipine last night hold her thiazide this morning per instructions for surgery  Patient was at surgery center today for preop eval when she said they noticed she was in A  fib on the monitor  Patient was then transported via EMS to [facility] ED for further eval and management  In route, patient converted back to sinus rhythm spontaneously with no intervention  Patient has been asymptomatic during entire episode aside from feeling like her heart is racing  Patient denies any chest pain, shortness of breath, lightheadedness, dizziness  Patient does report history of intermittent episodes of palpitations that appear random in onset, no exacerbating or alleviating factors though spontaneously resolved on their own  Her PCP is aware but have never been able to diagnose her with anything  On presentation to ER, patient is hypertensive but otherwise vital signs within normal limits, heart is regular rate rhythm and sinus rhythm  Lungs clear bilaterally to auscultation  Patient has no lower extremity swelling, negative Homans' sign  Prior to Admission Medications   Prescriptions Last Dose Informant Patient Reported? Taking?    Mirabegron ER (Myrbetriq) 50 MG TB24   No No   Sig: Take 1 tablet (50 mg total) by mouth daily   amLODIPine (NORVASC) 5 mg tablet   No No   Sig: TAKE 1 TABLET DAILY   aspirin 81 MG tablet   Yes No Sig: Take 81 mg by mouth Daily   celecoxib (CeleBREX) 200 mg capsule   Yes No   Sig: After surgery   clobetasol (TEMOVATE) 0 05 % ointment   No No   Sig: Apply topically 2 (two) times a day   Patient taking differently: Apply topically 2 (two) times a week   hydrochlorothiazide (MICROZIDE) 12 5 mg capsule   No No   Sig: TAKE 1 CAPSULE EVERY MORNING   mupirocin (BACTROBAN) 2 % ointment   Yes No   Sig: APPLY TWICE DAILY TO NOSTRIL STARTING 5 DAYS BEFORE SURGERY   pravastatin (PRAVACHOL) 20 mg tablet   No No   Sig: TAKE 1 TABLET DAILY      Facility-Administered Medications: None       Past Medical History:   Diagnosis Date   • Arthritis 05/2021    R knee replacement scheduled  01/23   • Cataract     bilateral    • Hyperlipidemia    • Hypertension    • Osteopenia    • Urinary incontinence        Past Surgical History:   Procedure Laterality Date   • EYE SURGERY  02/2022    Cateracts   • INCONTINENCE SURGERY     • TUBAL LIGATION         Family History   Problem Relation Age of Onset   • Hypertension Mother    • No Known Problems Father    • Diabetes Maternal Grandmother    • No Known Problems Maternal Grandfather    • No Known Problems Paternal Grandmother    • No Known Problems Paternal Grandfather    • No Known Problems Sister    • No Known Problems Daughter    • No Known Problems Son    • No Known Problems Daughter    • No Known Problems Son    • No Known Problems Maternal Aunt    • No Known Problems Paternal Aunt    • Breast cancer Neg Hx    • Breast cancer additional onset Neg Hx    • Colon cancer Neg Hx    • Endometrial cancer Neg Hx    • Ovarian cancer Neg Hx    • BRCA 1/2 Neg Hx    • BRCA1 Negative Neg Hx    • BRCA1 Positive Neg Hx    • BRCA2 Negative Neg Hx    • BRCA2 Positive Neg Hx      I have reviewed and agree with the history as documented      E-Cigarette/Vaping   • E-Cigarette Use Never User      E-Cigarette/Vaping Substances   • Nicotine No    • THC No    • CBD No    • Flavoring No      Social History Tobacco Use   • Smoking status: Former     Packs/day: 0 25     Years: 10 00     Pack years: 2 50     Types: Cigarettes     Quit date: 1985     Years since quittin 2   • Smokeless tobacco: Never   Vaping Use   • Vaping Use: Never used   Substance Use Topics   • Alcohol use: Yes   • Drug use: Never        Review of Systems   Constitutional: Negative  HENT: Negative  Eyes: Negative  Respiratory: Negative  Negative for shortness of breath  Cardiovascular: Positive for palpitations  Negative for chest pain  Gastrointestinal: Negative  Endocrine: Negative  Genitourinary: Negative  Musculoskeletal: Negative  Skin: Negative  Allergic/Immunologic: Negative  Neurological: Negative  Hematological: Negative  Psychiatric/Behavioral: Negative  Physical Exam  ED Triage Vitals [23 1041]   Temperature Pulse Respirations Blood Pressure SpO2   98 1 °F (36 7 °C) 86 16 (!) 210/86 98 %      Temp Source Heart Rate Source Patient Position - Orthostatic VS BP Location FiO2 (%)   Oral Monitor Lying Right arm --      Pain Score       --             Orthostatic Vital Signs  Vitals:    23 1145 23 1245 23 1355 23 1402   BP: (!) 187/79 152/67 (!) 184/78 (!) 182/78   Pulse: 66 84 73    Patient Position - Orthostatic VS: Lying Lying Sitting        Physical Exam  Vitals and nursing note reviewed  Constitutional:       Appearance: Normal appearance  She is normal weight  HENT:      Head: Normocephalic and atraumatic  Right Ear: Tympanic membrane, ear canal and external ear normal       Left Ear: Tympanic membrane, ear canal and external ear normal       Nose: Nose normal       Mouth/Throat:      Mouth: Mucous membranes are moist       Pharynx: Oropharynx is clear  Eyes:      Extraocular Movements: Extraocular movements intact  Conjunctiva/sclera: Conjunctivae normal       Pupils: Pupils are equal, round, and reactive to light     Cardiovascular: Rate and Rhythm: Normal rate and regular rhythm  Pulses: Normal pulses  Heart sounds: Normal heart sounds  Pulmonary:      Effort: Pulmonary effort is normal       Breath sounds: Normal breath sounds  Abdominal:      General: Abdomen is flat  Bowel sounds are normal       Palpations: Abdomen is soft  Musculoskeletal:         General: Normal range of motion  Cervical back: Normal range of motion and neck supple  Skin:     General: Skin is warm and dry  Capillary Refill: Capillary refill takes less than 2 seconds  Neurological:      General: No focal deficit present  Mental Status: She is alert and oriented to person, place, and time  Psychiatric:         Mood and Affect: Mood normal          Behavior: Behavior normal          Thought Content: Thought content normal          Judgment: Judgment normal          ED Medications  Medications   metoprolol succinate (TOPROL-XL) 24 hr tablet 25 mg (25 mg Oral Given 1/31/23 1402)       Diagnostic Studies  Results Reviewed     Procedure Component Value Units Date/Time    HS Troponin I 2hr [[id number]]  (Normal) Collected: 01/31/23 1241    Lab Status: Final result Specimen: Blood from Arm, Left Updated: 01/31/23 1337     hs TnI 2hr 3 ng/L      Delta 2hr hsTnI 0 ng/L     D-Dimer [[id number]]  (Normal) Collected: 01/31/23 1044    Lab Status: Final result Specimen: Blood from Arm, Left Updated: 01/31/23 1314     D-Dimer, Quant 0 49 ug/ml FEU     Narrative: In the evaluation for possible pulmonary embolism, in the appropriate (Well's Score of 4 or less) patient, the age adjusted d-dimer cutoff for this patient can be calculated as:    Age x 0 01 (in ug/mL) for Age-adjusted D-dimer exclusion threshold for a patient over 50 years  HS Troponin I 4hr [[id number]]     Lab Status: No result Specimen: Blood     TSH [[id number]] Collected: 01/31/23 1044    Lab Status:  In process Specimen: Blood from Arm, Left Updated: 01/31/23 1129    HS Troponin 0hr (reflex protocol) [[id number]]  (Normal) Collected: 01/31/23 1044    Lab Status: Final result Specimen: Blood from Arm, Left Updated: 01/31/23 1120     hs TnI 0hr 3 ng/L     Comprehensive metabolic panel [[id number]] Collected: 01/31/23 1044    Lab Status: Final result Specimen: Blood from Arm, Left Updated: 01/31/23 1113     Sodium 141 mmol/L      Potassium 3 8 mmol/L      Chloride 107 mmol/L      CO2 28 mmol/L      ANION GAP 6 mmol/L      BUN 12 mg/dL      Creatinine 0 76 mg/dL      Glucose 109 mg/dL      Calcium 9 5 mg/dL      AST 14 U/L      ALT 18 U/L      Alkaline Phosphatase 76 U/L      Total Protein 7 5 g/dL      Albumin 4 2 g/dL      Total Bilirubin 0 51 mg/dL      eGFR 78 ml/min/1 73sq m     Narrative:      [place] guidelines for Chronic Kidney Disease (CKD):   •  Stage 1 with normal or high GFR (GFR > 90 mL/min/1 73 square meters)  •  Stage 2 Mild CKD (GFR = 60-89 mL/min/1 73 square meters)  •  Stage 3A Moderate CKD (GFR = 45-59 mL/min/1 73 square meters)  •  Stage 3B Moderate CKD (GFR = 30-44 mL/min/1 73 square meters)  •  Stage 4 Severe CKD (GFR = 15-29 mL/min/1 73 square meters)  •  Stage 5 End Stage CKD (GFR <15 mL/min/1 73 square meters)  Note: GFR calculation is accurate only with a steady state creatinine    CBC and differential [[id number]]  (Abnormal) Collected: 01/31/23 1044    Lab Status: Final result Specimen: Blood from Arm, Left Updated: 01/31/23 1100     WBC 5 16 Thousand/uL      RBC 4 68 Million/uL      Hemoglobin 14 3 g/dL      Hematocrit 42 4 %      MCV 91 fL      MCH 30 6 pg      MCHC 33 7 g/dL      RDW 11 9 %      MPV 9 6 fL      Platelets 123 Thousands/uL      nRBC 0 /100 WBCs      Neutrophils Relative 57 %      Immat GRANS % 0 %      Lymphocytes Relative 25 %      Monocytes Relative 13 %      Eosinophils Relative 4 %      Basophils Relative 1 %      Neutrophils Absolute 2 99 Thousands/µL      Immature Grans Absolute 0 00 Thousand/uL Lymphocytes Absolute 1 29 Thousands/µL      Monocytes Absolute 0 66 Thousand/µL      Eosinophils Absolute 0 19 Thousand/µL      Basophils Absolute 0 03 Thousands/µL                  XR chest 1 view portable   Final Result by Deon Restrepo MD (01/31 1321)      No acute cardiopulmonary disease  Workstation performed: [id number]               Procedures  ECG 12 Lead Documentation Only    Date/Time: 1/31/2023 2:03 PM  Performed by: Raman Rankin MD  Authorized by: Raman Rankin MD     Indications / Diagnosis:  Afib  ECG reviewed by me, the ED Provider: yes    Patient location:  ED  Interpretation:     Interpretation: normal    Rate:     ECG rate:  76    ECG rate assessment: normal    Rhythm:     Rhythm: sinus rhythm    Ectopy:     Ectopy: none    QRS:     QRS axis:  Right    QRS intervals: Wide  Conduction:     Conduction: abnormal      Abnormal conduction: incomplete RBBB    ST segments:     ST segments:  Normal  T waves:     T waves: normal            ED Course  ED Course as of 01/31/23 1404   Tue Jan 31, 2023   1131 hs TnI 0hr: 3   1326 D-Dimer, Quant: 0 49                             SBIRT 22yo+    Flowsheet Row Most Recent Value   SBIRT (25 yo +)    In order to provide better care to our patients, we are screening all of our patients for alcohol and drug use  Would it be okay to ask you these screening questions?  Unable to answer at this time Filed at: 01/31/2023 1049          Wells' Criteria for PE    Flowsheet Row Most Recent Value   Wells' Criteria for PE    Clinical signs and symptoms of DVT 0 Filed at: 01/31/2023 1219   PE is primary diagnosis or equally likely 0 Filed at: 01/31/2023 1219   HR >100 0 Filed at: 01/31/2023 1219   Immobilization at least 3 days or Surgery in the previous 4 weeks 1 5 Filed at: 01/31/2023 1219   Previous, objectively diagnosed PE or DVT 0 Filed at: 01/31/2023 1219   Hemoptysis 0 Filed at: 01/31/2023 1219   Malignancy with treatment within 6 months or palliative 0 Filed at: 01/31/2023 1219   Wells' Criteria Total 1 5 Filed at: 01/31/2023 1219            Medical Decision Making  Patient 52-year-old female presenting for new onset A  Fib  Ddx: A  fib, PE, ACS, dissection, electrolyte abnormality  Based on patient presentation, patient history, and physical exam findings will obtain lab work to evaluate for any cardiac or pulmonary cause of A  fib  Will obtain CBC, CMP, troponin, D-dimer, EKG, chest x-ray  We will also reach out to cardiology- per cardiology based on patient EKG and patient's symptoms they are comfortable with following up as outpatient and placing patient on 25 daily of metoprolol succinate (per Dr Glenys Back)  Assuming negative lab work and patient maintained sinus rhythm while present in the ER, will plan for discharge and place patient on 25 daily of succinaye per cardiology with plans for cardiology follow-up as outpatient  Low concern at this time for dissection or ACS based on patient's symptoms  This patient has possibly had decreased ambulation due to her right knee problems will rule out PE with D-dimer  Atrial fibrillation [facility]): complicated acute illness or injury  Atrial fibrillation with RVR [facility]): complicated acute illness or injury  Amount and/or Complexity of Data Reviewed  Labs: ordered  Decision-making details documented in ED Course  Radiology: ordered  Risk  Prescription drug management              Disposition  Final diagnoses:   Atrial fibrillation ([facility] 75 )   Atrial fibrillation with RVR ([place] [address] )     Time reflects when diagnosis was documented in both MDM as applicable and the Disposition within this note     Time User Action Codes Description Comment    1/31/2023 11:31 AM Edgar Posadas Add [I48 91] Atrial fibrillation ([facility] )     1/31/2023 12:09 PM Yovana Green Add [I48 91] Atrial fibrillation with RVR ([facility] )     1/31/2023 12:22 PM Bossman Appl [I48 91] [facility] fibrillation ([facility] )     1/31/2023 12:22 PM Jaime Walker Modify [H03 15] Atrial fibrillation with RVR [facility])       ED Disposition     ED Disposition   Discharge    Condition   Stable    Date/Time   Tue Jan 31, 2023  1:53 PM    Comment   Song Church discharge to home/self care  Follow-up Information     Follow up With Specialties Details Why Contact Info Additional [address] Emergency Department Emergency Medicine Go to  If symptoms worsen [address] [address]  [address] [facility] 64 [facility], [address], [address]    Dala Serum, DO Family Medicine Go to  If symptoms worsen [facility]  [address] [facility] [address] Cardiology Schedule an appointment as soon as possible for a visit   [address]  [address] Cardiology [address], [address] [phone]          Patient's Medications   Discharge Prescriptions    METOPROLOL SUCCINATE (TOPROL-XL) 25 MG 24 HR TABLET    Take 1 tablet (25 mg total) by mouth daily       Start Date: 1/31/2023 End Date: --       Order Dose: 25 mg       Quantity: 20 tablet    Refills: 0         PDMP Review     None           ED Provider  Attending physically available and evaluated Song Church I managed the patient along with the ED Attending      Electronically Signed by         Natalio Chandra MD  01/31/23 5182       Natalio Chandra MD  01/31/23 7580

## 2023-01-31 NOTE — ED PROCEDURE NOTE
Procedure  POC Cardiac US    Date/Time: 1/31/2023 11:32 AM  Performed by: Krish Moran MD  Authorized by: Krish Moran MD     Patient location:  ED  Other Assisting Provider: Yes (comment) (Dr Jazmin Norton)    Procedure details:     Exam Type:  Diagnostic    Indications: suspected volume depletion      Indications comment:  Afib    Assessment / Evaluation for: cardiac function, pericardial effusion and intravascular volume status      Exam Type: initial exam      Image quality: diagnostic      Image availability:  Images available in PACS  Patient Details:     Cardiac Rhythm:  Regular    Mechanical ventilation: No    Cardiac findings:     Echo technique: limited 2D and M-mode      Views obtained: parasternal long axis, subcostal and apical      Pericardial effusion: absent      Tamponade physiology: absent      Wall motion: normal      LV systolic function: normal      RV dilation: none    Cardiac Calculations:     LVOT VTI (cm):  18 3    MAPSE (LV function):  2 2  Pulmonary findings:     Left Lung Findings: left lung sliding      Right lung findings: right lung sliding      B-lines: no B-lines present    IVC findings:     IVC Size: small      IVC Inspiratory Collapse: normal      Maximum IVC Diameter (cm):  1 23    Minimum IVC Diameter (cm):  0 29    IVC Variability (%):  76 42                     Krish Moran MD  01/31/23 1914

## 2023-01-31 NOTE — DISCHARGE INSTRUCTIONS
Please take 1 tablet of metoprolol candelaria (25mg) per cardiology instructions  Please follow up with cardiology for further evaluation and management

## 2023-01-31 NOTE — ED ATTENDING ATTESTATION
1/31/2023  IAyden MD, saw and evaluated the patient  I have discussed the patient with the resident/non-physician practitioner and agree with the resident's/non-physician practitioner's findings, Plan of Care, and MDM as documented in the resident's/non-physician practitioner's note, except where noted  All available labs and Radiology studies were reviewed  I was present for key portions of any procedure(s) performed by the resident/non-physician practitioner and I was immediately available to provide assistance  At this point I agree with the current assessment done in the Emergency Department  I have conducted an independent evaluation of this patient a history and physical is as follows: This is a 23-year-old female who presents to the emergency department with new atrial fibrillation  Patient has a long history of palpitations with numerous prior evaluations including a preoperative clearance echocardiogram done 3 weeks ago, with no findings  Patient was in the OR today for a right knee replacement and was noted to be in atrial fibrillation with rapid ventricular response  The patient did not have palpitations  She denies chest pain or shortness of breath  The patient is not taking blood thinners and had stopped her aspirin for the purpose of the procedure  The patient does not carry any prior diagnoses of thyroid disease or alcoholism  She converted to sinus rhythm in route to the hospital by ambulance, and is without complaint now  The patient states that related to her knee, she has not been laid up  She denies being immobilized  She has not been in a splint  She has not had any leg swelling  She denies active cancer  She does not have any personal history of DVT PE  Her review of systems is otherwise negative in 12 systems were reviewed  On exam the patient is hypertensive  She is a heart rate of 72  She is afebrile    She has normal work of breathing and normal perfusion  On HEENT exam, her face is symmetric  Membranes are moist   Neck is supple and nontender  She does not have JVD  Her heart is regular without murmurs, rubs, or gallops  Her lungs are clear and equal with no wheezes, rales, rhonchi  Her abdomen is soft and nontender with no masses, rebound, guarding  Her extremities are intact  She does not have lateralizing edema  She has good pulses in her extremities  She is neurologically nonfocal   Medical decision making: Patient with atrial fibrillation which is new, has since resolved  Patient is asymptomatic, and this may represent an intrinsic atrial dysrhythmia, but may also be secondary to PE or thyroid disease  Patient had stopped her aspirin and so it is possibly related to not being on her antiplatelet agents  We will plan to do a D-dimer as patient is low risk for PE  We will do cardiac evaluation and consult cardiology  Reassessment: Patient remains in sinus rhythm here  Her EKG was reviewed by me as were the strips from EMS  Discussed with cardiology who agree that if patient has negative evaluation here she may be discharged with metoprolol for new atrial fibrillation    ED Course         Critical Care Time  Procedures

## 2023-02-03 NOTE — PROGRESS NOTES
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 AC  AF  HTN  HLD  Had 1/25/23 echo for preop eval per pcp - was wnl  Prior cardiac evaluations for preop 2015 and also for palpitations in remote past, was put on ASA 81 daily years ago for palpitations over concern for AF, but was never able to document AF      Labs ok  trops wnl and flat  ldl 94  tsh wnl    TODAY's PLAN:  Warm, euvolemic, appears well  Never has had cardiac symptoms of any kind aside from intermittent palpitations  Same strong energy level x years, very active lifestyle  Home BP log at target - -120s consistantly    cw toprol xl 25 qd  SB to 46s today on ECG    chadsvasc 3 - warrants AC  Discussed risks and benefits and pt elects to start eliquis 5 bid today  DC ASA  No significant bleed Hx per pt    Strict RTC precautions if worse Sx develop    Advised her to discuss reduction/switch from mirabegron if able as this may contribute to HTN and tachycardia; risks/beneits discussion as she has had significant urinary Sx in past    Consider sleep study with PCP    Studies:    EKG - my read:  1/31/23: NSR, nl axis, no acute ischemic STT changes, LAE, iRBBB  2/6/23: SB to HR 50, nl axis, no acute ischemic STT changes, iRBBB        1/25/23 TTE:  •  Left Ventricle: Left ventricular cavity size is normal  Wall thickness is normal  The left ventricular ejection fraction is 60%  Systolic function is normal  Wall motion is normal  Diastolic function is normal for age  •  Right Ventricle: Right ventricular cavity size is normal  Systolic function is normal       HPI:   68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 found AF RVR  Continues feeling well, no recent Sx aside from intermittant palpitations x years  No significant bleeding Hx  No new CP/SOB/dizziness/palpitations/syncope  No new leg swelling, PND, pillow orthopnea, unintentional weight changes, fatigue  No toxic habits  No daytime sleepiness or snoring          Past Medical History:   Diagnosis Date   • Arthritis 05/2021    R knee replacement scheduled  01/23   • Cataract     bilateral    • Hyperlipidemia    • Hypertension    • Osteopenia    • Urinary incontinence        ROS:  10 point ROS negative except as specified in HPI    Allergies   Allergen Reactions   • Ibandronic Acid        Current Outpatient Medications   Medication Instructions   • amLODIPine (NORVASC) 5 mg tablet TAKE 1 TABLET DAILY   • apixaban (ELIQUIS) 5 mg, Oral, 2 times daily   • clobetasol (TEMOVATE) 0 05 % ointment Topical, 2 times daily   • hydrochlorothiazide (MICROZIDE) 12 5 mg capsule TAKE 1 CAPSULE EVERY MORNING   • metoprolol succinate (TOPROL-XL) 25 mg, Oral, Daily   • mupirocin (BACTROBAN) 2 % ointment APPLY TWICE DAILY TO NOSTRIL STARTING 5 DAYS BEFORE SURGERY   • Myrbetriq 50 mg, Oral, Daily   • pravastatin (PRAVACHOL) 20 mg tablet TAKE 1 TABLET DAILY        Social History     Socioeconomic History   • Marital status:      Spouse name: Karrie Garcia    • Number of children: 3   • Years of education: Not on file   • Highest education level: Not on file   Occupational History   • Occupation: Retired    Tobacco Use   • Smoking status: Former     Packs/day: 0 25     Years: 10 00     Pack years: 2 50     Types: Cigarettes     Quit date: 11/4/1985     Years since quittin 2   • Smokeless tobacco: Never   Vaping Use   • Vaping Use: Never used   Substance and Sexual Activity   • Alcohol use: Yes   • Drug use: Never   • Sexual activity: Not Currently     Birth control/protection: Post-menopausal   Other Topics Concern   • Not on file   Social History Narrative    3 children, Lukasz Estrella        RN     Social Determinants of Health     Financial Resource Strain: Low Risk    • Difficulty of Paying Living Expenses: Not very hard   Food Insecurity: Not on file   Transportation Needs: No Transportation Needs   • Lack of Transportation (Medical): No   • Lack of Transportation (Non-Medical):  No   Physical Activity: Not on file   Stress: Not on file   Social Connections: Not on file   Intimate Partner Violence: Not on file   Housing Stability: Not on file       Family History   Problem Relation Age of Onset   • Hypertension Mother    • No Known Problems Father    • Diabetes Maternal Grandmother    • No Known Problems Maternal Grandfather    • No Known Problems Paternal Grandmother    • No Known Problems Paternal Grandfather    • No Known Problems Sister    • No Known Problems Daughter    • No Known Problems Son    • No Known Problems Daughter    • No Known Problems Son    • No Known Problems Maternal Aunt    • No Known Problems Paternal Aunt    • Breast cancer Neg Hx    • Breast cancer additional onset Neg Hx    • Colon cancer Neg Hx    • Endometrial cancer Neg Hx    • Ovarian cancer Neg Hx    • BRCA 1/2 Neg Hx    • BRCA1 Negative Neg Hx    • BRCA1 Positive Neg Hx    • BRCA2 Negative Neg Hx    • BRCA2 Positive Neg Hx        Physical Exam:  Vitals:    23 1047   BP: 132/70   BP Location: Left arm   Patient Position: Sitting   Cuff Size: Standard   Pulse: 69   SpO2: 99%   Weight: 71 9 kg (158 lb 9 6 oz)   Height: 5' 2" (1 575 m)     Constitutional: NAD, non toxic  Ears/nose/mouth/throat: atraumatic  CV: regular, bruna, nl S1S2, no murmurs/rubs/gallups, no JVD, no HJR  Resp: ctabl  GI: Soft, NTND  MSK: no swollen joints in exposed areas  Extr: No edema, warm LE  Pysche: Normal affect  Neuro: appropriate in conversation  Skin: dry and intact in exposed areas    Labs & Results:    Lab Results   Component Value Date    WBC 5 16 01/31/2023    HGB 14 3 01/31/2023    HCT 42 4 01/31/2023    MCV 91 01/31/2023     01/31/2023     Lab Results   Component Value Date    SODIUM 141 01/31/2023    K 3 8 01/31/2023     01/31/2023    CO2 28 01/31/2023    BUN 12 01/31/2023    CREATININE 0 76 01/31/2023    GLUC 109 01/31/2023    CALCIUM 9 5 01/31/2023     No results found for: BNP   Lab Results   Component Value Date    CHOLESTEROL 183 01/03/2023    CHOLESTEROL 210 (H) 11/04/2021    CHOLESTEROL 171 11/19/2020     Lab Results   Component Value Date    HDL 64 01/03/2023    HDL 78 11/04/2021    HDL 85 11/19/2020     Lab Results   Component Value Date    TRIG 123 01/03/2023    TRIG 92 11/04/2021    TRIG 67 11/19/2020     Lab Results   Component Value Date    NONHDLC 119 01/03/2023    [place] 132 11/04/2021    [place] 86 11/19/2020       Counseling / Coordination of Care  Time spent today 47 minutes  Greater than 50% of total time was spent with the patient and / or family counseling and / or coordination of care  We discussed diagnoses, most recent studies and any changes in treatment  Thank you for the opportunity to participate in the care of this patient      Tim Infante MD  Attending Physician  Advanced Heart Failure and Transplant Cardiology  [address]

## 2023-02-06 ENCOUNTER — OFFICE VISIT (OUTPATIENT)
Dept: CARDIOLOGY CLINIC | Facility: CLINIC | Age: 73
End: 2023-02-06

## 2023-02-06 VITALS
HEIGHT: 62 IN | WEIGHT: 158.6 LBS | OXYGEN SATURATION: 99 % | DIASTOLIC BLOOD PRESSURE: 70 MMHG | SYSTOLIC BLOOD PRESSURE: 132 MMHG | BODY MASS INDEX: 29.19 KG/M2 | HEART RATE: 69 BPM

## 2023-02-06 DIAGNOSIS — I48.91 ATRIAL FIBRILLATION WITH RVR (HCC): Primary | ICD-10-CM

## 2023-02-06 DIAGNOSIS — I10 BENIGN ESSENTIAL HYPERTENSION: ICD-10-CM

## 2023-02-06 DIAGNOSIS — E78.2 MIXED HYPERLIPIDEMIA: ICD-10-CM

## 2023-02-13 DIAGNOSIS — I48.91 ATRIAL FIBRILLATION WITH RVR (HCC): ICD-10-CM

## 2023-02-14 RX ORDER — METOPROLOL SUCCINATE 25 MG/1
25 TABLET, EXTENDED RELEASE ORAL DAILY
Qty: 90 TABLET | Refills: 0 | Status: SHIPPED | OUTPATIENT
Start: 2023-02-14

## 2023-04-25 DIAGNOSIS — R32 URINARY INCONTINENCE, UNSPECIFIED TYPE: ICD-10-CM

## 2023-04-26 RX ORDER — MIRABEGRON 50 MG/1
50 TABLET, FILM COATED, EXTENDED RELEASE ORAL DAILY
Qty: 90 TABLET | Refills: 1 | Status: SHIPPED | OUTPATIENT
Start: 2023-04-26

## 2023-05-16 DIAGNOSIS — I48.91 ATRIAL FIBRILLATION WITH RVR (HCC): ICD-10-CM

## 2023-05-16 RX ORDER — METOPROLOL SUCCINATE 25 MG/1
25 TABLET, EXTENDED RELEASE ORAL DAILY
Qty: 90 TABLET | Refills: 0 | Status: SHIPPED | OUTPATIENT
Start: 2023-05-16

## 2023-07-03 DIAGNOSIS — I48.91 ATRIAL FIBRILLATION WITH RVR (HCC): Primary | ICD-10-CM

## 2023-07-07 ENCOUNTER — HOSPITAL ENCOUNTER (OUTPATIENT)
Dept: MAMMOGRAPHY | Facility: CLINIC | Age: 73
Discharge: HOME | End: 2023-07-07
Payer: COMMERCIAL

## 2023-07-07 DIAGNOSIS — R92.8 ABNORMAL MAMMOGRAM: ICD-10-CM

## 2023-07-07 PROCEDURE — G0279 TOMOSYNTHESIS, MAMMO: HCPCS

## 2023-07-07 PROCEDURE — 77066 DX MAMMO INCL CAD BI: CPT

## 2023-07-25 DIAGNOSIS — R32 URINARY INCONTINENCE, UNSPECIFIED TYPE: ICD-10-CM

## 2023-07-26 RX ORDER — MIRABEGRON 50 MG/1
50 TABLET, FILM COATED, EXTENDED RELEASE ORAL DAILY
Qty: 90 TABLET | Refills: 0 | Status: SHIPPED | OUTPATIENT
Start: 2023-07-26

## 2023-08-03 ENCOUNTER — HOSPITAL ENCOUNTER (OUTPATIENT)
Dept: NON INVASIVE DIAGNOSTICS | Facility: CLINIC | Age: 73
Discharge: HOME | End: 2023-08-03
Payer: COMMERCIAL

## 2023-08-03 DIAGNOSIS — I48.91 ATRIAL FIBRILLATION WITH RVR (HCC): ICD-10-CM

## 2023-08-03 PROCEDURE — 93226 XTRNL ECG REC<48 HR SCAN A/R: CPT

## 2023-08-03 PROCEDURE — 93225 XTRNL ECG REC<48 HRS REC: CPT

## 2023-08-09 PROCEDURE — 93227 XTRNL ECG REC<48 HR R&I: CPT | Performed by: INTERNAL MEDICINE

## 2023-08-10 ENCOUNTER — TELEPHONE (OUTPATIENT)
Dept: CARDIOLOGY CLINIC | Facility: CLINIC | Age: 73
End: 2023-08-10

## 2023-08-10 NOTE — RESULT ENCOUNTER NOTE
Please notify pt to increase her toprol xl 25 daily to 50 daily - this may help w her Afib burden noted on her Holter. Holter was otherwise reassuring.       Thanks!    - Wardell Shone

## 2023-08-10 NOTE — TELEPHONE ENCOUNTER
Called patient and left message  pt to increase her toprol xl 25 daily to 50 daily - this may help w her Afib burden noted on her Holter. Holter was otherwise reassuring.        ----- Message from Yoon Rice MD sent at 8/10/2023 12:27 PM EDT -----  Please notify pt to increase her toprol xl 25 daily to 50 daily - this may help w her Afib burden noted on her Holter. Holter was otherwise reassuring.       Thanks!    - Mercedes Connolly

## 2023-08-14 ENCOUNTER — TELEPHONE (OUTPATIENT)
Dept: CARDIOLOGY CLINIC | Facility: CLINIC | Age: 73
End: 2023-08-14

## 2023-08-14 NOTE — TELEPHONE ENCOUNTER
P/C and wanted to speak about message she received from [place] about medication and side effects    Called Amairani back and she advise that feels there is miscommunication. Pt had been taking metoprolol 12.5 mg from 6/21-8/9. Pt states during that time the side effects of exhaustion,lightheadness and cough had all resolved. But she continued to have SOB.     8/9/2023 to 8/14/2023 had completely stopped the metoprolol. The SOB had resolved and noticed was not having that any longer. Pt feels she needs to have a different medication. She is very sensitive to medications.    BP's 125/74 HR 78    Please advise

## 2023-08-15 DIAGNOSIS — I48.91 ATRIAL FIBRILLATION WITH RVR (HCC): Primary | ICD-10-CM

## 2023-08-15 DIAGNOSIS — I10 ESSENTIAL HYPERTENSION: ICD-10-CM

## 2023-08-15 RX ORDER — HYDROCHLOROTHIAZIDE 12.5 MG/1
25 CAPSULE, GELATIN COATED ORAL EVERY MORNING
Qty: 180 CAPSULE | Refills: 5 | Status: SHIPPED | OUTPATIENT
Start: 2023-08-15 | End: 2025-02-05

## 2023-08-15 RX ORDER — DILTIAZEM HYDROCHLORIDE 120 MG/1
120 CAPSULE, EXTENDED RELEASE ORAL DAILY
Qty: 90 CAPSULE | Refills: 5 | Status: SHIPPED | OUTPATIENT
Start: 2023-08-15 | End: 2025-02-05

## 2023-08-15 NOTE — TELEPHONE ENCOUNTER
Called and spoke to AU and went over Dr Steve Ramirez message about  Medications and information, verbally understood advised sent to pharmacy

## 2023-09-08 NOTE — PROGRESS NOTES
Outpatient Advanced Heart Failure/Pulmonary Hypertension Consult Note - Laxmi Beach 68 y.o. 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 ED notes:  "Pt was at [facility] surgery center for knee replacement, in pre-op pt was in afib with RVR; pt denies symptoms; pt returned to NSR on own; pt states she has hx of tachycardia and has seen cardiology with no findings"  AF on [facility]  8/2023 Holter:  IMPRESSION:  1. Predominantly sinus rhythm throughout. Episodes of atrial fibrillation with rapid ventricular response total burden 8%. These seem to correlate with symptoms of shortness of breath.   Rare PACs and PVCs  HTN  HLD  Had 1/25/23 echo for preop eval per pcp - was wnl  Prior cardiac evaluations for preop 2015 and also for palpitations in remote past, was put on ASA 81 daily years ago for palpitations over concern for AF, but was never able to document AF      Lab Results   Component Value Date    CREATININE 0.72 09/12/2023     Lab Results   Component Value Date    K 3.8 09/12/2023     No results found for: "HGBA1C"  Lab Results   Component Value Date    TEO9CNAMZENS 1.710 01/31/2023     Lab Results   Component Value Date    [address] 94 01/03/2023     No results found for: "BNP"   No results found for: "NTBNP"          TODAY's PLAN:  Warm, euvolemic  No new cardiac complaints, feels well  Did not tolerate bblocker but has been tolerating dilt well  BP top nl home log with many good numbers; higher in office    Pre op evaluation:  - the patient is low-moderate risk for non-cardiac surgery  - the patient likely can complete 4 mets exertion without issue  - no evidence of acute cardiac issue including decompensated HF, uncontrolled arrhythmia, severe valvulopathy, or an ACS  - the patient is currently optimized from cardiac perspective  - no further cardiac testing planned at this time  - continue current home medications with any nishant-op modifications per anesthesia/surgery teams  [facility]IL[facility] plan: per surgery team, can hold [facility] for up to 3 days if needed. Restart asa soon as able    cw dilt 120 qd  Watch bruna, asymptomatic    Fu BP trend with PCP after surgery    Key info from my prior notes:    chadsvasc 3 - warrants AC  Discussed risks and benefits and pt elects to start eliquis 5 bid today  DC ASA  No significant bleed Hx per pt    Advised her to discuss reduction/switch from mirabegron if able as this may contribute to HTN and tachycardia; risks/beneits discussion as she has had significant urinary Sx in past    Consider sleep study with PCP    Studies:    EKG - my read:  1/31/23: NSR, nl axis, no acute ischemic STT changes, LAE, iRBBB  2/6/23: SB to HR 50, nl axis, no acute ischemic STT changes, iRBBB    1/25/23 TTE:  •  Left Ventricle: Left ventricular cavity size is normal. Wall thickness is normal. The left ventricular ejection fraction is 60%. Systolic function is normal. Wall motion is normal. Diastolic function is normal for age. •  Right Ventricle: Right ventricular cavity size is normal. Systolic function is normal.      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 Continues feeling well, no recent Sx aside from intermittant palpitations x years. No significant bleeding Hx. No new CP/SOB/dizziness/palpitations/syncope. No new leg swelling, PND, pillow orthopnea, unintentional weight changes, fatigue. No toxic habits. No daytime sleepiness or snoring. Interval Hx:    As noted in 'plan' section above and prior epic chart notes. No new CP/SOB/dizziness/palpitations/syncope. No new fatigue. No new unintentional weight changes. No new leg swelling, PND, pillow orthopnea. No new fevers, chills, cough, nausea, vomiting, diarrhea, dysuria.         Past Medical History:   Diagnosis Date   • Arthritis 05/2021    R knee replacement scheduled  01/23   • Cataract bilateral    • Hyperlipidemia    • Hypertension    • Osteopenia    • Urinary incontinence      Patient Active Problem List   Diagnosis   • Benign essential hypertension   • Hyperlipidemia   • Osteoporosis   • Chronic pain of right knee   • Atrial fibrillation (HCC)   • Osteoarthritis of right knee       ROS:  10 point ROS negative except as specified in HPI    Allergies   Allergen Reactions   • Ibandronic Acid Other (See Comments)     Joint pain       Current Outpatient Medications   Medication Instructions   • apixaban (ELIQUIS) 5 mg, Oral, 2 times daily   • clobetasol (TEMOVATE) 0.05 % ointment Topical, 2 times daily   • diltiazem (DILACOR XR) 120 mg, Oral, Daily   • hydrochlorothiazide (MICROZIDE) 25 mg, Oral, Every morning   • Myrbetriq 50 mg, Oral, Daily   • pravastatin (PRAVACHOL) 20 mg tablet TAKE 1 TABLET DAILY        Social History     Socioeconomic History   • Marital status:      Spouse name: Volodymyr Hill    • Number of children: 3   • Years of education: Not on file   • Highest education level: Not on file   Occupational History   • Occupation: Retired    Tobacco Use   • Smoking status: Former     Packs/day: 0.25     Years: 10.00     Total pack years: 2.50     Types: Cigarettes     Quit date: 1985     Years since quittin.8   • Smokeless tobacco: Never   Vaping Use   • Vaping Use: Never used   Substance and Sexual Activity   • Alcohol use:  Yes   • Drug use: Never   • Sexual activity: Not Currently     Birth control/protection: Post-menopausal   Other Topics Concern   • Not on file   Social History Narrative    3 children, Melissa Loomis        RN     Social Determinants of Health     Financial Resource Strain: Low Risk  (2023)    Overall Financial Resource Strain (CARDIA)    • Difficulty of Paying Living Expenses: Not hard at all   Food Insecurity: No Food Insecurity (2023)    Hunger Vital Sign    • Worried About Running Out of Food in the Last Year: Never true • Ran Out of Food in the Last Year: Never true   Transportation Needs: No Transportation Needs (9/12/2023)    PRAPARE - Transportation    • Lack of Transportation (Medical): No    • Lack of Transportation (Non-Medical):  No   Physical Activity: Not on file   Stress: Not on file   Social Connections: Not on file   Intimate Partner Violence: Not on file   Housing Stability: Unknown (9/12/2023)    Housing Stability Vital Sign    • Unable to Pay for Housing in the Last Year: No    • Number of Places Lived in the Last Year: 1    • Unstable Housing in the Last Year: Not on file       Family History   Problem Relation Age of Onset   • Hypertension Mother    • No Known Problems Father    • Diabetes Maternal Grandmother    • No Known Problems Maternal Grandfather    • No Known Problems Paternal Grandmother    • No Known Problems Paternal Grandfather    • No Known Problems Sister    • No Known Problems Daughter    • No Known Problems Son    • No Known Problems Daughter    • No Known Problems Son    • No Known Problems Maternal Aunt    • No Known Problems Paternal Aunt    • Breast cancer Neg Hx    • Breast cancer additional onset Neg Hx    • Colon cancer Neg Hx    • Endometrial cancer Neg Hx    • Ovarian cancer Neg Hx    • BRCA 1/2 Neg Hx    • BRCA1 Negative Neg Hx    • BRCA1 Positive Neg Hx    • BRCA2 Negative Neg Hx    • BRCA2 Positive Neg Hx        Physical Exam:  Vitals:    09/18/23 1101   BP: 156/78   BP Location: Left arm   Patient Position: Sitting   Cuff Size: Standard   Pulse: 56   SpO2: 98%   Weight: 70.8 kg (156 lb)   Height: 5' 2" (1.575 m)     Constitutional: NAD, non toxic  Ears/nose/mouth/throat: atraumatic  CV: regular, bruna, nl S1S2, no murmurs/rubs/gallups, no JVD, no HJR  Resp: ctabl  GI: Soft, NTND  MSK: no swollen joints in exposed areas  Extr: No edema, warm LE  Pysche: Normal affect  Neuro: appropriate in conversation  Skin: dry and intact in exposed areas    Labs & Results:    Lab Results   Component Value Date    WBC 6.72 09/12/2023    HGB 13.2 09/12/2023    HCT 39.1 09/12/2023    MCV 91 09/12/2023     09/12/2023     Lab Results   Component Value Date    SODIUM 135 09/12/2023    K 3.8 09/12/2023     09/12/2023    CO2 29 09/12/2023    BUN 9 09/12/2023    CREATININE 0.72 09/12/2023    GLUC 109 01/31/2023    CALCIUM 9.5 09/12/2023     No results found for: "BNP"   Lab Results   Component Value Date    CHOLESTEROL 183 01/03/2023    CHOLESTEROL 210 (H) 11/04/2021    CHOLESTEROL 171 11/19/2020     Lab Results   Component Value Date    HDL 64 01/03/2023    HDL 78 11/04/2021    HDL 85 11/19/2020     Lab Results   Component Value Date    TRIG 123 01/03/2023    TRIG 92 11/04/2021    TRIG 67 11/19/2020     Lab Results   Component Value Date    NONHDLC 119 01/03/2023    [address] 132 11/04/2021    [address] 86 11/19/2020       Counseling / Coordination of Care  Time spent today 27 minutes. Greater than 50% of total time was spent with the patient and / or family counseling and / or coordination of care. We discussed diagnoses, most recent studies and any changes in treatment. Thank you for the opportunity to participate in the care of this patient.     Philomena Nageotte, MD  Attending Physician  Advanced Heart Failure and Transplant Cardiology  [address] [facility]

## 2023-09-12 ENCOUNTER — OFFICE VISIT (OUTPATIENT)
Dept: FAMILY MEDICINE CLINIC | Facility: CLINIC | Age: 73
End: 2023-09-12
Payer: COMMERCIAL

## 2023-09-12 ENCOUNTER — APPOINTMENT (OUTPATIENT)
Dept: LAB | Facility: CLINIC | Age: 73
End: 2023-09-12
Payer: COMMERCIAL

## 2023-09-12 ENCOUNTER — TELEPHONE (OUTPATIENT)
Dept: FAMILY MEDICINE CLINIC | Facility: CLINIC | Age: 73
End: 2023-09-12

## 2023-09-12 VITALS
RESPIRATION RATE: 18 BRPM | TEMPERATURE: 98.2 F | SYSTOLIC BLOOD PRESSURE: 164 MMHG | HEART RATE: 72 BPM | BODY MASS INDEX: 29 KG/M2 | OXYGEN SATURATION: 97 % | HEIGHT: 62 IN | WEIGHT: 157.6 LBS | DIASTOLIC BLOOD PRESSURE: 92 MMHG

## 2023-09-12 DIAGNOSIS — Z01.812 PRE-OPERATIVE LABORATORY EXAMINATION: ICD-10-CM

## 2023-09-12 DIAGNOSIS — E78.2 MIXED HYPERLIPIDEMIA: ICD-10-CM

## 2023-09-12 DIAGNOSIS — Z01.818 OTHER SPECIFIED PRE-OPERATIVE EXAMINATION: ICD-10-CM

## 2023-09-12 DIAGNOSIS — Z01.818 PREOPERATIVE CLEARANCE: Primary | ICD-10-CM

## 2023-09-12 DIAGNOSIS — M17.11 OSTEOARTHRITIS OF RIGHT KNEE, UNSPECIFIED OSTEOARTHRITIS TYPE: ICD-10-CM

## 2023-09-12 DIAGNOSIS — I48.0 PAROXYSMAL ATRIAL FIBRILLATION (HCC): ICD-10-CM

## 2023-09-12 DIAGNOSIS — I10 BENIGN ESSENTIAL HYPERTENSION: ICD-10-CM

## 2023-09-12 PROBLEM — I48.91 ATRIAL FIBRILLATION (HCC): Status: ACTIVE | Noted: 2023-09-11

## 2023-09-12 LAB
ALBUMIN SERPL BCP-MCNC: 4.3 G/DL (ref 3.5–5)
ALP SERPL-CCNC: 63 U/L (ref 34–104)
ALT SERPL W P-5'-P-CCNC: 12 U/L (ref 7–52)
ANION GAP SERPL CALCULATED.3IONS-SCNC: 6 MMOL/L
AST SERPL W P-5'-P-CCNC: 15 U/L (ref 13–39)
BASOPHILS # BLD AUTO: 0.03 THOUSANDS/ÂΜL (ref 0–0.1)
BASOPHILS NFR BLD AUTO: 0 % (ref 0–1)
BILIRUB SERPL-MCNC: 0.58 MG/DL (ref 0.2–1)
BILIRUB UR QL STRIP: NEGATIVE
BUN SERPL-MCNC: 9 MG/DL (ref 5–25)
CALCIUM SERPL-MCNC: 9.5 MG/DL (ref 8.4–10.2)
CHLORIDE SERPL-SCNC: 100 MMOL/L (ref 96–108)
CLARITY UR: CLEAR
CO2 SERPL-SCNC: 29 MMOL/L (ref 21–32)
COLOR UR: NORMAL
CREAT SERPL-MCNC: 0.72 MG/DL (ref 0.6–1.3)
EOSINOPHIL # BLD AUTO: 0.16 THOUSAND/ÂΜL (ref 0–0.61)
EOSINOPHIL NFR BLD AUTO: 2 % (ref 0–6)
ERYTHROCYTE [DISTWIDTH] IN BLOOD BY AUTOMATED COUNT: 11.7 % (ref 11.6–15.1)
GFR SERPL CREATININE-BSD FRML MDRD: 83 ML/MIN/1.73SQ M
GLUCOSE P FAST SERPL-MCNC: 90 MG/DL (ref 65–99)
GLUCOSE UR STRIP-MCNC: NEGATIVE MG/DL
HCT VFR BLD AUTO: 39.1 % (ref 34.8–46.1)
HGB BLD-MCNC: 13.2 G/DL (ref 11.5–15.4)
HGB UR QL STRIP.AUTO: NEGATIVE
IMM GRANULOCYTES # BLD AUTO: 0.02 THOUSAND/UL (ref 0–0.2)
IMM GRANULOCYTES NFR BLD AUTO: 0 % (ref 0–2)
KETONES UR STRIP-MCNC: NEGATIVE MG/DL
LEUKOCYTE ESTERASE UR QL STRIP: NEGATIVE
LYMPHOCYTES # BLD AUTO: 1.35 THOUSANDS/ÂΜL (ref 0.6–4.47)
LYMPHOCYTES NFR BLD AUTO: 20 % (ref 14–44)
MCH RBC QN AUTO: 30.6 PG (ref 26.8–34.3)
MCHC RBC AUTO-ENTMCNC: 33.8 G/DL (ref 31.4–37.4)
MCV RBC AUTO: 91 FL (ref 82–98)
MONOCYTES # BLD AUTO: 0.85 THOUSAND/ÂΜL (ref 0.17–1.22)
MONOCYTES NFR BLD AUTO: 13 % (ref 4–12)
NEUTROPHILS # BLD AUTO: 4.31 THOUSANDS/ÂΜL (ref 1.85–7.62)
NEUTS SEG NFR BLD AUTO: 65 % (ref 43–75)
NITRITE UR QL STRIP: NEGATIVE
NRBC BLD AUTO-RTO: 0 /100 WBCS
PH UR STRIP.AUTO: 7 [PH]
PLATELET # BLD AUTO: 259 THOUSANDS/UL (ref 149–390)
PMV BLD AUTO: 9.4 FL (ref 8.9–12.7)
POTASSIUM SERPL-SCNC: 3.8 MMOL/L (ref 3.5–5.3)
PROT SERPL-MCNC: 6.9 G/DL (ref 6.4–8.4)
PROT UR STRIP-MCNC: NEGATIVE MG/DL
RBC # BLD AUTO: 4.31 MILLION/UL (ref 3.81–5.12)
SODIUM SERPL-SCNC: 135 MMOL/L (ref 135–147)
SP GR UR STRIP.AUTO: 1.01 (ref 1–1.03)
UROBILINOGEN UR STRIP-ACNC: <2 MG/DL
WBC # BLD AUTO: 6.72 THOUSAND/UL (ref 4.31–10.16)

## 2023-09-12 PROCEDURE — 80053 COMPREHEN METABOLIC PANEL: CPT

## 2023-09-12 PROCEDURE — 99214 OFFICE O/P EST MOD 30 MIN: CPT | Performed by: FAMILY MEDICINE

## 2023-09-12 PROCEDURE — 81003 URINALYSIS AUTO W/O SCOPE: CPT

## 2023-09-12 PROCEDURE — 85025 COMPLETE CBC W/AUTO DIFF WBC: CPT

## 2023-09-12 PROCEDURE — 36415 COLL VENOUS BLD VENIPUNCTURE: CPT

## 2023-09-12 NOTE — PROGRESS NOTES
Name: Finesse Fajardo      : 1950      MRN: [id number]  Encounter Provider: Wally Ayala DO  Encounter Date: 2023   Encounter department: [address]     1. Preoperative clearance  Comments:  cardiology clearance per cardiology  from my standpoint, clear for surgery as long as cards clears pt      2. Benign essential hypertension  Comments:  not at goal today, but extenuating circumstances and she checks at home where is it appropriate  continue current meds    3. Mixed hyperlipidemia  Comments:  tolerating pravachol   continue same    4. Paroxysmal atrial fibrillation (HCC)  Comments:  f/u with cardiology as planned    5. Osteoarthritis of right knee, unspecified osteoarthritis type  Comments:  mgmt per surgery           Subjective      HPI   Pt presents for preop eval for upcoming R TKR with Dr. Christina Charlton. CBC, CMP, UA nml.  ekg hasn't been done yet. Seeing cards next week for afib/clearance. She has failed shots, meds, PT, and now is ready for surgery     Pt feels well in general.  No chest pain, shortness of breath, n/v, abd pain, visual changes, paresthesias, weakness. Can walk up steps without chest pain, shortness of breath. No calf pain. Blood pressure elevated today--bad traffic and is next going to put her dog to sleep. She has cards appt next week and takes blood pressure at home where it runs in the 130/70 range. Review of Systems   Constitutional: Negative for chills, fatigue, fever and unexpected weight change. HENT: Negative for congestion, ear pain, hearing loss, postnasal drip, rhinorrhea, sinus pressure, sinus pain, sore throat, trouble swallowing and voice change. Eyes: Negative for pain, redness and visual disturbance. Respiratory: Negative for cough and shortness of breath. Cardiovascular: Negative for chest pain, palpitations and leg swelling.    Gastrointestinal: Negative for abdominal pain, constipation, diarrhea and nausea. Endocrine: Negative for cold intolerance, heat intolerance, polydipsia and polyuria. Genitourinary: Negative for dysuria, frequency and urgency. Musculoskeletal: Negative for arthralgias, joint swelling and myalgias. Skin: Negative for rash. No suspicious lesions   Neurological: Negative for weakness, numbness and headaches. Hematological: Negative for adenopathy. Current Outpatient Medications on File Prior to Visit   Medication Sig   • apixaban (Eliquis) 5 mg Take 1 tablet (5 mg total) by mouth 2 (two) times a day   • clobetasol (TEMOVATE) 0.05 % ointment Apply topically 2 (two) times a day (Patient taking differently: Apply topically 2 (two) times a week)   • diltiazem (DILACOR XR) 120 MG 24 hr capsule Take 1 capsule (120 mg total) by mouth daily   • hydrochlorothiazide (MICROZIDE) 12.5 mg capsule Take 2 capsules (25 mg total) by mouth every morning   • Mirabegron ER (Myrbetriq) 50 MG TB24 Take 1 tablet (50 mg total) by mouth daily   • pravastatin (PRAVACHOL) 20 mg tablet TAKE 1 TABLET DAILY   • [DISCONTINUED] mupirocin (BACTROBAN) 2 % ointment APPLY TWICE DAILY TO NOSTRIL STARTING 5 DAYS BEFORE SURGERY       Objective     /92 (BP Location: Right arm, Patient Position: Sitting, Cuff Size: Large)   Pulse 72   Temp 98.2 °F (36.8 °C) (Temporal)   Resp 18   Ht 5' 2" (1.575 m)   Wt 71.5 kg (157 lb 9.6 oz)   SpO2 97%   BMI 28.83 kg/m²     Physical Exam  Constitutional:       General: She is not in acute distress. Appearance: She is well-developed. HENT:      Head: Normocephalic and atraumatic. Right Ear: Tympanic membrane, ear canal and external ear normal.      Left Ear: Tympanic membrane, ear canal and external ear normal.      Nose: Nose normal.      Mouth/Throat:      Pharynx: No oropharyngeal exudate. Eyes:      Conjunctiva/sclera: Conjunctivae normal.      Pupils: Pupils are equal, round, and reactive to light. Neck:      Thyroid: No thyromegaly. Vascular: No carotid bruit or JVD. Cardiovascular:      Rate and Rhythm: Regular rhythm. Heart sounds: S1 normal and S2 normal. No murmur heard. No friction rub. No gallop. No S3 or S4 sounds. Pulmonary:      Effort: Pulmonary effort is normal.      Breath sounds: Normal breath sounds. No wheezing, rhonchi or rales. Abdominal:      General: Bowel sounds are normal. There is no distension. Palpations: Abdomen is soft. Tenderness: There is no abdominal tenderness. Lymphadenopathy:      Cervical: No cervical adenopathy. Neurological:      Mental Status: She is alert and oriented to person, place, and time. Cranial Nerves: No cranial nerve deficit. Sensory: No sensory deficit. Deep Tendon Reflexes: Reflexes are normal and symmetric.        Marry Bound, DO

## 2023-09-12 NOTE — TELEPHONE ENCOUNTER
Patient has appoinment with Dr. Ananda Lamar on 09/12/2023 for a pre op clearance. Form has not yet been received by the office. Call placed to [facility], spoke to Dorothy Taveras. Tracie will fax form to the office.

## 2023-09-12 NOTE — TELEPHONE ENCOUNTER
Form has been completed by Dr. José Miguel Pascual and placed in fax folder in clinical area to be scanned into patient's chart and faxed.

## 2023-09-12 NOTE — TELEPHONE ENCOUNTER
Provider: Dr. Precious Manuel    Name of form: [facility] Pre-Operative Clearance    Form given to Dr. Precious Manuel to complete. Form to be faxed to [facility] (fax #: [phone]).

## 2023-09-18 ENCOUNTER — OFFICE VISIT (OUTPATIENT)
Dept: CARDIOLOGY CLINIC | Facility: CLINIC | Age: 73
End: 2023-09-18
Payer: COMMERCIAL

## 2023-09-18 VITALS
BODY MASS INDEX: 28.71 KG/M2 | DIASTOLIC BLOOD PRESSURE: 78 MMHG | WEIGHT: 156 LBS | HEART RATE: 56 BPM | SYSTOLIC BLOOD PRESSURE: 156 MMHG | HEIGHT: 62 IN | OXYGEN SATURATION: 98 %

## 2023-09-18 DIAGNOSIS — Z01.818 PRE-OP EVALUATION: Primary | ICD-10-CM

## 2023-09-18 DIAGNOSIS — I10 ESSENTIAL HYPERTENSION: ICD-10-CM

## 2023-09-18 DIAGNOSIS — E78.2 MIXED HYPERLIPIDEMIA: ICD-10-CM

## 2023-09-18 DIAGNOSIS — I48.91 ATRIAL FIBRILLATION WITH RVR (HCC): ICD-10-CM

## 2023-09-18 PROCEDURE — 93000 ELECTROCARDIOGRAM COMPLETE: CPT | Performed by: STUDENT IN AN ORGANIZED HEALTH CARE EDUCATION/TRAINING PROGRAM

## 2023-09-18 PROCEDURE — 99214 OFFICE O/P EST MOD 30 MIN: CPT | Performed by: STUDENT IN AN ORGANIZED HEALTH CARE EDUCATION/TRAINING PROGRAM

## 2023-10-24 DIAGNOSIS — R32 URINARY INCONTINENCE, UNSPECIFIED TYPE: ICD-10-CM

## 2023-10-24 RX ORDER — MIRABEGRON 50 MG/1
50 TABLET, FILM COATED, EXTENDED RELEASE ORAL DAILY
Qty: 90 TABLET | Refills: 0 | Status: SHIPPED | OUTPATIENT
Start: 2023-10-24

## 2023-11-10 DIAGNOSIS — E78.2 MIXED HYPERLIPIDEMIA: Primary | ICD-10-CM

## 2023-11-10 DIAGNOSIS — E55.9 VITAMIN D DEFICIENCY: ICD-10-CM

## 2023-11-17 ENCOUNTER — APPOINTMENT (OUTPATIENT)
Dept: LAB | Facility: CLINIC | Age: 73
End: 2023-11-17
Payer: COMMERCIAL

## 2023-11-17 DIAGNOSIS — E55.9 VITAMIN D DEFICIENCY: ICD-10-CM

## 2023-11-17 DIAGNOSIS — E78.2 MIXED HYPERLIPIDEMIA: ICD-10-CM

## 2023-11-17 LAB
25(OH)D3 SERPL-MCNC: 34.7 NG/ML (ref 30–100)
CHOLEST SERPL-MCNC: 178 MG/DL
HDLC SERPL-MCNC: 70 MG/DL
LDLC SERPL CALC-MCNC: 86 MG/DL (ref 0–100)
NONHDLC SERPL-MCNC: 108 MG/DL
TRIGL SERPL-MCNC: 109 MG/DL
TSH SERPL DL<=0.05 MIU/L-ACNC: 1.75 UIU/ML (ref 0.45–4.5)

## 2023-11-17 PROCEDURE — 82306 VITAMIN D 25 HYDROXY: CPT

## 2023-11-17 PROCEDURE — 84443 ASSAY THYROID STIM HORMONE: CPT

## 2023-11-17 PROCEDURE — 80061 LIPID PANEL: CPT

## 2023-11-17 PROCEDURE — 36415 COLL VENOUS BLD VENIPUNCTURE: CPT

## 2023-11-29 ENCOUNTER — OFFICE VISIT (OUTPATIENT)
Dept: FAMILY MEDICINE CLINIC | Facility: CLINIC | Age: 73
End: 2023-11-29
Payer: COMMERCIAL

## 2023-11-29 VITALS
HEART RATE: 68 BPM | HEIGHT: 62 IN | WEIGHT: 153 LBS | SYSTOLIC BLOOD PRESSURE: 124 MMHG | OXYGEN SATURATION: 99 % | RESPIRATION RATE: 16 BRPM | DIASTOLIC BLOOD PRESSURE: 80 MMHG | BODY MASS INDEX: 28.16 KG/M2 | TEMPERATURE: 97.5 F

## 2023-11-29 DIAGNOSIS — Z00.00 MEDICARE ANNUAL WELLNESS VISIT, SUBSEQUENT: Primary | ICD-10-CM

## 2023-11-29 DIAGNOSIS — Z12.11 SCREENING FOR MALIGNANT NEOPLASM OF COLON: ICD-10-CM

## 2023-11-29 DIAGNOSIS — I10 BENIGN ESSENTIAL HYPERTENSION: ICD-10-CM

## 2023-11-29 DIAGNOSIS — R42 LIGHTHEADEDNESS: ICD-10-CM

## 2023-11-29 DIAGNOSIS — E78.2 MIXED HYPERLIPIDEMIA: ICD-10-CM

## 2023-11-29 DIAGNOSIS — I48.91 ATRIAL FIBRILLATION, UNSPECIFIED TYPE (HCC): ICD-10-CM

## 2023-11-29 PROCEDURE — 99214 OFFICE O/P EST MOD 30 MIN: CPT | Performed by: FAMILY MEDICINE

## 2023-11-29 PROCEDURE — G0439 PPPS, SUBSEQ VISIT: HCPCS | Performed by: FAMILY MEDICINE

## 2023-11-29 NOTE — PATIENT INSTRUCTIONS
Medicare Preventive Visit Patient Instructions  Thank you for completing your Welcome to Medicare Visit or Medicare Annual Wellness Visit today. Your next wellness visit will be due in one year (11/29/2024). The screening/preventive services that you may require over the next 5-10 years are detailed below. Some tests may not apply to you based off risk factors and/or age. Screening tests ordered at today's visit but not completed yet may show as past due. Also, please note that scanned in results may not display below. Preventive Screenings:  Service Recommendations Previous Testing/Comments   Colorectal Cancer Screening  * Colonoscopy    * Fecal Occult Blood Test (FOBT)/Fecal Immunochemical Test (FIT)  * Fecal DNA/Cologuard Test  * Flexible Sigmoidoscopy Age: 43-73 years old   Colonoscopy: every 10 years (may be performed more frequently if at higher risk)  OR  FOBT/FIT: every 1 year  OR  Cologuard: every 3 years  OR  Sigmoidoscopy: every 5 years  Screening may be recommended earlier than age 39 if at higher risk for colorectal cancer. Also, an individualized decision between you and your healthcare provider will decide whether screening between the ages of 77-80 would be appropriate. Colonoscopy: 02/03/2021  FOBT/FIT: Not on file  Cologuard: 02/03/2021  Sigmoidoscopy: Not on file    Screening Current     Breast Cancer Screening Age: 36 years old  Frequency: every 1-2 years  Not required if history of left and right mastectomy Mammogram: 07/07/2023    Screening Current   Cervical Cancer Screening Between the ages of 21-29, pap smear recommended once every 3 years. Between the ages of 32-69, can perform pap smear with HPV co-testing every 5 years.    Recommendations may differ for women with a history of total hysterectomy, cervical cancer, or abnormal pap smears in past. Pap Smear: Not on file    Screening Not Indicated   Hepatitis C Screening Once for adults born between 1945 and 1965  More frequently in patients at high risk for Hepatitis C Hep C Antibody: 09/12/2019    Screening Current   Diabetes Screening 1-2 times per year if you're at risk for diabetes or have pre-diabetes Fasting glucose: 90 mg/dL (9/12/2023)  A1C: No results in last 5 years (No results in last 5 years)  Screening Current   Cholesterol Screening Once every 5 years if you don't have a lipid disorder. May order more often based on risk factors. Lipid panel: 11/17/2023    Screening Not Indicated  History Lipid Disorder     Other Preventive Screenings Covered by Medicare:  Abdominal Aortic Aneurysm (AAA) Screening: covered once if your at risk. You're considered to be at risk if you have a family history of AAA. Lung Cancer Screening: covers low dose CT scan once per year if you meet all of the following conditions: (1) Age 48-67; (2) No signs or symptoms of lung cancer; (3) Current smoker or have quit smoking within the last 15 years; (4) You have a tobacco smoking history of at least 20 pack years (packs per day multiplied by number of years you smoked); (5) You get a written order from a healthcare provider. Glaucoma Screening: covered annually if you're considered high risk: (1) You have diabetes OR (2) Family history of glaucoma OR (3)  aged 48 and older OR (3)  American aged 72 and older  Osteoporosis Screening: covered every 2 years if you meet one of the following conditions: (1) You're estrogen deficient and at risk for osteoporosis based off medical history and other findings; (2) Have a vertebral abnormality; (3) On glucocorticoid therapy for more than 3 months; (4) Have primary hyperparathyroidism; (5) On osteoporosis medications and need to assess response to drug therapy. Last bone density test (DXA Scan): Not on file. HIV Screening: covered annually if you're between the age of 14-79. Also covered annually if you are younger than 13 and older than 72 with risk factors for HIV infection.  For pregnant patients, it is covered up to 3 times per pregnancy. Immunizations:  Immunization Recommendations   Influenza Vaccine Annual influenza vaccination during flu season is recommended for all persons aged >= 6 months who do not have contraindications   Pneumococcal Vaccine   * Pneumococcal conjugate vaccine = PCV13 (Prevnar 13), PCV15 (Vaxneuvance), PCV20 (Prevnar 20)  * Pneumococcal polysaccharide vaccine = PPSV23 (Pneumovax) Adults 53-42 yo with certain risk factors or if 69+ yo  If never received any pneumonia vaccine: recommend Prevnar 20 (PCV20)  Give PCV20 if previously received 1 dose of PCV13 or PPSV23   Hepatitis B Vaccine 3 dose series if at intermediate or high risk (ex: diabetes, end stage renal disease, liver disease)   Respiratory syncytial virus (RSV) Vaccine - COVERED BY MEDICARE PART D  * RSVPreF3 (Arexvy) CDC recommends that adults 61years of age and older may receive a single dose of RSV vaccine using shared clinical decision-making (SCDM)   Tetanus (Td) Vaccine - COST NOT COVERED BY MEDICARE PART B Following completion of primary series, a booster dose should be given every 10 years to maintain immunity against tetanus. Td may also be given as tetanus wound prophylaxis. Tdap Vaccine - COST NOT COVERED BY MEDICARE PART B Recommended at least once for all adults. For pregnant patients, recommended with each pregnancy. Shingles Vaccine (Shingrix) - COST NOT COVERED BY MEDICARE PART B  2 shot series recommended in those 19 years and older who have or will have weakened immune systems or those 50 years and older     Health Maintenance Due:      Topic Date Due    Colorectal Cancer Screening  02/03/2024    Breast Cancer Screening: Mammogram  07/07/2024    Hepatitis C Screening  Completed     Immunizations Due:      Topic Date Due    COVID-19 Vaccine (5 - Pfizer series) 04/05/2023     Advance Directives   What are advance directives?   Advance directives are legal documents that state your wishes and plans for medical care. These plans are made ahead of time in case you lose your ability to make decisions for yourself. Advance directives can apply to any medical decision, such as the treatments you want, and if you want to donate organs. What are the types of advance directives? There are many types of advance directives, and each state has rules about how to use them. You may choose a combination of any of the following:  Living will: This is a written record of the treatment you want. You can also choose which treatments you do not want, which to limit, and which to stop at a certain time. This includes surgery, medicine, IV fluid, and tube feedings. Durable power of  for healthcare [facility] [place]): This is a written record that states who you want to make healthcare choices for you when you are unable to make them for yourself. This person, called a proxy, is usually a family member or a friend. You may choose more than 1 proxy. Do not resuscitate (DNR) order:  A DNR order is used in case your heart stops beating or you stop breathing. It is a request not to have certain forms of treatment, such as CPR. A DNR order may be included in other types of advance directives. Medical directive: This covers the care that you want if you are in a coma, near death, or unable to make decisions for yourself. You can list the treatments you want for each condition. Treatment may include pain medicine, surgery, blood transfusions, dialysis, IV or tube feedings, and a ventilator (breathing machine). Values history: This document has questions about your views, beliefs, and how you feel and think about life. This information can help others choose the care that you would choose. Why are advance directives important? An advance directive helps you control your care. Although spoken wishes may be used, it is better to have your wishes written down. Spoken wishes can be misunderstood, or not followed.  Treatments may be given even if you do not want them. An advance directive may make it easier for your family to make difficult choices about your care. Urinary Incontinence   Urinary incontinence (UI)  is when you lose control of your bladder. UI develops because your bladder cannot store or empty urine properly. The 3 most common types of UI are stress incontinence, urge incontinence, or both. Medicines:   May be given to help strengthen your bladder control. Report any side effects of medication to your healthcare provider. Do pelvic muscle exercises often:  Your pelvic muscles help you stop urinating. Squeeze these muscles tight for 5 seconds, then relax for 5 seconds. Gradually work up to squeezing for 10 seconds. Do 3 sets of 15 repetitions a day, or as directed. This will help strengthen your pelvic muscles and improve bladder control. Train your bladder:  Go to the bathroom at set times, such as every 2 hours, even if you do not feel the urge to go. You can also try to hold your urine when you feel the urge to go. For example, hold your urine for 5 minutes when you feel the urge to go. As that becomes easier, hold your urine for 10 minutes. Self-care:   Keep a UI record. Write down how often you leak urine and how much you leak. Make a note of what you were doing when you leaked urine. Drink liquids as directed. You may need to limit the amount of liquid you drink to help control your urine leakage. Do not drink any liquid right before you go to bed. Limit or do not have drinks that contain caffeine or alcohol. Prevent constipation. Eat a variety of high-fiber foods. Good examples are high-fiber cereals, beans, vegetables, and whole-grain breads. Walking is the best way to trigger your intestines to have a bowel movement. Exercise regularly and maintain a healthy weight. Weight loss and exercise will decrease pressure on your bladder and help you control your leakage.    Use a catheter as directed  to help empty your bladder. A catheter is a tiny, plastic tube that is put into your bladder to drain your urine. Go to behavior therapy as directed. Behavior therapy may be used to help you learn to control your urge to urinate. Weight Management   Why it is important to manage your weight:  Being overweight increases your risk of health conditions such as heart disease, high blood pressure, type 2 diabetes, and certain types of cancer. It can also increase your risk for osteoarthritis, sleep apnea, and other respiratory problems. Aim for a slow, steady weight loss. Even a small amount of weight loss can lower your risk of health problems. How to lose weight safely:  A safe and healthy way to lose weight is to eat fewer calories and get regular exercise. You can lose up about 1 pound a week by decreasing the number of calories you eat by 500 calories each day. Healthy meal plan for weight management:  A healthy meal plan includes a variety of foods, contains fewer calories, and helps you stay healthy. A healthy meal plan includes the following:  Eat whole-grain foods more often. A healthy meal plan should contain fiber. Fiber is the part of grains, fruits, and vegetables that is not broken down by your body. Whole-grain foods are healthy and provide extra fiber in your diet. Some examples of whole-grain foods are whole-wheat breads and pastas, oatmeal, brown rice, and bulgur. Eat a variety of vegetables every day. Include dark, leafy greens such as spinach, kale, michael greens, and mustard greens. Eat yellow and orange vegetables such as carrots, sweet potatoes, and winter squash. Eat a variety of fruits every day. Choose fresh or canned fruit (canned in its own juice or light syrup) instead of juice. Fruit juice has very little or no fiber. Eat low-fat dairy foods. Drink fat-free (skim) milk or 1% milk. Eat fat-free yogurt and low-fat cottage cheese.  Try low-fat cheeses such as mozzarella and other reduced-fat cheeses. Choose meat and other protein foods that are low in fat. Choose beans or other legumes such as split peas or lentils. Choose fish, skinless poultry (chicken or turkey), or lean cuts of red meat (beef or pork). Before you cook meat or poultry, cut off any visible fat. Use less fat and oil. Try baking foods instead of frying them. Add less fat, such as margarine, sour cream, regular salad dressing and mayonnaise to foods. Eat fewer high-fat foods. Some examples of high-fat foods include french fries, doughnuts, ice cream, and cakes. Eat fewer sweets. Limit foods and drinks that are high in sugar. This includes candy, cookies, regular soda, and sweetened drinks. Exercise:  Exercise at least 30 minutes per day on most days of the week. Some examples of exercise include walking, biking, dancing, and swimming. You can also fit in more physical activity by taking the stairs instead of the elevator or parking farther away from stores. Ask your healthcare provider about the best exercise plan for you. © Copyright [employer] 2018 Information is for End User's use only and may not be sold, redistributed or otherwise used for commercial purposes.  All illustrations and images included in CareNotes® are the copyrighted property of [employer] or [address] [place] [address] appt with cardiology--I think you're experiencing symptomatic bradycardia  Cologuard in February/march  Obtain cbc

## 2023-11-29 NOTE — PROGRESS NOTES
Assessment and Plan:     Problem List Items Addressed This Visit       Benign essential hypertension  Bps in 130s at home and appropriate today  Could get tighter control but concern for possible changes to calcium channel blocker      Hyperlipidemia  At goal  Continue pravachol  No hx CAD      Atrial fibrillation (HCC)  sinus, but pt on lowest dose of diltiazem and is still often bradycardic at home  needs f/u with cards     Relevant Orders    Home Study     Other Visit Diagnoses       Medicare annual wellness visit, subsequent    -  Primary    declines flu shot and dexa  cologuard in the new year  otherwise up to date preventively    Screening for malignant neoplasm of colon        Relevant Orders    Cologuard    Lightheadedness        concern for symptomatic bradycardia--home numbers range in 30-90  needs f/u with cardiology  Push fluids    Relevant Orders    CBC and differential             Preventive health issues were discussed with patient, and age appropriate screening tests were ordered as noted in patient's After Visit Summary. Personalized health advice and appropriate referrals for health education or preventive services given if needed, as noted in patient's After Visit Summary. History of Present Illness:     Patient presents for a Medicare Wellness Visit    HPI   Pt presents for MAW and f/u. Preventively, pt declines flu shot and dexa. Due for cologuard in the new year. Exercising with PT. Seeing dentist.  Recent labs reviewed. From a problem standpoint: Recently had TKR . 7 weeks out. Feeling like she isn't progressing fast enough. States she often gets lightheaded. Tires out faster than she feels she should. No chest pain, shortness of breath. She has been taking bps at home along with pulse and pulses generally bradycardic. Has been as low as 30. Has been as high as 90s. Bps generally in the 130s--higher with thanksgiving but lower usually. Tolerating pravachol.   No myalgias. Patient Care Team:  Grabiel Calyton DO as PCP - General (Family Medicine)     Review of Systems:     Review of Systems   Constitutional:  Positive for fatigue. Negative for chills, fever and unexpected weight change. HENT:  Negative for congestion, ear pain, hearing loss, postnasal drip, rhinorrhea, sinus pressure, sinus pain, sore throat, trouble swallowing and voice change. Eyes:  Negative for pain, redness and visual disturbance. Respiratory:  Negative for cough and shortness of breath. Cardiovascular:  Negative for chest pain, palpitations and leg swelling. Gastrointestinal:  Negative for abdominal pain, constipation, diarrhea, nausea and vomiting. Endocrine: Negative for cold intolerance, heat intolerance, polydipsia and polyuria. Genitourinary:  Negative for dysuria, frequency, hematuria and urgency. Musculoskeletal:  Negative for arthralgias, back pain, joint swelling and myalgias. Skin:  Negative for color change and rash. No suspicious lesions   Neurological:  Positive for light-headedness. Negative for seizures, syncope, weakness, numbness and headaches. Hematological:  Negative for adenopathy. All other systems reviewed and are negative.        Problem List:     Patient Active Problem List   Diagnosis    Benign essential hypertension    Hyperlipidemia    Osteoporosis    Chronic pain of right knee    Atrial fibrillation (HCC)    Osteoarthritis of right knee      Past Medical and Surgical History:     Past Medical History:   Diagnosis Date    Arthritis 05/2021    R knee replacement scheduled  01/23    Cataract     bilateral     Hyperlipidemia     Hypertension     Osteopenia     Urinary incontinence      Past Surgical History:   Procedure Laterality Date    EYE SURGERY  02/2022    Cateracts    INCONTINENCE SURGERY      REPLACEMENT TOTAL KNEE Right     TUBAL LIGATION        Family History:     Family History   Problem Relation Age of Onset    Hypertension Mother No Known Problems Father     Diabetes Maternal Grandmother     No Known Problems Maternal Grandfather     No Known Problems Paternal Grandmother     No Known Problems Paternal Grandfather     No Known Problems Sister     No Known Problems Daughter     No Known Problems Son     No Known Problems Daughter     No Known Problems Son     No Known Problems Maternal Aunt     No Known Problems Paternal Aunt     Breast cancer Neg Hx     Breast cancer additional onset Neg Hx     Colon cancer Neg Hx     Endometrial cancer Neg Hx     Ovarian cancer Neg Hx     BRCA 1/2 Neg Hx     BRCA1 Negative Neg Hx     BRCA1 Positive Neg Hx     BRCA2 Negative Neg Hx     BRCA2 Positive Neg Hx       Social History:     Social History     Socioeconomic History    Marital status:      Spouse name: Tatianna Horn     Number of children: 3    Years of education: None    Highest education level: None   Occupational History    Occupation: Retired    Tobacco Use    Smoking status: Former     Packs/day: 0.25     Years: 10.00     Total pack years: 2.50     Types: Cigarettes     Quit date: 1985     Years since quittin.0    Smokeless tobacco: Never   Vaping Use    Vaping Use: Never used   Substance and Sexual Activity    Alcohol use: Yes    Drug use: Never    Sexual activity: Not Currently     Birth control/protection: Post-menopausal   Other Topics Concern    None   Social History Narrative    3 children, Hali Flor        RN     Social Determinants of Health     Financial Resource Strain: Medium Risk (2023)    Overall Financial Resource Strain (CARDIA)     Difficulty of Paying Living Expenses: Somewhat hard   Food Insecurity: No Food Insecurity (2023)    Hunger Vital Sign     Worried About Running Out of Food in the Last Year: Never true     Ran Out of Food in the Last Year: Never true   Transportation Needs: No Transportation Needs (2023)    PRAPARE - Transportation     Lack of Transportation (Medical): No     Lack of Transportation (Non-Medical): No   Physical Activity: Not on file   Stress: Not on file   Social Connections: Not on file   Intimate Partner Violence: Not on file   Housing Stability: Unknown (9/12/2023)    Housing Stability Vital Sign     Unable to Pay for Housing in the Last Year: No     Number of Places Lived in the Last Year: 1     Unstable Housing in the Last Year: Not on file      Medications and Allergies:     Current Outpatient Medications   Medication Sig Dispense Refill    apixaban (Eliquis) 5 mg Take 1 tablet (5 mg total) by mouth 2 (two) times a day 180 tablet 5    clobetasol (TEMOVATE) 0.05 % ointment Apply topically 2 (two) times a day (Patient taking differently: Apply topically 2 (two) times a week) 30 g 3    diltiazem (DILACOR XR) 120 MG 24 hr capsule Take 1 capsule (120 mg total) by mouth daily 90 capsule 5    hydrochlorothiazide (MICROZIDE) 12.5 mg capsule Take 2 capsules (25 mg total) by mouth every morning 180 capsule 5    Mirabegron ER (Myrbetriq) 50 MG TB24 Take 1 tablet (50 mg total) by mouth daily 90 tablet 0    pravastatin (PRAVACHOL) 20 mg tablet TAKE 1 TABLET DAILY 90 tablet 3     No current facility-administered medications for this visit.      Allergies   Allergen Reactions    Metoprolol Hives and Shortness Of Breath     Lightheadedness, cough    Ibandronic Acid Other (See Comments)     Joint pain      Immunizations:     Immunization History   Administered Date(s) Administered    BCG 01/18/2012    COVID-19 PFIZER VACCINE 0.3 ML IM 02/14/2021, 03/07/2021, 10/16/2021    COVID-19 Pfizer Vac BIVALENT Rangel-sucrose 12 Yr+ IM 12/05/2022    Pneumococcal Conjugate 13-Valent 06/20/2017    Pneumococcal Polysaccharide PPV23 07/03/2018    Tdap 11/26/2013      Health Maintenance:         Topic Date Due    Colorectal Cancer Screening  02/03/2024    Breast Cancer Screening: Mammogram  07/07/2024    Hepatitis C Screening  Completed         Topic Date Due    COVID-19 Vaccine (5 - Pfizer series) 04/05/2023      Medicare Screening Tests and Risk Assessments:     Messi Madera is here for her Subsequent Wellness visit. Last Medicare Wellness visit information reviewed, patient interviewed, no change since last AWV. Health Risk Assessment:   Patient rates overall health as good. Patient feels that their physical health rating is same. Patient is very satisfied with their life. Eyesight was rated as same. Hearing was rated as same. Patient feels that their emotional and mental health rating is same. Patients states they are never, rarely angry. Patient states they are sometimes unusually tired/fatigued. Pain experienced in the last 7 days has been none. Patient states that she has experienced no weight loss or gain in last 6 months. Fall Risk Screening: In the past year, patient has experienced: no history of falling in past year      Urinary Incontinence Screening:   Patient has leaked urine accidently in the last six months. Home Safety:  Patient has trouble with stairs inside or outside of their home. Patient has working smoke alarms and has working carbon monoxide detector. Home safety hazards include: none. Nutrition:   Current diet is Regular. Medications:   Patient is currently taking over-the-counter supplements. OTC medications include: see medication list. Patient is able to manage medications. Activities of Daily Living (ADLs)/Instrumental Activities of Daily Living (IADLs):   Walk and transfer into and out of bed and chair?: Yes  Dress and groom yourself?: Yes    Bathe or shower yourself?: Yes    Feed yourself?  Yes  Do your laundry/housekeeping?: Yes  Manage your money, pay your bills and track your expenses?: Yes  Make your own meals?: Yes    Do your own shopping?: Yes    Previous Hospitalizations:   Any hospitalizations or ED visits within the last 12 months?: Yes    How many hospitalizations have you had in the last year?: 1-2    Advance Care Planning:   Living will: Yes    Advanced directive: Yes      Cognitive Screening:   Provider or family/friend/caregiver concerned regarding cognition?: No    PREVENTIVE SCREENINGS      Cardiovascular Screening:    General: Screening Not Indicated and History Lipid Disorder      Diabetes Screening:     General: Screening Current      Colorectal Cancer Screening:     General: Screening Current      Breast Cancer Screening:     General: Screening Current      Cervical Cancer Screening:    General: Screening Not Indicated      Osteoporosis Screening:    General: Screening Not Indicated and History Osteoporosis      Abdominal Aortic Aneurysm (AAA) Screening:        General: Screening Not Indicated      Lung Cancer Screening:     General: Screening Not Indicated      Hepatitis C Screening:    General: Screening Current    Screening, Brief Intervention, and Referral to Treatment (SBIRT)    Screening  Typical number of drinks in a day: 0  Typical number of drinks in a week: 2  Interpretation: Low risk drinking behavior. Single Item Drug Screening:  How often have you used an illegal drug (including marijuana) or a prescription medication for non-medical reasons in the past year? never    Single Item Drug Screen Score: 0  Interpretation: Negative screen for possible drug use disorder    No results found. Physical Exam:     /80 (BP Location: Left arm, Patient Position: Standing)   Pulse 68   Temp 97.5 °F (36.4 °C)   Resp 16   Ht 5' 2" (1.575 m)   Wt 69.4 kg (153 lb)   SpO2 99%   BMI 27.98 kg/m²     Physical Exam  Vitals and nursing note reviewed. Constitutional:       General: She is not in acute distress. Appearance: She is well-developed. HENT:      Head: Normocephalic and atraumatic.       Right Ear: Tympanic membrane, ear canal and external ear normal.      Left Ear: Tympanic membrane, ear canal and external ear normal.      Nose: Nose normal.      Mouth/Throat:      Mouth: Mucous membranes are moist. Pharynx: No oropharyngeal exudate or posterior oropharyngeal erythema. Eyes:      Conjunctiva/sclera: Conjunctivae normal.   Neck:      Vascular: No carotid bruit. Cardiovascular:      Rate and Rhythm: Normal rate and regular rhythm. Heart sounds: No murmur heard. Pulmonary:      Effort: Pulmonary effort is normal. No respiratory distress. Breath sounds: Normal breath sounds. Abdominal:      Palpations: Abdomen is soft. Tenderness: There is no abdominal tenderness. Musculoskeletal:         General: No swelling. Cervical back: Neck supple. Lymphadenopathy:      Cervical: No cervical adenopathy. Skin:     General: Skin is warm and dry. Capillary Refill: Capillary refill takes less than 2 seconds. Neurological:      General: No focal deficit present. Mental Status: She is alert and oriented to person, place, and time.    Psychiatric:         Mood and Affect: Mood normal.          Nigel Doan DO

## 2023-12-01 ENCOUNTER — DOCUMENTATION (OUTPATIENT)
Dept: CARDIOLOGY CLINIC | Facility: CLINIC | Age: 73
End: 2023-12-01

## 2023-12-01 ENCOUNTER — APPOINTMENT (OUTPATIENT)
Dept: LAB | Facility: CLINIC | Age: 73
End: 2023-12-01
Payer: COMMERCIAL

## 2023-12-01 DIAGNOSIS — I48.91 ATRIAL FIBRILLATION WITH RVR (HCC): Primary | ICD-10-CM

## 2023-12-01 DIAGNOSIS — R42 LIGHTHEADEDNESS: ICD-10-CM

## 2023-12-01 LAB
BASOPHILS # BLD AUTO: 0.04 THOUSANDS/ÂΜL (ref 0–0.1)
BASOPHILS NFR BLD AUTO: 1 % (ref 0–1)
EOSINOPHIL # BLD AUTO: 0.17 THOUSAND/ÂΜL (ref 0–0.61)
EOSINOPHIL NFR BLD AUTO: 3 % (ref 0–6)
ERYTHROCYTE [DISTWIDTH] IN BLOOD BY AUTOMATED COUNT: 12.3 % (ref 11.6–15.1)
HCT VFR BLD AUTO: 37.4 % (ref 34.8–46.1)
HGB BLD-MCNC: 12.1 G/DL (ref 11.5–15.4)
IMM GRANULOCYTES # BLD AUTO: 0.01 THOUSAND/UL (ref 0–0.2)
IMM GRANULOCYTES NFR BLD AUTO: 0 % (ref 0–2)
LYMPHOCYTES # BLD AUTO: 1.45 THOUSANDS/ÂΜL (ref 0.6–4.47)
LYMPHOCYTES NFR BLD AUTO: 23 % (ref 14–44)
MCH RBC QN AUTO: 30.3 PG (ref 26.8–34.3)
MCHC RBC AUTO-ENTMCNC: 32.4 G/DL (ref 31.4–37.4)
MCV RBC AUTO: 94 FL (ref 82–98)
MONOCYTES # BLD AUTO: 0.83 THOUSAND/ÂΜL (ref 0.17–1.22)
MONOCYTES NFR BLD AUTO: 13 % (ref 4–12)
NEUTROPHILS # BLD AUTO: 3.88 THOUSANDS/ÂΜL (ref 1.85–7.62)
NEUTS SEG NFR BLD AUTO: 60 % (ref 43–75)
NRBC BLD AUTO-RTO: 0 /100 WBCS
PLATELET # BLD AUTO: 272 THOUSANDS/UL (ref 149–390)
PMV BLD AUTO: 9.2 FL (ref 8.9–12.7)
RBC # BLD AUTO: 4 MILLION/UL (ref 3.81–5.12)
WBC # BLD AUTO: 6.38 THOUSAND/UL (ref 4.31–10.16)

## 2023-12-01 PROCEDURE — 36415 COLL VENOUS BLD VENIPUNCTURE: CPT

## 2023-12-01 PROCEDURE — 85025 COMPLETE CBC W/AUTO DIFF WBC: CPT

## 2024-01-10 ENCOUNTER — TELEPHONE (OUTPATIENT)
Dept: CARDIOLOGY CLINIC | Facility: CLINIC | Age: 74
End: 2024-01-10

## 2024-01-10 ENCOUNTER — CLINICAL SUPPORT (OUTPATIENT)
Dept: CARDIOLOGY CLINIC | Facility: CLINIC | Age: 74
End: 2024-01-10
Payer: COMMERCIAL

## 2024-01-10 DIAGNOSIS — I48.91 ATRIAL FIBRILLATION WITH RVR (HCC): ICD-10-CM

## 2024-01-10 DIAGNOSIS — I48.91 ATRIAL FIBRILLATION WITH RVR (HCC): Primary | ICD-10-CM

## 2024-01-10 PROCEDURE — 93248 EXT ECG>7D<15D REV&INTERPJ: CPT | Performed by: STUDENT IN AN ORGANIZED HEALTH CARE EDUCATION/TRAINING PROGRAM

## 2024-01-10 NOTE — RESULT ENCOUNTER NOTE
Please notify pt there were some abnormal findings on her ziopatch. Nothing emergent.    Please ask her to schedule with the EP team of Afib specialists to further discuss her options. She should continue her current regimen including her AC now.      Thanks!    - Nader

## 2024-01-10 NOTE — RESULT ENCOUNTER NOTE
Impression:    Patient had a min HR of 38 bpm, max HR of 235 bpm, and avg HR of 70 bpm.  Predominant underlying rhythm was Sinus Rhythm. 871 Supraventricular  Tachycardia runs occurred, the run with the fastest interval lasting 5 beats with a  max rate of 235 bpm, the longest lasting 11.7 secs with an avg rate of 177 bpm.  Atrial Fibrillation occurred (1% burden), ranging from  bpm (avg of 140 bpm),  the longest lasting 35 mins 16 secs with an avg rate of 147 bpm.     1 Pause occurred lasting 3.5 secs (17 bpm).     Supraventricular Tachycardia and Atrial Fibrillation were  detected within +/- 45 seconds of symptomatic patient event(s). Isolated SVEs were  frequent (5.1%, 19423), SVE Couplets were rare (<1.0%, 4487), and SVE Triplets  were rare (<1.0%, 1600). Isolated VEs were rare (<1.0%, 4359), VE Couplets were  rare (<1.0%, 86), and VE Triplets were rare (<1.0%, 1). MD notification criteria for  Rapid Atrial Fibrillation met - report posted prior to notification per account request  ([facility]).    The recording showed rapid AF as described above. 1 conversion pause noted.    Patient reported symptoms correlated with Afib, conversion pause, NSR and sinus bradycardia.    I reviewed all strips personally.

## 2024-01-11 ENCOUNTER — TELEPHONE (OUTPATIENT)
Dept: CARDIOLOGY CLINIC | Facility: CLINIC | Age: 74
End: 2024-01-11

## 2024-01-11 NOTE — TELEPHONE ENCOUNTER
Called patient and gave results. Will have E/P call to make appointment.       ----- Message from Nader Benedict MD sent at 1/10/2024  4:13 PM EST -----  Please notify pt there were some abnormal findings on her ziopatch. Nothing emergent.    Please ask her to schedule with the EP team of Afib specialists to further discuss her options. She should continue her current regimen including her AC now.      Thanks!    - Nader

## 2024-01-22 DIAGNOSIS — E78.2 MIXED HYPERLIPIDEMIA: ICD-10-CM

## 2024-01-22 DIAGNOSIS — R32 URINARY INCONTINENCE, UNSPECIFIED TYPE: ICD-10-CM

## 2024-01-22 RX ORDER — PRAVASTATIN SODIUM 20 MG
TABLET ORAL
Qty: 90 TABLET | Refills: 1 | Status: SHIPPED | OUTPATIENT
Start: 2024-01-22

## 2024-01-22 RX ORDER — MIRABEGRON 50 MG/1
50 TABLET, FILM COATED, EXTENDED RELEASE ORAL DAILY
Qty: 90 TABLET | Refills: 0 | Status: SHIPPED | OUTPATIENT
Start: 2024-01-22

## 2024-03-01 NOTE — PROGRESS NOTES
Consultation - Electrophysiology-Cardiology (EP)   Amairani Vergara 74 y.o. female MRN: [id number]  Unit/Bed#:  Encounter: [id number]      1. Atrial fibrillation with RVR (HCC)  POCT ECG    NM myocardial perfusion spect (rx stress and/or rest)      2. Chest tightness  NM myocardial perfusion spect (rx stress and/or rest)      3. Benign essential hypertension        4. Paroxysmal atrial fibrillation (HCC)        5. Osteoarthritis of right knee, unspecified osteoarthritis type              Consults  Physician Requesting Consult: Nader Benedict  Reason for Consult / Principal Problem: atrial fibrillation       Summary of my recommendation for the patient  Patient complains of intermittent exertional intolerance, chest tightness, palpitation -documented A-fib 8%    Used metoprolol, did not tolerate and currently on diltiazem and Eliquis    Explained in detail pathogenesis and therapy  Anticoagulation-Eliquis  Rate control-diltiazem  Rhythm control -if no ischemia on nuclear stress study can use flecainide along with diltiazem; avoid propafenone and sotalol because of intrinsic beta-blocker effect; dofetilide cannot be used with diltiazem; amiodarone has significant long-term side effects; if flecainide fails then will need to proceed with ablation; ablation has 70% success rate in a year compared to 30% for medication in  its ability to maintain sinus rhythm    Patient does have underlying sick sinus syndrome, 1 conversion pause greater than 3 seconds-if has significant bradycardia will end up needing pacemaker    Patient is going to think about all treatment option and contact us at [phone] as to how she wants to proceed-medication/ablation/pacemaker    Nuclear stress study so that we can consider initiating flecainide        Clinical conditions  Atrial fibrillation with RVR, burden 8% , incidentally noted when went for knee surgery, went back to rhythm by herself  Long-term  anticoagulation  Hypertension  Hyperlipidemia          Assessment/Plan     Atrial fibrillation with RVR, burden 8% , incidentally noted when went for knee surgery, went back to rhythm by herself  Long-term anticoagulation    Patient complains of intermittent exertional intolerance, chest tightness, palpitation -documented A-fib 8%    Used metoprolol, did not tolerate and currently on diltiazem and Eliquis    Explained in detail pathogenesis and therapy    Anticoagulation-Eliquis    Rate control-diltiazem    Rhythm control -if no ischemia on nuclear stress study can use flecainide along with diltiazem; avoid propafenone and sotalol because of intrinsic beta-blocker effect; dofetilide cannot be used with diltiazem; amiodarone has significant long-term side effects; if flecainide fails then will need to proceed with ablation; ablation has 70% success rate in a year compared to 30% for medication in  its ability to maintain sinus rhythm    Patient does have underlying sick sinus syndrome, 1 conversion pause greater than 3 seconds-if has significant bradycardia will end up needing pacemaker    Patient is going to think about all treatment option and contact us at [phone] as to how she wants to proceed-medication/ablation/pacemaker    Nuclear stress study so that we can consider initiating flecainide      Hypertension  136/70  Patient is currently on hydrochlorothiazide and diltiazem      Hyperlipidemia  On pravastatin  No myositis or myalgia              History of Present Illness   HPI: Amairani Vergara is a 74 y.o. year old female has been referred to me by Dr Benedict for the evaluation and management of atrial fibrillation with RVR      The patient has significant medical illnesses which include  Atrial fibrillation with RVR, burden 8% , incidentally noted when went for knee surgery, went back to rhythm by herself  Long-term anticoagulation  Hypertension  Hyperlipidemia      Initially diagnosed with A-fib when went in for  knee surgery-incidentally identified-2023    Since then she has had multiple episodes which are associated with some palpitation, some exertional intolerance and chest tightness    She is not complaining of orthopnea, PND, leg swelling  She is not complaining of syncope  She does have some exertional intolerance    She does not abuse tobacco alcohol or energy drinks    She does not snore  She does have occasional morning fatigue and daytime sleepiness          Historical Information   Past Medical History:   Diagnosis Date    Arthritis 2021    R knee replacement scheduled      Cataract     bilateral     Hyperlipidemia     Hypertension     Osteopenia     Urinary incontinence      Past Surgical History:   Procedure Laterality Date    CATARACT EXTRACTION Left 2022    [facility], Dr. Monet    CATARACT EXTRACTION Right 2022    [facility], Dr. Monet    INCONTINENCE SURGERY      REPLACEMENT TOTAL KNEE Right 10/11/2023    [facility], Dr. Murtaza Barr    TUBAL LIGATION       Social History     Substance and Sexual Activity   Alcohol Use Yes     Social History     Substance and Sexual Activity   Drug Use Never     Social History     Tobacco Use   Smoking Status Former    Current packs/day: 0.00    Average packs/day: 0.3 packs/day for 10.0 years (2.5 ttl pk-yrs)    Types: Cigarettes    Start date: 1975    Quit date: 1985    Years since quittin.3   Smokeless Tobacco Never     Social History     Socioeconomic History    Marital status:      Spouse name: Mohsen     Number of children: 3    Years of education: Not on file    Highest education level: Not on file   Occupational History    Occupation: Retired    Tobacco Use    Smoking status: Former     Current packs/day: 0.00     Average packs/day: 0.3 packs/day for 10.0 years (2.5 ttl pk-yrs)     Types: Cigarettes     Start date: 1975     Quit date: 1985     Years since  quittin.3    Smokeless tobacco: Never   Vaping Use    Vaping status: Never Used   Substance and Sexual Activity    Alcohol use: Yes    Drug use: Never    Sexual activity: Not Currently     Birth control/protection: Post-menopausal   Other Topics Concern    Not on file   Social History Narrative    3 children, Felicia Sandoval Christopher        RN     Social Determinants of Health     Financial Resource Strain: Medium Risk (2023)    Overall Financial Resource Strain (CARDIA)     Difficulty of Paying Living Expenses: Somewhat hard   Food Insecurity: No Food Insecurity (10/11/2023)    Received from [facility]    Hunger Vital Sign     Worried About Running Out of Food in the Last Year: Never true     Ran Out of Food in the Last Year: Never true   Transportation Needs: No Transportation Needs (2023)    PRAPARE - Transportation     Lack of Transportation (Medical): No     Lack of Transportation (Non-Medical): No   Physical Activity: Not on file   Stress: Not on file   Social Connections: Not on file   Intimate Partner Violence: Not At Risk (10/11/2023)    Received from [facility]    Humiliation, Afraid, Rape, and Kick questionnaire     Fear of Current or Ex-Partner: No     Emotionally Abused: No     Physically Abused: No     Sexually Abused: No   Housing Stability: Low Risk  (10/11/2023)    Received from [facility]    Housing Stability Vital Sign     Unable to Pay for Housing in the Last Year: No     Number of Places Lived in the Last Year: 1     Unstable Housing in the Last Year: No     .  Family History:  Family History   Problem Relation Age of Onset    Hypertension Mother     No Known Problems Father     Diabetes Maternal Grandmother     No Known Problems Maternal Grandfather     No Known Problems Paternal Grandmother     No Known Problems Paternal Grandfather     No Known Problems Sister     No Known Problems Daughter     No Known Problems  "Son     No Known Problems Daughter     No Known Problems Son     No Known Problems Maternal Aunt     No Known Problems Paternal Aunt     Breast cancer Neg Hx     Breast cancer additional onset Neg Hx     Colon cancer Neg Hx     Endometrial cancer Neg Hx     Ovarian cancer Neg Hx     BRCA 1/2 Neg Hx     BRCA1 Negative Neg Hx     BRCA1 Positive Neg Hx     BRCA2 Negative Neg Hx     BRCA2 Positive Neg Hx          Meds/Allergies      No current facility-administered medications for this visit.        (Not in a hospital admission)      Allergies   Allergen Reactions    Metoprolol Hives and Shortness Of Breath     Lightheadedness, cough    Ibandronic Acid Other (See Comments)     Joint pain           Objective   Vitals: Visit Vitals  /70 (BP Location: Left arm, Patient Position: Sitting, Cuff Size: Standard)   Pulse 71   Ht 5' 2\" (1.575 m)   Wt 69.9 kg (154 lb 3.2 oz)   SpO2 97%   BMI 28.20 kg/m²   OB Status Postmenopausal   Smoking Status Former   BSA 1.71 m²      Vitals:    03/04/24 1101   Weight: 69.9 kg (154 lb 3.2 oz)   [unfilled]    Invasive Devices       None                     ROS  Review of Systems   All other systems reviewed and are negative.  As described in my history of present illness        PHYSICAL EXAM  Physical Exam  Vitals reviewed.   Constitutional:       General: She is not in acute distress.     Appearance: Normal appearance. She is not ill-appearing.   HENT:      Head: Normocephalic and atraumatic.      Right Ear: External ear normal.      Left Ear: External ear normal.      Nose: Nose normal.      Mouth/Throat:      Comments: Posterior pharynx is slightly crowded  Eyes:      General: No scleral icterus.     Extraocular Movements: Extraocular movements intact.      Conjunctiva/sclera: Conjunctivae normal.      Pupils: Pupils are equal, round, and reactive to light.   Cardiovascular:      Rate and Rhythm: Normal rate and regular rhythm.      Pulses: Normal pulses.      Heart sounds: Normal " heart sounds. No murmur heard.     No gallop.   Pulmonary:      Effort: Pulmonary effort is normal. No respiratory distress.      Breath sounds: Normal breath sounds. No wheezing or rales.   Abdominal:      General: Bowel sounds are normal. There is no distension.      Tenderness: There is no abdominal tenderness.   Musculoskeletal:         General: No swelling, tenderness or deformity.      Cervical back: Neck supple. No rigidity.   Skin:     Coloration: Skin is not jaundiced.      Findings: No bruising or lesion.   Neurological:      Mental Status: She is alert and oriented to person, place, and time. Mental status is at baseline.      Motor: No weakness.   Psychiatric:         Mood and Affect: Mood normal.         Behavior: Behavior normal.         Thought Content: Thought content normal.         Judgment: Judgment normal.               LAB RESULTS:    CBC:  WBC   Date Value Ref Range Status   12/01/2023 6.38 4.31 - 10.16 Thousand/uL Final     Hemoglobin   Date Value Ref Range Status   12/01/2023 12.1 11.5 - 15.4 g/dL Final     Hematocrit   Date Value Ref Range Status   12/01/2023 37.4 34.8 - 46.1 % Final     MCV   Date Value Ref Range Status   12/01/2023 94 82 - 98 fL Final     Platelets   Date Value Ref Range Status   12/01/2023 272 149 - 390 Thousands/uL Final     RBC   Date Value Ref Range Status   12/01/2023 4.00 3.81 - 5.12 Million/uL Final     MCH   Date Value Ref Range Status   12/01/2023 30.3 26.8 - 34.3 pg Final     MCHC   Date Value Ref Range Status   12/01/2023 32.4 31.4 - 37.4 g/dL Final     RDW   Date Value Ref Range Status   12/01/2023 12.3 11.6 - 15.1 % Final     MPV   Date Value Ref Range Status   12/01/2023 9.2 8.9 - 12.7 fL Final     nRBC   Date Value Ref Range Status   12/01/2023 0 /100 WBCs Final       CMP:  Potassium   Date Value Ref Range Status   10/12/2023 3.6 3.5 - 5.2 mmol/L Final     Chloride   Date Value Ref Range Status   10/12/2023 98 (L) 100 - 109 mmol/L Final     Carbon Dioxide  "  Date Value Ref Range Status   10/12/2023 23 21 - 31 mmol/L Final     BUN   Date Value Ref Range Status   10/12/2023 12 7 - 25 mg/dL Final     Creatinine   Date Value Ref Range Status   10/12/2023 0.87 0.40 - 1.10 mg/dL Final     Calcium   Date Value Ref Range Status   10/12/2023 8.8 8.5 - 10.1 mg/dL Final     AST   Date Value Ref Range Status   09/12/2023 15 13 - 39 U/L Final     ALT   Date Value Ref Range Status   09/12/2023 12 7 - 52 U/L Final     Comment:     Specimen collection should occur prior to Sulfasalazine administration due to the potential for falsely depressed results.      Alkaline Phosphatase   Date Value Ref Range Status   09/12/2023 63 34 - 104 U/L Final     eGFRcr   Date Value Ref Range Status   10/12/2023 70 >59 Final        Magnesium:   No results found for: \"MG\", \"MAGNESIUM\"     A1C:  No results found for: \"HGBA1C\"     TSH:  TSH 3RD GENERATON   Date Value Ref Range Status   11/17/2023 1.748 0.450 - 4.500 uIU/mL Final     Comment:     The recommended reference ranges for TSH during pregnancy are as follows:   First trimester 0.100 to 2.500 uIU/mL   Second trimester  0.200 to 3.000 uIU/mL   Third trimester 0.300 to 3.000 uIU/m    Note: Normal ranges may not apply to patients who are transgender, non-binary, or whose legal sex, sex at birth, and gender identity differ.  Adult TSH (3rd generation) reference range follows the recommended guidelines of the [employer], January, 2020.        PT/INR:  No results found for: \"PROTIME\", \"INR\"    Lipid Panel:  Cholesterol   Date Value Ref Range Status   11/17/2023 178 See Comment mg/dL Final     Comment:     Cholesterol:         Pediatric <18 Years        Desirable          <170 mg/dL      Borderline High    170-199 mg/dL      High               >=200 mg/dL        Adult >=18 Years            Desirable        <200 mg/dL      Borderline High  200-239 mg/dL      High             >239 mg/dL       Triglycerides   Date Value Ref Range Status " "  2023 109 See Comment mg/dL Final     Comment:     Triglyceride:     0-9Y            <75mg/dL     10Y-17Y         <90 mg/dL       >=18Y     Normal          <150 mg/dL     Borderline High 150-199 mg/dL     High            200-499 mg/dL        Very High       >499 mg/dL    Specimen collection should occur prior to Metamizole administration due to the potential for falsely depressed results.     HDL, Direct   Date Value Ref Range Status   2023 70 >=50 mg/dL Final     Non-HDL-Chol (CHOL-HDL)   Date Value Ref Range Status   2023 108 mg/dl Final       Troponin:  No results found for: \"TROPONINI\"      Imaging:    [id number]        ALEX:  No results found for this or any previous visit.      Echocardiogram:  Results for orders placed during the hospital encounter of 23    Echo complete w/ contrast if indicated    Interpretation Summary    Left Ventricle: Left ventricular cavity size is normal. Wall thickness is normal. The left ventricular ejection fraction is 60%. Systolic function is normal. Wall motion is normal. Diastolic function is normal for age.    Right Ventricle: Right ventricular cavity size is normal. Systolic function is normal.      Stress Test:   No results found for this or any previous visit.      Cardiac Catheterization:  No results found for this or any previous visit.      HOLTER MONITOR: 24 HOUR/48 HOUR MONITORS  Results for orders placed during the hospital encounter of 23    Holter monitor    Interpretation Summary  INDICATIONS: Palpitations    DESCRIPTION OF FINDINGS:  The patient was monitored for a total of 48 hours.  The patient was predominantly noted to be in sinus rhythm throughout the study.  The average heart rate was 71 beats per minute.  The heart rate ranged from a low of 45 beats per minute in sinus bradycardia at 8:19 AM to a maximum of 171 beats per minute in atrial fibrillation at 8:20 PM.    Ventricular ectopic activity consisted of 43 PVCs.  " There was no sustained or nonsustained ventricular tachycardia.    Supraventricular ectopic activity consisted of 5251 PACs (2.6% of total beats).  Atrial fibrillation is present at an 8% burden  There were no significant pauses. The longest R-R interval was 2.1 seconds.  There was no evidence of advanced degree heart block.    The accompanying patient diary notes symptoms of shortness of breath. Correlation with the tracings at these times reveals rapid A-fib.    Impression  Predominantly sinus rhythm throughout.  Episodes of atrial fibrillation with rapid ventricular response total burden 8%.  These seem to correlate with symptoms of shortness of breath.  Rare PACs and PVCs      AMB Extended Holter Monitor: Zio XT/AT or BioTel  Results for orders placed in visit on 01/10/24    AMB extended holter monitor    Zio XT  12/14/2023 - 12/28/2023   Nader Benedict MD  1/10/2024  4:13 PM EST       Please notify pt there were some abnormal findings on her ziopatch. Nothing emergent.     Please ask her to schedule with the EP team of Afib specialists to further discuss her options. She should continue her current regimen including her AC now.        Thanks!     - Nader Benedict MD  1/10/2024  4:12 PM EST       Impression:     Patient had a min HR of 38 bpm, max HR of 235 bpm, and avg HR of 70 bpm.  Predominant underlying rhythm was Sinus Rhythm. 871 Supraventricular  Tachycardia runs occurred, the run with the fastest interval lasting 5 beats with a  max rate of 235 bpm, the longest lasting 11.7 secs with an avg rate of 177 bpm.  Atrial Fibrillation occurred (1% burden), ranging from  bpm (avg of 140 bpm),  the longest lasting 35 mins 16 secs with an avg rate of 147 bpm.     1 Pause occurred lasting 3.5 secs (17 bpm).     Supraventricular Tachycardia and Atrial Fibrillation were  detected within +/- 45 seconds of symptomatic patient event(s). Isolated SVEs were  frequent (5.1%, 87040), SVE Couplets were rare (<1.0%,  4487), and SVE Triplets  were rare (<1.0%, 1600). Isolated VEs were rare (<1.0%, 4359), VE Couplets were  rare (<1.0%, 86), and VE Triplets were rare (<1.0%, 1). MD notification criteria for  Rapid Atrial Fibrillation met - report posted prior to notification per account request  ([facility]).     The recording showed rapid AF as described above. 1 conversion pause noted.     Patient reported symptoms correlated with Afib, conversion pause, NSR and sinus bradycardia.     I reviewed all strips personally.                           DEVICE CHECK:     No results found for this or any previous visit.         Code Status: [unfilled]  Advance Directive and Living Will:      Power of :    POLST:      Counseling / Coordination of Care  Detailed discussion done with regards to management of [id number] medication, ablation, pacemaker    Patient is going to discuss at home and then make a final decision      Huang Kim MD

## 2024-03-04 ENCOUNTER — CONSULT (OUTPATIENT)
Dept: CARDIOLOGY CLINIC | Facility: CLINIC | Age: 74
End: 2024-03-04
Payer: COMMERCIAL

## 2024-03-04 VITALS
DIASTOLIC BLOOD PRESSURE: 70 MMHG | SYSTOLIC BLOOD PRESSURE: 136 MMHG | BODY MASS INDEX: 28.37 KG/M2 | HEART RATE: 71 BPM | HEIGHT: 62 IN | OXYGEN SATURATION: 97 % | WEIGHT: 154.2 LBS

## 2024-03-04 DIAGNOSIS — I48.91 ATRIAL FIBRILLATION WITH RVR (HCC): Primary | ICD-10-CM

## 2024-03-04 DIAGNOSIS — R07.89 CHEST TIGHTNESS: ICD-10-CM

## 2024-03-04 DIAGNOSIS — M17.11 OSTEOARTHRITIS OF RIGHT KNEE, UNSPECIFIED OSTEOARTHRITIS TYPE: ICD-10-CM

## 2024-03-04 DIAGNOSIS — I48.0 PAROXYSMAL ATRIAL FIBRILLATION (HCC): ICD-10-CM

## 2024-03-04 DIAGNOSIS — I10 BENIGN ESSENTIAL HYPERTENSION: ICD-10-CM

## 2024-03-04 PROCEDURE — 93000 ELECTROCARDIOGRAM COMPLETE: CPT | Performed by: INTERNAL MEDICINE

## 2024-03-04 PROCEDURE — 99215 OFFICE O/P EST HI 40 MIN: CPT | Performed by: INTERNAL MEDICINE

## 2024-03-04 NOTE — PATIENT INSTRUCTIONS
- Schedule stress test prior to next appointment with me; test ordered   - Call [phone] to schedule

## 2024-03-04 NOTE — LETTER
March 4, 2024     Dania Dorman DO  [address]  [address]  [facility] [address]    Patient: Amairani Vergara   YOB: 1950   Date of Visit: 3/4/2024       Dear Dr. Dorman:    Thank you for referring Amairani Vergara to me for evaluation. Below are my notes for this consultation.    If you have questions, please do not hesitate to call me. I look forward to following your patient along with you.         Sincerely,        Huang Kim MD        CC: Amairani Vergara  MD Huang Leija MD  3/4/2024 11:54 AM  Sign when Signing Visit   Consultation - Electrophysiology-Cardiology (EP)   Amairani Vergara 74 y.o. female MRN: [id number]  Unit/Bed#:  Encounter: [id number]      1. Atrial fibrillation with RVR (HCC)  POCT ECG    NM myocardial perfusion spect (rx stress and/or rest)      2. Chest tightness  NM myocardial perfusion spect (rx stress and/or rest)      3. Benign essential hypertension        4. Paroxysmal atrial fibrillation (HCC)        5. Osteoarthritis of right knee, unspecified osteoarthritis type              Consults  Physician Requesting Consult: Nadre Benedict  Reason for Consult / Principal Problem: atrial fibrillation       Summary of my recommendation for the patient  Patient complains of intermittent exertional intolerance, chest tightness, palpitation -documented A-fib 8%    Used metoprolol, did not tolerate and currently on diltiazem and Eliquis    Explained in detail pathogenesis and therapy  Anticoagulation-Eliquis  Rate control-diltiazem  Rhythm control -if no ischemia on nuclear stress study can use flecainide along with diltiazem; avoid propafenone and sotalol because of intrinsic beta-blocker effect; dofetilide cannot be used with diltiazem; amiodarone has significant long-term side effects; if flecainide fails then will need to proceed with ablation; ablation has 70% success rate in a year compared to 30% for medication in  its ability to maintain sinus rhythm    Patient does have  underlying sick sinus syndrome, 1 conversion pause greater than 3 seconds-if has significant bradycardia will end up needing pacemaker    Patient is going to think about all treatment option and contact us at [phone] as to how she wants to proceed-medication/ablation/pacemaker          Clinical conditions  Atrial fibrillation with RVR, burden 8% , incidentally noted when went for knee surgery, went back to rhythm by herself  Long-term anticoagulation  Hypertension  Hyperlipidemia          Assessment/Plan    Atrial fibrillation with RVR, burden 8% , incidentally noted when went for knee surgery, went back to rhythm by herself  Long-term anticoagulation    Patient complains of intermittent exertional intolerance, chest tightness, palpitation -documented A-fib 8%    Used metoprolol, did not tolerate and currently on diltiazem and Eliquis    Explained in detail pathogenesis and therapy    Anticoagulation-Eliquis    Rate control-diltiazem    Rhythm control -if no ischemia on nuclear stress study can use flecainide along with diltiazem; avoid propafenone and sotalol because of intrinsic beta-blocker effect; dofetilide cannot be used with diltiazem; amiodarone has significant long-term side effects; if flecainide fails then will need to proceed with ablation; ablation has 70% success rate in a year compared to 30% for medication in  its ability to maintain sinus rhythm    Patient does have underlying sick sinus syndrome, 1 conversion pause greater than 3 seconds-if has significant bradycardia will end up needing pacemaker    Patient is going to think about all treatment option and contact us at [phone] as to how she wants to proceed-medication/ablation/pacemaker          Hypertension  136/70  Patient is currently on hydrochlorothiazide and diltiazem      Hyperlipidemia  On pravastatin  No myositis or myalgia              History of Present Illness  HPI: Amairani Vergara is a 74 y.o. year old female has been referred to  me by Dr Benedict for the evaluation and management of atrial fibrillation with RVR      The patient has significant medical illnesses which include  Atrial fibrillation with RVR, burden 8% , incidentally noted when went for knee surgery, went back to rhythm by herself  Long-term anticoagulation  Hypertension  Hyperlipidemia      Initially diagnosed with A-fib when went in for knee surgery-incidentally identified-2023    Since then she has had multiple episodes which are associated with some palpitation, some exertional intolerance and chest tightness    She is not complaining of orthopnea, PND, leg swelling  She is not complaining of syncope  She does have some exertional intolerance    She does not abuse tobacco alcohol or energy drinks    She does not snore  She does have occasional morning fatigue and daytime sleepiness          Historical Information  Past Medical History:   Diagnosis Date   • Arthritis 2021    R knee replacement scheduled     • Cataract     bilateral    • Hyperlipidemia    • Hypertension    • Osteopenia    • Urinary incontinence      Past Surgical History:   Procedure Laterality Date   • CATARACT EXTRACTION Left 2022    [facility], Dr. Monet   • CATARACT EXTRACTION Right 2022    [facility], Dr. Monet   • INCONTINENCE SURGERY     • REPLACEMENT TOTAL KNEE Right 10/11/2023    [facility], Dr. Murtaza Barr   • TUBAL LIGATION       Social History     Substance and Sexual Activity   Alcohol Use Yes     Social History     Substance and Sexual Activity   Drug Use Never     Social History     Tobacco Use   Smoking Status Former   • Current packs/day: 0.00   • Average packs/day: 0.3 packs/day for 10.0 years (2.5 ttl pk-yrs)   • Types: Cigarettes   • Start date: 1975   • Quit date: 1985   • Years since quittin.3   Smokeless Tobacco Never     Social History     Socioeconomic History   • Marital status:      Spouse  name: Mohsen    • Number of children: 3   • Years of education: Not on file   • Highest education level: Not on file   Occupational History   • Occupation: Retired    Tobacco Use   • Smoking status: Former     Current packs/day: 0.00     Average packs/day: 0.3 packs/day for 10.0 years (2.5 ttl pk-yrs)     Types: Cigarettes     Start date: 1975     Quit date: 1985     Years since quittin.3   • Smokeless tobacco: Never   Vaping Use   • Vaping status: Never Used   Substance and Sexual Activity   • Alcohol use: Yes   • Drug use: Never   • Sexual activity: Not Currently     Birth control/protection: Post-menopausal   Other Topics Concern   • Not on file   Social History Narrative    3 children, Felicia Sandoval Christopher        RN     Social Determinants of Health     Financial Resource Strain: Medium Risk (2023)    Overall Financial Resource Strain (CARDIA)    • Difficulty of Paying Living Expenses: Somewhat hard   Food Insecurity: No Food Insecurity (10/11/2023)    Received from [facility]    Hunger Vital Sign    • Worried About Running Out of Food in the Last Year: Never true    • Ran Out of Food in the Last Year: Never true   Transportation Needs: No Transportation Needs (2023)    PRAPARE - Transportation    • Lack of Transportation (Medical): No    • Lack of Transportation (Non-Medical): No   Physical Activity: Not on file   Stress: Not on file   Social Connections: Not on file   Intimate Partner Violence: Not At Risk (10/11/2023)    Received from [facility]    Humiliation, Afraid, Rape, and Kick questionnaire    • Fear of Current or Ex-Partner: No    • Emotionally Abused: No    • Physically Abused: No    • Sexually Abused: No   Housing Stability: Low Risk  (10/11/2023)    Received from [facility]    Housing Stability Vital Sign    • Unable to Pay for Housing in the Last Year: No    • Number of Places Lived in the Last Year: 1  "   • Unstable Housing in the Last Year: No     .  Family History:  Family History   Problem Relation Age of Onset   • Hypertension Mother    • No Known Problems Father    • Diabetes Maternal Grandmother    • No Known Problems Maternal Grandfather    • No Known Problems Paternal Grandmother    • No Known Problems Paternal Grandfather    • No Known Problems Sister    • No Known Problems Daughter    • No Known Problems Son    • No Known Problems Daughter    • No Known Problems Son    • No Known Problems Maternal Aunt    • No Known Problems Paternal Aunt    • Breast cancer Neg Hx    • Breast cancer additional onset Neg Hx    • Colon cancer Neg Hx    • Endometrial cancer Neg Hx    • Ovarian cancer Neg Hx    • BRCA 1/2 Neg Hx    • BRCA1 Negative Neg Hx    • BRCA1 Positive Neg Hx    • BRCA2 Negative Neg Hx    • BRCA2 Positive Neg Hx          Meds/Allergies     No current facility-administered medications for this visit.        (Not in a hospital admission)      Allergies   Allergen Reactions   • Metoprolol Hives and Shortness Of Breath     Lightheadedness, cough   • Ibandronic Acid Other (See Comments)     Joint pain           Objective  Vitals: Visit Vitals  /70 (BP Location: Left arm, Patient Position: Sitting, Cuff Size: Standard)   Pulse 71   Ht 5' 2\" (1.575 m)   Wt 69.9 kg (154 lb 3.2 oz)   SpO2 97%   BMI 28.20 kg/m²   OB Status Postmenopausal   Smoking Status Former   BSA 1.71 m²      Vitals:    03/04/24 1101   Weight: 69.9 kg (154 lb 3.2 oz)   [unfilled]    Invasive Devices       None                     ROS  Review of Systems   All other systems reviewed and are negative.  As described in my history of present illness        PHYSICAL EXAM  Physical Exam  Vitals reviewed.   Constitutional:       General: She is not in acute distress.     Appearance: Normal appearance. She is not ill-appearing.   HENT:      Head: Normocephalic and atraumatic.      Right Ear: External ear normal.      Left Ear: External ear " normal.      Nose: Nose normal.      Mouth/Throat:      Comments: Posterior pharynx is slightly crowded  Eyes:      General: No scleral icterus.     Extraocular Movements: Extraocular movements intact.      Conjunctiva/sclera: Conjunctivae normal.      Pupils: Pupils are equal, round, and reactive to light.   Cardiovascular:      Rate and Rhythm: Normal rate and regular rhythm.      Pulses: Normal pulses.      Heart sounds: Normal heart sounds. No murmur heard.     No gallop.   Pulmonary:      Effort: Pulmonary effort is normal. No respiratory distress.      Breath sounds: Normal breath sounds. No wheezing or rales.   Abdominal:      General: Bowel sounds are normal. There is no distension.      Tenderness: There is no abdominal tenderness.   Musculoskeletal:         General: No swelling, tenderness or deformity.      Cervical back: Neck supple. No rigidity.   Skin:     Coloration: Skin is not jaundiced.      Findings: No bruising or lesion.   Neurological:      Mental Status: She is alert and oriented to person, place, and time. Mental status is at baseline.      Motor: No weakness.   Psychiatric:         Mood and Affect: Mood normal.         Behavior: Behavior normal.         Thought Content: Thought content normal.         Judgment: Judgment normal.               LAB RESULTS:    CBC:  WBC   Date Value Ref Range Status   12/01/2023 6.38 4.31 - 10.16 Thousand/uL Final     Hemoglobin   Date Value Ref Range Status   12/01/2023 12.1 11.5 - 15.4 g/dL Final     Hematocrit   Date Value Ref Range Status   12/01/2023 37.4 34.8 - 46.1 % Final     MCV   Date Value Ref Range Status   12/01/2023 94 82 - 98 fL Final     Platelets   Date Value Ref Range Status   12/01/2023 272 149 - 390 Thousands/uL Final     RBC   Date Value Ref Range Status   12/01/2023 4.00 3.81 - 5.12 Million/uL Final     MCH   Date Value Ref Range Status   12/01/2023 30.3 26.8 - 34.3 pg Final     MCHC   Date Value Ref Range Status   12/01/2023 32.4 31.4 -  "37.4 g/dL Final     RDW   Date Value Ref Range Status   12/01/2023 12.3 11.6 - 15.1 % Final     MPV   Date Value Ref Range Status   12/01/2023 9.2 8.9 - 12.7 fL Final     nRBC   Date Value Ref Range Status   12/01/2023 0 /100 WBCs Final       CMP:  Potassium   Date Value Ref Range Status   10/12/2023 3.6 3.5 - 5.2 mmol/L Final     Chloride   Date Value Ref Range Status   10/12/2023 98 (L) 100 - 109 mmol/L Final     Carbon Dioxide   Date Value Ref Range Status   10/12/2023 23 21 - 31 mmol/L Final     BUN   Date Value Ref Range Status   10/12/2023 12 7 - 25 mg/dL Final     Creatinine   Date Value Ref Range Status   10/12/2023 0.87 0.40 - 1.10 mg/dL Final     Calcium   Date Value Ref Range Status   10/12/2023 8.8 8.5 - 10.1 mg/dL Final     AST   Date Value Ref Range Status   09/12/2023 15 13 - 39 U/L Final     ALT   Date Value Ref Range Status   09/12/2023 12 7 - 52 U/L Final     Comment:     Specimen collection should occur prior to Sulfasalazine administration due to the potential for falsely depressed results.      Alkaline Phosphatase   Date Value Ref Range Status   09/12/2023 63 34 - 104 U/L Final     eGFRcr   Date Value Ref Range Status   10/12/2023 70 >59 Final        Magnesium:   No results found for: \"MG\", \"MAGNESIUM\"     A1C:  No results found for: \"HGBA1C\"     TSH:  TSH 3RD GENERATON   Date Value Ref Range Status   11/17/2023 1.748 0.450 - 4.500 uIU/mL Final     Comment:     The recommended reference ranges for TSH during pregnancy are as follows:   First trimester 0.100 to 2.500 uIU/mL   Second trimester  0.200 to 3.000 uIU/mL   Third trimester 0.300 to 3.000 uIU/m    Note: Normal ranges may not apply to patients who are transgender, non-binary, or whose legal sex, sex at birth, and gender identity differ.  Adult TSH (3rd generation) reference range follows the recommended guidelines of the American [employer] Association, January, 2020.        PT/INR:  No results found for: \"PROTIME\", \"INR\"    Lipid " "Panel:  Cholesterol   Date Value Ref Range Status   2023 178 See Comment mg/dL Final     Comment:     Cholesterol:         Pediatric <18 Years        Desirable          <170 mg/dL      Borderline High    170-199 mg/dL      High               >=200 mg/dL        Adult >=18 Years            Desirable        <200 mg/dL      Borderline High  200-239 mg/dL      High             >239 mg/dL       Triglycerides   Date Value Ref Range Status   2023 109 See Comment mg/dL Final     Comment:     Triglyceride:     0-9Y            <75mg/dL     10Y-17Y         <90 mg/dL       >=18Y     Normal          <150 mg/dL     Borderline High 150-199 mg/dL     High            200-499 mg/dL        Very High       >499 mg/dL    Specimen collection should occur prior to Metamizole administration due to the potential for falsely depressed results.     HDL, Direct   Date Value Ref Range Status   2023 70 >=50 mg/dL Final     Non-HDL-Chol (CHOL-HDL)   Date Value Ref Range Status   2023 108 mg/dl Final       Troponin:  No results found for: \"TROPONINI\"      Imaging:    [id number]        ALEX:  No results found for this or any previous visit.      Echocardiogram:  Results for orders placed during the hospital encounter of 23    Echo complete w/ contrast if indicated    Interpretation Summary  •  Left Ventricle: Left ventricular cavity size is normal. Wall thickness is normal. The left ventricular ejection fraction is 60%. Systolic function is normal. Wall motion is normal. Diastolic function is normal for age.  •  Right Ventricle: Right ventricular cavity size is normal. Systolic function is normal.      Stress Test:   No results found for this or any previous visit.      Cardiac Catheterization:  No results found for this or any previous visit.      HOLTER MONITOR: 24 HOUR/48 HOUR MONITORS  Results for orders placed during the hospital encounter of 23    Holter monitor    Interpretation Summary  INDICATIONS: " Palpitations    DESCRIPTION OF FINDINGS:  The patient was monitored for a total of 48 hours.  The patient was predominantly noted to be in sinus rhythm throughout the study.  The average heart rate was 71 beats per minute.  The heart rate ranged from a low of 45 beats per minute in sinus bradycardia at 8:19 AM to a maximum of 171 beats per minute in atrial fibrillation at 8:20 PM.    Ventricular ectopic activity consisted of 43 PVCs.  There was no sustained or nonsustained ventricular tachycardia.    Supraventricular ectopic activity consisted of 5251 PACs (2.6% of total beats).  Atrial fibrillation is present at an 8% burden  There were no significant pauses. The longest R-R interval was 2.1 seconds.  There was no evidence of advanced degree heart block.    The accompanying patient diary notes symptoms of shortness of breath. Correlation with the tracings at these times reveals rapid A-fib.    Impression  Predominantly sinus rhythm throughout.  Episodes of atrial fibrillation with rapid ventricular response total burden 8%.  These seem to correlate with symptoms of shortness of breath.  Rare PACs and PVCs      AMB Extended Holter Monitor: Zio XT/AT or BioTel  Results for orders placed in visit on 01/10/24    AMB extended holter monitor    Zio XT  12/14/2023 - 12/28/2023   Nader Benedict MD  1/10/2024  4:13 PM EST       Please notify pt there were some abnormal findings on her ziopatch. Nothing emergent.     Please ask her to schedule with the EP team of Afib specialists to further discuss her options. She should continue her current regimen including her AC now.        Thanks!     - Nader Benedict MD  1/10/2024  4:12 PM EST       Impression:     Patient had a min HR of 38 bpm, max HR of 235 bpm, and avg HR of 70 bpm.  Predominant underlying rhythm was Sinus Rhythm. 871 Supraventricular  Tachycardia runs occurred, the run with the fastest interval lasting 5 beats with a  max rate of 235 bpm, the longest lasting 11.7  secs with an avg rate of 177 bpm.  Atrial Fibrillation occurred (1% burden), ranging from  bpm (avg of 140 bpm),  the longest lasting 35 mins 16 secs with an avg rate of 147 bpm.     1 Pause occurred lasting 3.5 secs (17 bpm).     Supraventricular Tachycardia and Atrial Fibrillation were  detected within +/- 45 seconds of symptomatic patient event(s). Isolated SVEs were  frequent (5.1%, 77057), SVE Couplets were rare (<1.0%, 4487), and SVE Triplets  were rare (<1.0%, 1600). Isolated VEs were rare (<1.0%, 4359), VE Couplets were  rare (<1.0%, 86), and VE Triplets were rare (<1.0%, 1). MD notification criteria for  Rapid Atrial Fibrillation met - report posted prior to notification per account request  (LDG).     The recording showed rapid AF as described above. 1 conversion pause noted.     Patient reported symptoms correlated with Afib, conversion pause, NSR and sinus bradycardia.     I reviewed all strips personally.                           DEVICE CHECK:     No results found for this or any previous visit.         Code Status: [unfilled]  Advance Directive and Living Will:      Power of :    POLST:      Counseling / Coordination of Care  Detailed discussion done with regards to management of [id number]-[id number] medication, ablation, pacemaker    Patient is going to discuss at home and then make a final decision      Huang Kim MD

## 2024-03-06 DIAGNOSIS — I48.91 ATRIAL FIBRILLATION WITH RVR (HCC): ICD-10-CM

## 2024-03-13 ENCOUNTER — TELEPHONE (OUTPATIENT)
Dept: CARDIOLOGY CLINIC | Facility: CLINIC | Age: 74
End: 2024-03-13

## 2024-03-13 DIAGNOSIS — I48.91 ATRIAL FIBRILLATION WITH RVR (HCC): Primary | ICD-10-CM

## 2024-03-13 DIAGNOSIS — I48.0 PAROXYSMAL ATRIAL FIBRILLATION (HCC): ICD-10-CM

## 2024-03-13 NOTE — TELEPHONE ENCOUNTER
Pt called requesting to speak with Parvin regarding her OV with Dr. Kim on 03/04/24 and her decision regarding medication therapy vs ablation vs pacemaker. She is aware she is currently not in the office today.

## 2024-03-14 NOTE — TELEPHONE ENCOUNTER
Spoke to pt. She has some questions. She was asking if the stress test would still be required to be completed prior to the ablation? She's scheduled in April for the stress test and knows it's definitely required prior to med start. She is also asking if med therapy would be required after ablation. Can someone please call her to discuss all her questions and concerns? She will then let you know how she would like to proceed.    Thanks

## 2024-03-18 ENCOUNTER — PREP FOR PROCEDURE (OUTPATIENT)
Dept: CARDIOLOGY CLINIC | Facility: CLINIC | Age: 74
End: 2024-03-18

## 2024-03-18 ENCOUNTER — PATIENT MESSAGE (OUTPATIENT)
Dept: CARDIOLOGY CLINIC | Facility: CLINIC | Age: 74
End: 2024-03-18

## 2024-03-18 DIAGNOSIS — I48.91 ATRIAL FIBRILLATION WITH RVR (HCC): Primary | ICD-10-CM

## 2024-03-18 NOTE — TELEPHONE ENCOUNTER
Patient scheduled for ALXE/A fib at [facility] on  4/19/2024  with Dr Kim..    Patient aware of general instructions, labs test required.     Meds holds: HCTZ to hold in the morning of the procedure.    Dr Kim, can you please advise COLETTE holds prior to her procedure.  Thank you.

## 2024-04-02 ENCOUNTER — APPOINTMENT (OUTPATIENT)
Dept: LAB | Facility: CLINIC | Age: 74
End: 2024-04-02
Payer: COMMERCIAL

## 2024-04-02 DIAGNOSIS — I48.91 ATRIAL FIBRILLATION WITH RVR (HCC): ICD-10-CM

## 2024-04-02 LAB
ALBUMIN SERPL BCP-MCNC: 4.5 G/DL (ref 3.5–5)
ALP SERPL-CCNC: 62 U/L (ref 34–104)
ALT SERPL W P-5'-P-CCNC: 11 U/L (ref 7–52)
ANION GAP SERPL CALCULATED.3IONS-SCNC: 8 MMOL/L (ref 4–13)
AST SERPL W P-5'-P-CCNC: 14 U/L (ref 13–39)
BASOPHILS # BLD AUTO: 0.05 THOUSANDS/ÂΜL (ref 0–0.1)
BASOPHILS NFR BLD AUTO: 1 % (ref 0–1)
BILIRUB SERPL-MCNC: 0.59 MG/DL (ref 0.2–1)
BUN SERPL-MCNC: 18 MG/DL (ref 5–25)
CALCIUM SERPL-MCNC: 9.8 MG/DL (ref 8.4–10.2)
CHLORIDE SERPL-SCNC: 104 MMOL/L (ref 96–108)
CO2 SERPL-SCNC: 30 MMOL/L (ref 21–32)
CREAT SERPL-MCNC: 0.94 MG/DL (ref 0.6–1.3)
EOSINOPHIL # BLD AUTO: 0.31 THOUSAND/ÂΜL (ref 0–0.61)
EOSINOPHIL NFR BLD AUTO: 5 % (ref 0–6)
ERYTHROCYTE [DISTWIDTH] IN BLOOD BY AUTOMATED COUNT: 11.9 % (ref 11.6–15.1)
GFR SERPL CREATININE-BSD FRML MDRD: 60 ML/MIN/1.73SQ M
GLUCOSE P FAST SERPL-MCNC: 94 MG/DL (ref 65–99)
HCT VFR BLD AUTO: 43.4 % (ref 34.8–46.1)
HGB BLD-MCNC: 14.3 G/DL (ref 11.5–15.4)
IMM GRANULOCYTES # BLD AUTO: 0.01 THOUSAND/UL (ref 0–0.2)
IMM GRANULOCYTES NFR BLD AUTO: 0 % (ref 0–2)
LYMPHOCYTES # BLD AUTO: 1.43 THOUSANDS/ÂΜL (ref 0.6–4.47)
LYMPHOCYTES NFR BLD AUTO: 21 % (ref 14–44)
MCH RBC QN AUTO: 30.2 PG (ref 26.8–34.3)
MCHC RBC AUTO-ENTMCNC: 32.9 G/DL (ref 31.4–37.4)
MCV RBC AUTO: 92 FL (ref 82–98)
MONOCYTES # BLD AUTO: 0.79 THOUSAND/ÂΜL (ref 0.17–1.22)
MONOCYTES NFR BLD AUTO: 12 % (ref 4–12)
NEUTROPHILS # BLD AUTO: 4.12 THOUSANDS/ÂΜL (ref 1.85–7.62)
NEUTS SEG NFR BLD AUTO: 61 % (ref 43–75)
NRBC BLD AUTO-RTO: 0 /100 WBCS
PLATELET # BLD AUTO: 286 THOUSANDS/UL (ref 149–390)
PMV BLD AUTO: 10 FL (ref 8.9–12.7)
POTASSIUM SERPL-SCNC: 4.1 MMOL/L (ref 3.5–5.3)
PROT SERPL-MCNC: 7.2 G/DL (ref 6.4–8.4)
RBC # BLD AUTO: 4.74 MILLION/UL (ref 3.81–5.12)
SODIUM SERPL-SCNC: 142 MMOL/L (ref 135–147)
WBC # BLD AUTO: 6.71 THOUSAND/UL (ref 4.31–10.16)

## 2024-04-02 PROCEDURE — 85025 COMPLETE CBC W/AUTO DIFF WBC: CPT

## 2024-04-10 ENCOUNTER — HOSPITAL ENCOUNTER (OUTPATIENT)
Dept: NON INVASIVE DIAGNOSTICS | Facility: HOSPITAL | Age: 74
Discharge: HOME | End: 2024-04-10
Attending: INTERNAL MEDICINE

## 2024-04-11 DIAGNOSIS — R32 URINARY INCONTINENCE, UNSPECIFIED TYPE: ICD-10-CM

## 2024-04-11 RX ORDER — MIRABEGRON 50 MG/1
50 TABLET, FILM COATED, EXTENDED RELEASE ORAL DAILY
Qty: 90 TABLET | Refills: 1 | Status: SHIPPED | OUTPATIENT
Start: 2024-04-11

## 2024-04-18 ENCOUNTER — ANESTHESIA EVENT (OUTPATIENT)
Dept: NON INVASIVE DIAGNOSTICS | Facility: HOSPITAL | Age: 74
End: 2024-04-18
Payer: COMMERCIAL

## 2024-04-19 ENCOUNTER — ANESTHESIA (OUTPATIENT)
Dept: NON INVASIVE DIAGNOSTICS | Facility: HOSPITAL | Age: 74
End: 2024-04-19
Payer: COMMERCIAL

## 2024-04-19 ENCOUNTER — APPOINTMENT (OUTPATIENT)
Dept: NON INVASIVE DIAGNOSTICS | Facility: HOSPITAL | Age: 74
End: 2024-04-19
Attending: INTERNAL MEDICINE
Payer: COMMERCIAL

## 2024-04-19 ENCOUNTER — HOSPITAL ENCOUNTER (OUTPATIENT)
Facility: HOSPITAL | Age: 74
Setting detail: OUTPATIENT SURGERY
Discharge: HOME | End: 2024-04-20
Attending: INTERNAL MEDICINE | Admitting: INTERNAL MEDICINE
Payer: COMMERCIAL

## 2024-04-19 DIAGNOSIS — I48.0 PAROXYSMAL ATRIAL FIBRILLATION (HCC): ICD-10-CM

## 2024-04-19 DIAGNOSIS — I48.91 ATRIAL FIBRILLATION WITH RVR (HCC): ICD-10-CM

## 2024-04-19 PROBLEM — Z86.79 S/P ABLATION OF ATRIAL FIBRILLATION: Status: ACTIVE | Noted: 2024-04-19

## 2024-04-19 PROBLEM — Z98.890 S/P ABLATION OF ATRIAL FIBRILLATION: Status: ACTIVE | Noted: 2024-04-19

## 2024-04-19 LAB
ANION GAP SERPL CALCULATED.3IONS-SCNC: 10 MMOL/L (ref 4–13)
ATRIAL RATE: 60 BPM
ATRIAL RATE: 62 BPM
ATRIAL RATE: 64 BPM
BUN SERPL-MCNC: 13 MG/DL (ref 5–25)
CALCIUM SERPL-MCNC: 9.5 MG/DL (ref 8.4–10.2)
CHLORIDE SERPL-SCNC: 100 MMOL/L (ref 96–108)
CO2 SERPL-SCNC: 29 MMOL/L (ref 21–32)
CREAT SERPL-MCNC: 0.77 MG/DL (ref 0.6–1.3)
ERYTHROCYTE [DISTWIDTH] IN BLOOD BY AUTOMATED COUNT: 11.7 % (ref 11.6–15.1)
GFR SERPL CREATININE-BSD FRML MDRD: 76 ML/MIN/1.73SQ M
GLUCOSE P FAST SERPL-MCNC: 100 MG/DL (ref 65–99)
GLUCOSE SERPL-MCNC: 100 MG/DL (ref 65–140)
HCT VFR BLD AUTO: 40.6 % (ref 34.8–46.1)
HGB BLD-MCNC: 13.9 G/DL (ref 11.5–15.4)
MCH RBC QN AUTO: 31.1 PG (ref 26.8–34.3)
MCHC RBC AUTO-ENTMCNC: 34.2 G/DL (ref 31.4–37.4)
MCV RBC AUTO: 91 FL (ref 82–98)
P AXIS: 67 DEGREES
P AXIS: 74 DEGREES
P AXIS: 78 DEGREES
PLATELET # BLD AUTO: 261 THOUSANDS/UL (ref 149–390)
PMV BLD AUTO: 9.4 FL (ref 8.9–12.7)
POTASSIUM SERPL-SCNC: 3.5 MMOL/L (ref 3.5–5.3)
PR INTERVAL: 184 MS
PR INTERVAL: 186 MS
PR INTERVAL: 192 MS
QRS AXIS: 31 DEGREES
QRS AXIS: 51 DEGREES
QRS AXIS: 8 DEGREES
QRSD INTERVAL: 100 MS
QRSD INTERVAL: 104 MS
QRSD INTERVAL: 108 MS
QT INTERVAL: 418 MS
QT INTERVAL: 448 MS
QT INTERVAL: 467 MS
QTC INTERVAL: 424 MS
QTC INTERVAL: 462 MS
QTC INTERVAL: 467 MS
RBC # BLD AUTO: 4.47 MILLION/UL (ref 3.81–5.12)
SL CV LV EF: 60
SODIUM SERPL-SCNC: 139 MMOL/L (ref 135–147)
T WAVE AXIS: 26 DEGREES
T WAVE AXIS: 28 DEGREES
T WAVE AXIS: 51 DEGREES
VENTRICULAR RATE: 60 BPM
VENTRICULAR RATE: 62 BPM
VENTRICULAR RATE: 64 BPM
WBC # BLD AUTO: 5.32 THOUSAND/UL (ref 4.31–10.16)

## 2024-04-19 PROCEDURE — 93656 COMPRE EP EVAL ABLTJ ATR FIB: CPT | Performed by: INTERNAL MEDICINE

## 2024-04-19 PROCEDURE — C1894 INTRO/SHEATH, NON-LASER: HCPCS | Performed by: INTERNAL MEDICINE

## 2024-04-19 PROCEDURE — 93657 TX L/R ATRIAL FIB ADDL: CPT | Performed by: INTERNAL MEDICINE

## 2024-04-19 PROCEDURE — C9113 INJ PANTOPRAZOLE SODIUM, VIA: HCPCS | Performed by: PHYSICIAN ASSISTANT

## 2024-04-19 PROCEDURE — 76937 US GUIDE VASCULAR ACCESS: CPT | Performed by: INTERNAL MEDICINE

## 2024-04-19 PROCEDURE — 93010 ELECTROCARDIOGRAM REPORT: CPT | Performed by: INTERNAL MEDICINE

## 2024-04-19 PROCEDURE — NC001 PR NO CHARGE: Performed by: PHYSICIAN ASSISTANT

## 2024-04-19 PROCEDURE — C1733 CATH, EP, OTHR THAN COOL-TIP: HCPCS | Performed by: INTERNAL MEDICINE

## 2024-04-19 PROCEDURE — C1730 CATH, EP, 19 OR FEW ELECT: HCPCS | Performed by: INTERNAL MEDICINE

## 2024-04-19 PROCEDURE — 80048 BASIC METABOLIC PNL TOTAL CA: CPT | Performed by: PHYSICIAN ASSISTANT

## 2024-04-19 PROCEDURE — 93005 ELECTROCARDIOGRAM TRACING: CPT

## 2024-04-19 PROCEDURE — C1769 GUIDE WIRE: HCPCS | Performed by: INTERNAL MEDICINE

## 2024-04-19 PROCEDURE — C1893 INTRO/SHEATH, FIXED,NON-PEEL: HCPCS | Performed by: INTERNAL MEDICINE

## 2024-04-19 PROCEDURE — 93312 ECHO TRANSESOPHAGEAL: CPT

## 2024-04-19 PROCEDURE — 93320 DOPPLER ECHO COMPLETE: CPT | Performed by: INTERNAL MEDICINE

## 2024-04-19 PROCEDURE — 93325 DOPPLER ECHO COLOR FLOW MAPG: CPT | Performed by: INTERNAL MEDICINE

## 2024-04-19 PROCEDURE — 85347 COAGULATION TIME ACTIVATED: CPT

## 2024-04-19 PROCEDURE — 85027 COMPLETE CBC AUTOMATED: CPT | Performed by: PHYSICIAN ASSISTANT

## 2024-04-19 PROCEDURE — C1759 CATH, INTRA ECHOCARDIOGRAPHY: HCPCS | Performed by: INTERNAL MEDICINE

## 2024-04-19 PROCEDURE — 93312 ECHO TRANSESOPHAGEAL: CPT | Performed by: INTERNAL MEDICINE

## 2024-04-19 RX ORDER — OXYBUTYNIN CHLORIDE 10 MG/1
10 TABLET, EXTENDED RELEASE ORAL DAILY
Status: DISCONTINUED | OUTPATIENT
Start: 2024-04-19 | End: 2024-04-20 | Stop reason: HOSPADM

## 2024-04-19 RX ORDER — PRAVASTATIN SODIUM 20 MG
20 TABLET ORAL
Status: DISCONTINUED | OUTPATIENT
Start: 2024-04-19 | End: 2024-04-20 | Stop reason: HOSPADM

## 2024-04-19 RX ORDER — PANTOPRAZOLE SODIUM 40 MG/10ML
40 INJECTION, POWDER, LYOPHILIZED, FOR SOLUTION INTRAVENOUS ONCE
Status: COMPLETED | OUTPATIENT
Start: 2024-04-19 | End: 2024-04-19

## 2024-04-19 RX ORDER — SODIUM CHLORIDE 9 MG/ML
20 INJECTION, SOLUTION INTRAVENOUS ONCE
Status: DISCONTINUED | OUTPATIENT
Start: 2024-04-19 | End: 2024-04-19 | Stop reason: HOSPADM

## 2024-04-19 RX ORDER — LOSARTAN POTASSIUM 50 MG/1
50 TABLET ORAL 2 TIMES DAILY
Status: DISCONTINUED | OUTPATIENT
Start: 2024-04-19 | End: 2024-04-20 | Stop reason: HOSPADM

## 2024-04-19 RX ORDER — FENTANYL CITRATE/PF 50 MCG/ML
12.5 SYRINGE (ML) INJECTION
Status: DISCONTINUED | OUTPATIENT
Start: 2024-04-19 | End: 2024-04-19 | Stop reason: HOSPADM

## 2024-04-19 RX ORDER — CEFAZOLIN SODIUM 1 G/50ML
SOLUTION INTRAVENOUS AS NEEDED
Status: DISCONTINUED | OUTPATIENT
Start: 2024-04-19 | End: 2024-04-19

## 2024-04-19 RX ORDER — DILTIAZEM HYDROCHLORIDE 120 MG/1
120 CAPSULE, EXTENDED RELEASE ORAL DAILY
Status: DISCONTINUED | OUTPATIENT
Start: 2024-04-19 | End: 2024-04-19 | Stop reason: SDUPTHER

## 2024-04-19 RX ORDER — HEPARIN SODIUM 1000 [USP'U]/ML
INJECTION, SOLUTION INTRAVENOUS; SUBCUTANEOUS CODE/TRAUMA/SEDATION MEDICATION
Status: DISCONTINUED | OUTPATIENT
Start: 2024-04-19 | End: 2024-04-19 | Stop reason: HOSPADM

## 2024-04-19 RX ORDER — GLYCOPYRROLATE 0.2 MG/ML
INJECTION INTRAMUSCULAR; INTRAVENOUS AS NEEDED
Status: DISCONTINUED | OUTPATIENT
Start: 2024-04-19 | End: 2024-04-19

## 2024-04-19 RX ORDER — SODIUM CHLORIDE 9 MG/ML
20 INJECTION, SOLUTION INTRAVENOUS CONTINUOUS
Status: DISCONTINUED | OUTPATIENT
Start: 2024-04-19 | End: 2024-04-19

## 2024-04-19 RX ORDER — PROTAMINE SULFATE 10 MG/ML
INJECTION, SOLUTION INTRAVENOUS AS NEEDED
Status: DISCONTINUED | OUTPATIENT
Start: 2024-04-19 | End: 2024-04-19

## 2024-04-19 RX ORDER — DILTIAZEM HYDROCHLORIDE 120 MG/1
120 CAPSULE, COATED, EXTENDED RELEASE ORAL DAILY
Status: DISCONTINUED | OUTPATIENT
Start: 2024-04-19 | End: 2024-04-20 | Stop reason: HOSPADM

## 2024-04-19 RX ORDER — ACETAMINOPHEN 325 MG/1
650 TABLET ORAL EVERY 4 HOURS PRN
Status: DISCONTINUED | OUTPATIENT
Start: 2024-04-19 | End: 2024-04-20 | Stop reason: HOSPADM

## 2024-04-19 RX ORDER — HEPARIN SODIUM 10000 [USP'U]/100ML
INJECTION, SOLUTION INTRAVENOUS
Status: DISCONTINUED | OUTPATIENT
Start: 2024-04-19 | End: 2024-04-19 | Stop reason: HOSPADM

## 2024-04-19 RX ORDER — PANTOPRAZOLE SODIUM 40 MG/1
40 TABLET, DELAYED RELEASE ORAL
Status: DISCONTINUED | OUTPATIENT
Start: 2024-04-19 | End: 2024-04-20 | Stop reason: HOSPADM

## 2024-04-19 RX ORDER — LIDOCAINE HYDROCHLORIDE 10 MG/ML
INJECTION, SOLUTION EPIDURAL; INFILTRATION; INTRACAUDAL; PERINEURAL AS NEEDED
Status: DISCONTINUED | OUTPATIENT
Start: 2024-04-19 | End: 2024-04-19

## 2024-04-19 RX ORDER — SODIUM CHLORIDE 9 MG/ML
INJECTION, SOLUTION INTRAVENOUS CONTINUOUS PRN
Status: DISCONTINUED | OUTPATIENT
Start: 2024-04-19 | End: 2024-04-19

## 2024-04-19 RX ORDER — NEOSTIGMINE METHYLSULFATE 1 MG/ML
INJECTION INTRAVENOUS AS NEEDED
Status: DISCONTINUED | OUTPATIENT
Start: 2024-04-19 | End: 2024-04-19

## 2024-04-19 RX ORDER — HYDROCHLOROTHIAZIDE 25 MG/1
25 TABLET ORAL DAILY
Status: DISCONTINUED | OUTPATIENT
Start: 2024-04-20 | End: 2024-04-20 | Stop reason: HOSPADM

## 2024-04-19 RX ORDER — MIDAZOLAM HYDROCHLORIDE 2 MG/2ML
INJECTION, SOLUTION INTRAMUSCULAR; INTRAVENOUS AS NEEDED
Status: DISCONTINUED | OUTPATIENT
Start: 2024-04-19 | End: 2024-04-19

## 2024-04-19 RX ORDER — FENTANYL CITRATE 50 UG/ML
INJECTION, SOLUTION INTRAMUSCULAR; INTRAVENOUS AS NEEDED
Status: DISCONTINUED | OUTPATIENT
Start: 2024-04-19 | End: 2024-04-19

## 2024-04-19 RX ORDER — ONDANSETRON 2 MG/ML
INJECTION INTRAMUSCULAR; INTRAVENOUS AS NEEDED
Status: DISCONTINUED | OUTPATIENT
Start: 2024-04-19 | End: 2024-04-19

## 2024-04-19 RX ORDER — PROPOFOL 10 MG/ML
INJECTION, EMULSION INTRAVENOUS AS NEEDED
Status: DISCONTINUED | OUTPATIENT
Start: 2024-04-19 | End: 2024-04-19

## 2024-04-19 RX ORDER — HYDROMORPHONE HCL IN WATER/PF 6 MG/30 ML
0.2 PATIENT CONTROLLED ANALGESIA SYRINGE INTRAVENOUS
Status: DISCONTINUED | OUTPATIENT
Start: 2024-04-19 | End: 2024-04-19 | Stop reason: HOSPADM

## 2024-04-19 RX ORDER — ONDANSETRON 2 MG/ML
4 INJECTION INTRAMUSCULAR; INTRAVENOUS ONCE AS NEEDED
Status: DISCONTINUED | OUTPATIENT
Start: 2024-04-19 | End: 2024-04-19 | Stop reason: HOSPADM

## 2024-04-19 RX ORDER — DEXAMETHASONE SODIUM PHOSPHATE 10 MG/ML
INJECTION, SOLUTION INTRAMUSCULAR; INTRAVENOUS AS NEEDED
Status: DISCONTINUED | OUTPATIENT
Start: 2024-04-19 | End: 2024-04-19

## 2024-04-19 RX ORDER — ROCURONIUM BROMIDE 10 MG/ML
INJECTION, SOLUTION INTRAVENOUS AS NEEDED
Status: DISCONTINUED | OUTPATIENT
Start: 2024-04-19 | End: 2024-04-19

## 2024-04-19 RX ADMIN — FENTANYL CITRATE 12.5 MCG: 50 INJECTION INTRAMUSCULAR; INTRAVENOUS at 12:16

## 2024-04-19 RX ADMIN — SODIUM CHLORIDE 4 MCG: 9 INJECTION, SOLUTION INTRAVENOUS at 10:41

## 2024-04-19 RX ADMIN — FENTANYL CITRATE 25 MCG: 50 INJECTION INTRAMUSCULAR; INTRAVENOUS at 08:35

## 2024-04-19 RX ADMIN — DEXAMETHASONE SODIUM PHOSPHATE 10 MG: 10 INJECTION, SOLUTION INTRAMUSCULAR; INTRAVENOUS at 09:44

## 2024-04-19 RX ADMIN — PROPOFOL 30 MG: 10 INJECTION, EMULSION INTRAVENOUS at 10:11

## 2024-04-19 RX ADMIN — GLYCOPYRROLATE 0.2 MG: 0.2 INJECTION, SOLUTION INTRAMUSCULAR; INTRAVENOUS at 11:39

## 2024-04-19 RX ADMIN — PROPOFOL 100 MG: 10 INJECTION, EMULSION INTRAVENOUS at 09:56

## 2024-04-19 RX ADMIN — APIXABAN 5 MG: 5 TABLET, FILM COATED ORAL at 17:02

## 2024-04-19 RX ADMIN — PROPOFOL 100 MG: 10 INJECTION, EMULSION INTRAVENOUS at 11:23

## 2024-04-19 RX ADMIN — FENTANYL CITRATE 50 MCG: 50 INJECTION INTRAMUSCULAR; INTRAVENOUS at 11:00

## 2024-04-19 RX ADMIN — PANTOPRAZOLE SODIUM 40 MG: 40 INJECTION, POWDER, FOR SOLUTION INTRAVENOUS at 08:37

## 2024-04-19 RX ADMIN — PANTOPRAZOLE SODIUM 40 MG: 40 TABLET, DELAYED RELEASE ORAL at 17:02

## 2024-04-19 RX ADMIN — SODIUM CHLORIDE: 0.9 INJECTION, SOLUTION INTRAVENOUS at 07:56

## 2024-04-19 RX ADMIN — PROPOFOL 30 MG: 10 INJECTION, EMULSION INTRAVENOUS at 10:12

## 2024-04-19 RX ADMIN — PROPOFOL 30 MG: 10 INJECTION, EMULSION INTRAVENOUS at 10:13

## 2024-04-19 RX ADMIN — DILTIAZEM HYDROCHLORIDE 120 MG: 120 CAPSULE, COATED, EXTENDED RELEASE ORAL at 17:02

## 2024-04-19 RX ADMIN — SODIUM CHLORIDE 4 MCG: 9 INJECTION, SOLUTION INTRAVENOUS at 10:13

## 2024-04-19 RX ADMIN — PROPOFOL 50 MG: 10 INJECTION, EMULSION INTRAVENOUS at 11:25

## 2024-04-19 RX ADMIN — NEOSTIGMINE METHYLSULFATE 1 MG: 1 INJECTION INTRAVENOUS at 11:39

## 2024-04-19 RX ADMIN — PRAVASTATIN SODIUM 20 MG: 20 TABLET ORAL at 17:03

## 2024-04-19 RX ADMIN — ROCURONIUM BROMIDE 50 MG: 10 INJECTION, SOLUTION INTRAVENOUS at 08:21

## 2024-04-19 RX ADMIN — CEFAZOLIN SODIUM 1000 MG: 1 SOLUTION INTRAVENOUS at 08:32

## 2024-04-19 RX ADMIN — MIDAZOLAM 2 MG: 1 INJECTION INTRAMUSCULAR; INTRAVENOUS at 08:10

## 2024-04-19 RX ADMIN — SODIUM CHLORIDE 4 MCG: 9 INJECTION, SOLUTION INTRAVENOUS at 09:50

## 2024-04-19 RX ADMIN — LIDOCAINE HYDROCHLORIDE 50 MG: 10 INJECTION, SOLUTION EPIDURAL; INFILTRATION; INTRACAUDAL; PERINEURAL at 08:23

## 2024-04-19 RX ADMIN — OXYBUTYNIN 10 MG: 10 TABLET, FILM COATED, EXTENDED RELEASE ORAL at 17:02

## 2024-04-19 RX ADMIN — PROTAMINE SULFATE 70 MG: 10 INJECTION, SOLUTION INTRAVENOUS at 11:31

## 2024-04-19 RX ADMIN — PROPOFOL 100 MG: 10 INJECTION, EMULSION INTRAVENOUS at 11:24

## 2024-04-19 RX ADMIN — SODIUM CHLORIDE 4 MCG: 9 INJECTION, SOLUTION INTRAVENOUS at 10:10

## 2024-04-19 RX ADMIN — ACETAMINOPHEN 650 MG: 325 TABLET, FILM COATED ORAL at 17:02

## 2024-04-19 RX ADMIN — PHENYLEPHRINE HYDROCHLORIDE 30 MCG/MIN: 10 INJECTION INTRAVENOUS at 09:10

## 2024-04-19 RX ADMIN — PROPOFOL 30 MG: 10 INJECTION, EMULSION INTRAVENOUS at 08:39

## 2024-04-19 RX ADMIN — ONDANSETRON 4 MG: 2 INJECTION INTRAMUSCULAR; INTRAVENOUS at 11:15

## 2024-04-19 RX ADMIN — SODIUM CHLORIDE: 0.9 INJECTION, SOLUTION INTRAVENOUS at 11:13

## 2024-04-19 RX ADMIN — SODIUM CHLORIDE 4 MCG: 9 INJECTION, SOLUTION INTRAVENOUS at 10:34

## 2024-04-19 RX ADMIN — FENTANYL CITRATE 25 MCG: 50 INJECTION INTRAMUSCULAR; INTRAVENOUS at 09:36

## 2024-04-19 NOTE — H&P
H&P Exam - Electrophysiology - Cardiology   Amairani Vergara 74 y.o. female MRN: [id number]  Unit/Bed#: BE CATH LAB ROOM Encounter: [id number]    Assessment/Plan     Assessment:  Symptomatic paroxysmal atrial fibrillation  -- AF burden 8% on Holter, conversion pauses up to 3.5 seconds  -- Eliquis for stroke prevention, [id number] = 3 (HTN, age, female)  -- diltiazem for rate control (intolerant to metoprolol)  HTN  HLD    Plan:  -- NPO for Afib ablation today  -- presents in sinus rhythm  -- last dose of Eliquis was yesterday  -- bedrest x 4 hours  -- admit to med/surg telemetry  -- resume Eliquis post procedure  -- PPI daily x 30 days  -- monitor on tele overnight  -- D/C home tomorrow       History of Present Illness   HPI:  Amairani Vergara is a 74 y.o. female with symptomatic paroxysmal atrial fibrillation on Eliquis, hypertension, hyperlipidemia who presents to [facility] for A-fib ablation.    Her A-fib was diagnosed when she presented for knee surgery.  She reverted to sinus rhythm on her own.  She has been anticoagulated with Eliquis.  Her [id number] is 3 (HTN, age, female).  She wore a Holter monitor in August 2023 that showed an AF burden of 8%.  He experiences chest tightness, palpitations, and shortness of breath and A-fib.  She was initially started on metoprolol but did not tolerate this.  She is now on diltiazem for rate control.    She wore a Zio patch in December 2023 that showed paroxysmal A-fib with RVR with rates greater than 200 bpm, and conversion pauses lasting up to 3.5 seconds.    She was evaluated by Dr. Kim in March 2024.  Treatment options were discussed and she elected to proceed with catheter ablation of A-fib.    Today, she is feeling well.  She has been compliant with Eliquis with no missed doses.  Her last episode of A-fib was a week ago.  Her last dose of Eliquis was yesterday morning.  She is n.p.o. for the procedure today.    EKG: sinus rhythm    Echo 1/25/2023:    Left Ventricle: Left  ventricular cavity size is normal. Wall thickness is normal. The left ventricular ejection fraction is 60%. Systolic function is normal. Wall motion is normal. Diastolic function is normal for age.    Right Ventricle: Right ventricular cavity size is normal. Systolic function is normal.    48 Holter 8/3/2023: Predominantly sinus rhythm throughout. Episodes of atrial fibrillation with rapid ventricular response total burden 8%. These seem to correlate with symptoms of shortness of breath. Rare PACs and PVCs     ZioPatch 12/14/2023 - 12/28/2023: paroxysmal Afib with periods of RVR HR >200 bpm, total AF burden 1%. Conversion pauses lasting up to 3.5 seconds.     Review of Systems   Constitutional: Negative.    HENT: Negative.     Eyes: Negative.    Respiratory: Negative.     Cardiovascular: Negative.    Gastrointestinal: Negative.    Endocrine: Negative.    Genitourinary: Negative.    Musculoskeletal: Negative.    Skin: Negative.    Allergic/Immunologic: Negative.    Neurological: Negative.    Hematological: Negative.    Psychiatric/Behavioral: Negative.       ROS as noted above, otherwise 12 point review of systems was performed and is negative.     Historical Information   Past Medical History:   Diagnosis Date    Arthritis 05/2021    R knee replacement scheduled  01/23    Cataract     bilateral     Hyperlipidemia     Hypertension     Osteopenia     Urinary incontinence      Past Surgical History:   Procedure Laterality Date    CATARACT EXTRACTION Left 01/28/2022    [facility], Dr. Monet    CATARACT EXTRACTION Right 01/14/2022    [facility], Dr. Monet    INCONTINENCE SURGERY      REPLACEMENT TOTAL KNEE Right 10/11/2023    [facility], Dr. Murtaza Barr    TUBAL LIGATION       Family History:   Family History   Problem Relation Age of Onset    Hypertension Mother     No Known Problems Father     Diabetes Maternal Grandmother     No Known Problems Maternal Grandfather     No  "Known Problems Paternal Grandmother     No Known Problems Paternal Grandfather     No Known Problems Sister     No Known Problems Daughter     No Known Problems Son     No Known Problems Daughter     No Known Problems Son     No Known Problems Maternal Aunt     No Known Problems Paternal Aunt     Breast cancer Neg Hx     Breast cancer additional onset Neg Hx     Colon cancer Neg Hx     Endometrial cancer Neg Hx     Ovarian cancer Neg Hx     BRCA 1/2 Neg Hx     BRCA1 Negative Neg Hx     BRCA1 Positive Neg Hx     BRCA2 Negative Neg Hx     BRCA2 Positive Neg Hx        Social History   Social History     Substance and Sexual Activity   Alcohol Use Yes     Social History     Substance and Sexual Activity   Drug Use Never     Social History     Tobacco Use   Smoking Status Former    Current packs/day: 0.00    Average packs/day: 0.3 packs/day for 10.0 years (2.5 ttl pk-yrs)    Types: Cigarettes    Start date: 1975    Quit date: 1985    Years since quittin.4   Smokeless Tobacco Never       Meds/Allergies   all medications and allergies reviewed  Home Medications:   Medications Prior to Admission   Medication    apixaban (Eliquis) 5 mg    diltiazem (DILACOR XR) 120 MG 24 hr capsule    hydrochlorothiazide (MICROZIDE) 12.5 mg capsule    Mirabegron ER (Myrbetriq) 50 MG TB24    pravastatin (PRAVACHOL) 20 mg tablet    clobetasol (TEMOVATE) 0.05 % ointment       Allergies   Allergen Reactions    Metoprolol Hives and Shortness Of Breath     Lightheadedness, cough    Ibandronic Acid Other (See Comments)     Joint pain       Objective   Vitals: Blood pressure 170/74, pulse 60, temperature 98.5 °F (36.9 °C), temperature source Oral, resp. rate 20, height 5' 2\" (1.575 m), weight 69.9 kg (154 lb), SpO2 96%.  Orthostatic Blood Pressures      Flowsheet Row Most Recent Value   Blood Pressure 170/74 filed at 2024 0902              Intake/Output Summary (Last 24 hours) at 2024 0954  Last data filed at 2024 " 0935  Gross per 24 hour   Intake 500 ml   Output --   Net 500 ml       Invasive Devices       Peripheral Intravenous Line  Duration             Peripheral IV 04/19/24 Left Antecubital <1 day    Peripheral IV 04/19/24 Right Antecubital <1 day              Line  Duration             Venous Sheath Other (Comment) Left Femoral <1 day    Venous Sheath Other (Comment) Right Femoral <1 day              Airway  Duration             ETT  Cuffed 7 mm <1 day                    Physical Exam   GEN: NAD, alert and oriented, well appearing  SKIN: dry without significant lesions or rashes  HEENT: NCAT  NECK: No JVD or carotid bruits appreciated  CARDIOVASCULAR: RRR, normal S1, S2 without murmurs, rubs, or gallops appreciated  LUNGS: Clear to auscultation bilaterally without wheezes, rhonchi, or rales  ABDOMEN: Soft, nontender, nondistended  EXTREMITIES/VASCULAR: perfused without clubbing, cyanosis, or edema b/l  PSYCH: Normal mood and affect  NEURO: CN ll-Xll grossly intact    Lab Results: I have personally reviewed pertinent lab results.    Results from last 7 days   Lab Units 04/19/24  0648   WBC Thousand/uL 5.32   HEMOGLOBIN g/dL 13.9   HEMATOCRIT % 40.6   PLATELETS Thousands/uL 261     Results from last 7 days   Lab Units 04/19/24  0648   POTASSIUM mmol/L 3.5   CHLORIDE mmol/L 100   CO2 mmol/L 29   BUN mg/dL 13   CREATININE mg/dL 0.77   CALCIUM mg/dL 9.5

## 2024-04-19 NOTE — ANESTHESIA PREPROCEDURE EVALUATION
Procedure:  Cardiac eps/afib ablation (Chest)    Relevant Problems   CARDIO   (+) Atrial fibrillation (HCC)   (+) Benign essential hypertension   (+) Hyperlipidemia      MUSCULOSKELETAL   (+) Osteoarthritis of right knee        Physical Exam    Airway    Mallampati score: I  TM Distance: >3 FB  Neck ROM: full     Dental        Cardiovascular  Rhythm: regular, Rate: normal, Cardiovascular exam normal    Pulmonary  Pulmonary exam normal     Other Findings  post-pubertal.      Anesthesia Plan  ASA Score- 3     Anesthesia Type- general with ASA Monitors.         Additional Monitors: arterial line.    Airway Plan: ETT.           Plan Factors-Exercise tolerance (METS): >4 METS.    Chart reviewed. EKG reviewed.  Existing labs reviewed.     Patient is not a current smoker.              Induction- intravenous.    Postoperative Plan- Plan for postoperative opioid use. Planned trial extubation    Informed Consent- Anesthetic plan and risks discussed with patient and daughter.  I personally reviewed this patient with the CRNA. Discussed and agreed on the Anesthesia Plan with the CRNA..              K 4.1  Cr 0.94  Hgb 13.9  Plt 261    Npo  No problems with ane  Denies CP  Some SOB climbing stairs in last year  Can walk a mile a few times a week  Denies GERd    Echo 1/23  Interpretation Summary         Left Ventricle: Left ventricular cavity size is normal. Wall thickness is normal. The left ventricular ejection fraction is 60%. Systolic function is normal. Wall motion is normal. Diastolic function is normal for age.    Right Ventricle: Right ventricular cavity size is normal. Systolic function is normal.     Findings    Left Ventricle Left ventricular cavity size is normal. Wall thickness is normal. The left ventricular ejection fraction is 60%. Systolic function is normal.  Wall motion is normal. Diastolic function is normal for age.   Right Ventricle Right ventricular cavity size is normal. Systolic function is normal. Wall  thickness is normal.   Left Atrium The atrium is normal in size.   Right Atrium The atrium is normal in size.   Aortic Valve The aortic valve is trileaflet. The leaflets are not thickened. The leaflets are not calcified. The leaflets exhibit normal mobility. There is no evidence of regurgitation. The aortic valve has no significant stenosis.   Mitral Valve Mitral valve structure is normal. There is trace regurgitation. There is no evidence of stenosis.   Tricuspid Valve Tricuspid valve structure is normal. There is trace regurgitation. There is no evidence of stenosis. The right ventricular systolic pressure is normal. The estimated right ventricular systolic pressure is 26.00 mmHg.   Pulmonic Valve Pulmonic valve structure is normal. There is trace regurgitation. There is no evidence of stenosis.   Ascending Aorta The aortic root is normal in size.   IVC/SVC The right atrial pressure is estimated at 3.0 mmHg. The inferior vena cava is normal in size.   Pericardium There is no pericardial effusion. The pericardium is normal in appearance.       POCT ECG  Order: [id number]   Status: Final result       Visible to patient: Yes (seen)       Next appt: Today at 08:00 AM in Cardiology Imaging ([facility] CATH LAB 3)       Dx: Atrial fibrillation with RVR (HCC)    0 Result Notes        Impression    EKG reviewed by myself, normal sinus rhythm, normal axis, normal progression of R wave,  ms, QTc 440 ms      Specimen Collected: 03/04/24 11:54 Last Resulted: 03/04/24 11:54

## 2024-04-19 NOTE — ANESTHESIA POSTPROCEDURE EVALUATION
Post-Op Assessment Note    CV Status:  Stable  Pain Score: 0    Pain management: adequate       Mental Status:  Sleepy   Hydration Status:  Euvolemic   PONV Controlled:  Controlled   Airway Patency:  Patent     Post Op Vitals Reviewed: Yes    No anethesia notable event occurred.    Staff: CRNA           /62 (04/19/24 1203)    Temp 97.9 °F (36.6 °C) (04/19/24 1203)    Pulse 59 (04/19/24 1203)   Resp 14 (04/19/24 1203)    SpO2 97 % (04/19/24 1203)

## 2024-04-19 NOTE — H&P
"  Discharge Summary - Amairani Vergara 74 y.o. female MRN: [id number]    Unit/Bed#: BE CATH LAB ROOM Encounter: [id number]      Admission Date: 4/19/2024   Discharge Date: 4/20/2024***    Discharge Diagnosis:   Symptomatic paroxysmal atrial fibrillation s/p Afib ablation 4/19/2024 by Dr. Kim  -- AF burden 8% on Holter, conversion pauses up to 3.5 seconds  -- Eliquis for stroke prevention, [id number] = 3 (HTN, age, female)  -- diltiazem for rate control (intolerant to metoprolol)  HTN  HLD    Procedures Performed:     ***    Orders Placed This Encounter   Procedures    Cardiac ep lab eps/ablations     Consultants: none    HPI: Amairani Vergara is a 74 y.o. female with symptomatic paroxysmal atrial fibrillation on Eliquis, hypertension, hyperlipidemia who presents to [facility] for A-fib ablation.     Holter monitor showed 8% Afib burden and Afib with RVR. She had conversion pauses up to 3.5 seconds. She is on Eliquis and diltiazem. Afib ablation was recommended and she elected to proceed.     Hospital Course: Amairani Vergara presented in sinus rhythm. She underwent successful Afib ablation on 4/19/2024 by Dr. Kim. ***      There were no acute issues or events overnight. The following morning she denied all cardiac complaints, including chest pain/pressure, dyspnea, palpitations, dizziness, lightheadedness, or syncope. Her vital signs were reviewed and labs are stable. Telemetry showed ***. Her groins were soft without significant hematoma or recurrent bleeding, and sutures were removed without incident ***.     Discharge instructions were reviewed with the patient in detail. She will follow up with EP in 4-6 weeks. She was stable for discharge home on 4/20/2024. ***    Medication Instructions:  Continue Eliquis 5 mg every 12 hours  Continue diltiazem 120 mg daily  Take Protonix 40 mg once daily for 30 days then stop    Physical exam:  /74   Pulse 60   Temp 98.5 °F (36.9 °C) (Oral)   Resp 20   Ht 5' 2\" (1.575 m)   Wt " 69.9 kg (154 lb)   SpO2 96%   BMI 28.17 kg/m²   GEN: NAD, alert and oriented x 3, well appearing  SKIN: dry without significant lesions or rashes  HEENT: NCAT  NECK: No JVD appreciated  CARDIOVASCULAR: RRR, normal S1, S2 without murmurs, rubs, or gallops appreciated  LUNGS: Clear to auscultation bilaterally without wheezes, rhonchi, or rales  ABDOMEN: Soft, nontender, nondistended  EXTREMITIES/VASCULAR: perfused without clubbing, cyanosis, or LE edema b/l  PSYCH: Normal mood and affect  NEURO: CN ll-Xll grossly intact    Discharge Medications:  See after visit summary for reconciled discharge medications provided to patient and family.    Medications Prior to Admission   Medication    apixaban (Eliquis) 5 mg    diltiazem (DILACOR XR) 120 MG 24 hr capsule    hydrochlorothiazide (MICROZIDE) 12.5 mg capsule    Mirabegron ER (Myrbetriq) 50 MG TB24    pravastatin (PRAVACHOL) 20 mg tablet    clobetasol (TEMOVATE) 0.05 % ointment       [facility] Labs/diagnostics:  CBC with diff:   Results from last 7 days   Lab Units 04/19/24  0648   WBC Thousand/uL 5.32   HEMOGLOBIN g/dL 13.9   HEMATOCRIT % 40.6   MCV fL 91   PLATELETS Thousands/uL 261   RBC Million/uL 4.47   MCH pg 31.1   MCHC g/dL 34.2   RDW % 11.7   MPV fL 9.4       BMP:  Results from last 7 days   Lab Units 04/19/24  0648   POTASSIUM mmol/L 3.5   CHLORIDE mmol/L 100   CO2 mmol/L 29   BUN mg/dL 13   CREATININE mg/dL 0.77   CALCIUM mg/dL 9.5       Magnesium:       Coags:       Complications: none    Condition at Discharge: good     Discharge instructions/Information to patient and family:   See after visit summary for information provided to patient and family.      Provisions for Follow-Up Care:  See after visit summary for information related to follow-up care and any pertinent home health orders.      Disposition: Home    Planned Readmission: No    Discharge Statement   I spent 45 minutes minutes discharging the patient. This time was spent on the day of  discharge. I had direct contact with the patient on the day of discharge. Additional documentation is required if more than 30 minutes were spent on discharge. Evaluating the incision, discussing discharge instructions and restrictions, arranging follow up appointments, discussing medications

## 2024-04-19 NOTE — DISCHARGE INSTR - AVS FIRST PAGE
PLEASE NOTE THE FOLLOWING MEDICATION RECOMMENDATIONS:  Continue Eliquis 5 mg every 12 hours  Continue diltiazem 120 mg once daily  Take Protonix 40 mg once daily for 30 days, then stop  Please continue to monitor your blood pressures and call the office at the number below if they are persistently elevated - we did NOT start losartan upon discharge    RESTRICTIONS:   No heavy lifting (more than 5-10 pounds) or strenuous activity for one week.    No soaking in a bath tub/hot tub/swimming pool for one week or until groin heals. You may shower - please let soap and water run over the groins, no scrubbing, and pat the area dry. Please place band-aid on groins daily for up to five days, but you may remove sooner if no issues are noted.     If you notice ongoing bleeding, swelling, or firm lumps in groin near ablation incision, please contact Dr. Kim's office - [phone]. If you have any significant issues after hours or on the weekends, please call the on call cardiology number at [phone].

## 2024-04-19 NOTE — DISCHARGE SUMMARY
Discharge Summary - Amairani Vergara 74 y.o. female MRN: [id number]    Unit/Bed#: CW2 211-02 Encounter: [id number]      Admission Date: 4/19/2024   Discharge Date: 4/20/2024    Discharge Diagnosis:   Symptomatic paroxysmal atrial fibrillation s/p Afib ablation (PVI, posterior wall isolation) 4/19/2024 by Dr. Kim  -- AF burden 8% on Holter, conversion pauses up to 3.5 seconds  -- Eliquis for stroke prevention, [id number] = 3 (HTN, age, female)  -- diltiazem for rate control (intolerant to metoprolol)  HTN  HLD    Procedures Performed:   ALEX  Successful Afib ablation (cryo PVI, posterior wall isolation) 4/19/2024 by Dr. Kim  Orders Placed This Encounter   Procedures    Cardiac ep lab eps/ablations     Consultants: none    HPI: Amairani Vergara is a 74 y.o. female with symptomatic paroxysmal atrial fibrillation on Eliquis, hypertension, hyperlipidemia who presents [facility]-fib [facility].      Holter monitor showed 8% Afib burden and Afib with RVR. She had conversion pauses up to 3.5 seconds. She is on Eliquis and diltiazem. Afib ablation was recommended and she elected to proceed.     Hospital Course: Amairani Vergara presented in sinus rhythm. She underwent successful Afib ablation on 4/19/2024 by Dr. Kim. The procedure consisted of PVI and posterior wall isolation. She was admitted to med/surg telemetry and monitored overnight. Eliquis was resumed post procedure. She will take a PPI daily for 30 days. She was continued on diltiazem.      There were no acute issues or events overnight. The following morning she denied all cardiac complaints, including chest pain/pressure, dyspnea, palpitations, dizziness, lightheadedness, or syncope. Her vital signs were reviewed and labs are stable. Telemetry showed sinus rhythm with some marked sinus arrhythmia overnight. Her groins were soft without significant hematoma or recurrent bleeding, and sutures were removed without incident, pain much improved after tape removed.     "  Discharge instructions were reviewed with the patient in detail. She will follow up with EP in 4-6 weeks. She was stable for discharge home on 4/20/2024.     Medication Instructions:  Continue Eliquis 5 mg every 12 hours  Continue diltiazem 120 mg daily  Did attempt to start losartan 50mg twice daily after discussion with attending but after discussion with patient she normally runs systolic in 130's so she will continue to monitor at home and let us know if they are elevated and that losartan needs to be started.  Take Protonix 40 mg once daily for 30 days then stop    Physical exam:  /62   Pulse 69   Temp 97.9 °F (36.6 °C)   Resp 18   Ht 5' 2\" (1.575 m)   Wt 69.9 kg (154 lb)   SpO2 95%   BMI 28.17 kg/m²   GEN: NAD, alert and oriented x 3, well appearing  SKIN: dry without significant lesions or rashes  HEENT: NCAT, PERRL, EOMs intact  NECK: No JVD appreciated  CARDIOVASCULAR: RRR, normal S1, S2 without murmurs, rubs, or gallops appreciated  LUNGS: Clear to auscultation bilaterally without wheezes, rhonchi, or rales  ABDOMEN: Soft, nontender, nondistended  EXTREMITIES/VASCULAR: perfused without clubbing, cyanosis, or LE edema b/l  PSYCH: Normal mood and affect  NEURO: CN ll-Xll grossly intact      Discharge Medications:  See after visit summary for reconciled discharge medications provided to patient and family.    Medications Prior to Admission   Medication    apixaban (Eliquis) 5 mg    diltiazem (DILACOR XR) 120 MG 24 hr capsule    hydrochlorothiazide (MICROZIDE) 12.5 mg capsule    Mirabegron ER (Myrbetriq) 50 MG TB24    pravastatin (PRAVACHOL) 20 mg tablet    clobetasol (TEMOVATE) 0.05 % ointment       [facility]  CBC with diff:   Results from last 7 days   Lab Units 04/20/24  0511 04/19/24  0648   WBC Thousand/uL 11.44* 5.32   HEMOGLOBIN g/dL 12.6 13.9   HEMATOCRIT % 36.6 40.6   MCV fL 90 91   PLATELETS Thousands/uL 221 261   RBC Million/uL 4.07 4.47   MCH pg 31.0 31.1   MCHC " g/dL 34.4 34.2   RDW % 11.5* 11.7   MPV fL 9.6 9.4       BMP:  Results from last 7 days   Lab Units 04/19/24  0648   POTASSIUM mmol/L 3.5   CHLORIDE mmol/L 100   CO2 mmol/L 29   BUN mg/dL 13   CREATININE mg/dL 0.77   CALCIUM mg/dL 9.5       Magnesium:       Coags:       Complications: none    Condition at Discharge: good     Discharge instructions/Information to patient and family:   See after visit summary for information provided to patient and family.      Provisions for Follow-Up Care:  See after visit summary for information related to follow-up care and any pertinent home health orders.      Disposition: Home    Planned Readmission: No    Discharge Statement   I spent 45 minutes minutes discharging the patient. This time was spent on the day of discharge. I had direct contact with the patient on the day of discharge. Additional documentation is required if more than 30 minutes were spent on discharge. Evaluating the incision, discussing discharge instructions and restrictions, arranging follow up appointments, discussing medications.

## 2024-04-20 VITALS
OXYGEN SATURATION: 96 % | BODY MASS INDEX: 28.34 KG/M2 | HEART RATE: 83 BPM | WEIGHT: 154 LBS | DIASTOLIC BLOOD PRESSURE: 66 MMHG | TEMPERATURE: 97.8 F | HEIGHT: 62 IN | RESPIRATION RATE: 18 BRPM | SYSTOLIC BLOOD PRESSURE: 144 MMHG

## 2024-04-20 LAB
ANION GAP SERPL CALCULATED.3IONS-SCNC: 8 MMOL/L (ref 4–13)
BUN SERPL-MCNC: 12 MG/DL (ref 5–25)
CALCIUM SERPL-MCNC: 9.1 MG/DL (ref 8.4–10.2)
CHLORIDE SERPL-SCNC: 101 MMOL/L (ref 96–108)
CO2 SERPL-SCNC: 25 MMOL/L (ref 21–32)
CREAT SERPL-MCNC: 0.65 MG/DL (ref 0.6–1.3)
ERYTHROCYTE [DISTWIDTH] IN BLOOD BY AUTOMATED COUNT: 11.5 % (ref 11.6–15.1)
GFR SERPL CREATININE-BSD FRML MDRD: 88 ML/MIN/1.73SQ M
GLUCOSE P FAST SERPL-MCNC: 135 MG/DL (ref 65–99)
GLUCOSE SERPL-MCNC: 135 MG/DL (ref 65–140)
HCT VFR BLD AUTO: 36.6 % (ref 34.8–46.1)
HGB BLD-MCNC: 12.6 G/DL (ref 11.5–15.4)
INR PPP: 1.11 (ref 0.84–1.19)
MCH RBC QN AUTO: 31 PG (ref 26.8–34.3)
MCHC RBC AUTO-ENTMCNC: 34.4 G/DL (ref 31.4–37.4)
MCV RBC AUTO: 90 FL (ref 82–98)
PLATELET # BLD AUTO: 221 THOUSANDS/UL (ref 149–390)
PMV BLD AUTO: 9.6 FL (ref 8.9–12.7)
POTASSIUM SERPL-SCNC: 3.5 MMOL/L (ref 3.5–5.3)
PROTHROMBIN TIME: 14.2 SECONDS (ref 11.6–14.5)
RBC # BLD AUTO: 4.07 MILLION/UL (ref 3.81–5.12)
SODIUM SERPL-SCNC: 134 MMOL/L (ref 135–147)
WBC # BLD AUTO: 11.44 THOUSAND/UL (ref 4.31–10.16)

## 2024-04-20 PROCEDURE — 80048 BASIC METABOLIC PNL TOTAL CA: CPT | Performed by: PHYSICIAN ASSISTANT

## 2024-04-20 PROCEDURE — 85027 COMPLETE CBC AUTOMATED: CPT | Performed by: PHYSICIAN ASSISTANT

## 2024-04-20 PROCEDURE — NC001 PR NO CHARGE: Performed by: PHYSICIAN ASSISTANT

## 2024-04-20 PROCEDURE — 85610 PROTHROMBIN TIME: CPT | Performed by: PHYSICIAN ASSISTANT

## 2024-04-20 RX ORDER — PANTOPRAZOLE SODIUM 40 MG/1
40 TABLET, DELAYED RELEASE ORAL
Qty: 30 TABLET | Refills: 0 | Status: SHIPPED | OUTPATIENT
Start: 2024-04-21

## 2024-04-20 RX ADMIN — OXYBUTYNIN 10 MG: 10 TABLET, FILM COATED, EXTENDED RELEASE ORAL at 08:14

## 2024-04-20 RX ADMIN — LOSARTAN POTASSIUM 50 MG: 50 TABLET, FILM COATED ORAL at 08:15

## 2024-04-20 RX ADMIN — HYDROCHLOROTHIAZIDE 25 MG: 25 TABLET ORAL at 08:15

## 2024-04-20 RX ADMIN — PANTOPRAZOLE SODIUM 40 MG: 40 TABLET, DELAYED RELEASE ORAL at 05:11

## 2024-04-20 RX ADMIN — APIXABAN 5 MG: 5 TABLET, FILM COATED ORAL at 08:14

## 2024-04-20 NOTE — PLAN OF CARE
Problem: CARDIOVASCULAR - ADULT  Goal: Maintains optimal cardiac output and hemodynamic stability  Description: INTERVENTIONS:  - Monitor I/O, vital signs and rhythm  - Monitor for S/S and trends of decreased cardiac output  - Administer and titrate ordered vasoactive medications to optimize hemodynamic stability  - Assess quality of pulses, skin color and temperature  - Assess for signs of decreased coronary artery perfusion  - Instruct patient to report change in severity of symptoms  Outcome: Progressing  Goal: Absence of cardiac dysrhythmias or at baseline rhythm  Description: INTERVENTIONS:  - Continuous cardiac monitoring, vital signs, obtain 12 lead EKG if ordered  - Administer antiarrhythmic and heart rate control medications as ordered  - Monitor electrolytes and administer replacement therapy as ordered  Outcome: Progressing     Problem: SKIN/TISSUE INTEGRITY - ADULT  Goal: Skin Integrity remains intact(Skin Breakdown Prevention)  Description: Assess:  -Perform Golden assessment every    -Clean and moisturize skin every    -Inspect skin when repositioning, toileting, and assisting with ADLS  -Assess under medical devices such as   every    -Assess extremities for adequate circulation and sensation     Bed Management:  -Have minimal linens on bed & keep smooth, unwrinkled  -Change linens as needed when moist or perspiring  -Avoid sitting or lying in one position for more than   hours while in bed  -Keep HOB at  degrees     Toileting:  -Offer bedside commode  -Assess for incontinence every    -Use incontinent care products after each incontinent episode such as      Activity:  -Mobilize patient   times a day  -Encourage activity and walks on unit  -Encourage or provide ROM exercises   -Turn and reposition patient every   Hours  -Use appropriate equipment to lift or move patient in bed  -Instruct/ Assist with weight shifting every   when out of bed in chair  -Consider limitation of chair time   hour  intervals    Skin Care:  -Avoid use of baby powder, tape, friction and shearing, hot water or constrictive clothing  -Relieve pressure over bony prominences using    -Do not massage red bony areas    Next Steps:  -Teach patient strategies to minimize risks such as     -Consider consults to  interdisciplinary teams such as    Outcome: Progressing  Goal: Incision(s), wounds(s) or drain site(s) healing without S/S of infection  Description: INTERVENTIONS  - Assess and document dressing, incision, wound bed, drain sites and surrounding tissue  - Provide patient and family education  - Perform skin care/dressing changes every    Outcome: Progressing  Goal: Pressure injury heals and does not worsen  Description: Interventions:  - Implement low air loss mattress or specialty surface (Criteria met)  - Apply silicone foam dressing  - Instruct/assist with weight shifting every   minutes when in chair   - Limit chair time to   hour intervals  - Use special pressure reducing interventions such as   when in chair   - Apply fecal or urinary incontinence containment device   - Perform passive or active ROM every    - Turn and reposition patient & offload bony prominences every   hours   - Utilize friction reducing device or surface for transfers   - Consider consults to  interdisciplinary teams such as    - Use incontinent care products after each incontinent episode such as     - Consider nutrition services referral as needed  Outcome: Progressing

## 2024-04-23 LAB
KCT BLD-ACNC: 328 SEC (ref 89–137)
KCT BLD-ACNC: 347 SEC (ref 89–137)
KCT BLD-ACNC: 354 SEC (ref 89–137)
KCT BLD-ACNC: 368 SEC (ref 89–137)
KCT BLD-ACNC: 374 SEC (ref 89–137)
SPECIMEN SOURCE: ABNORMAL

## 2024-05-30 ENCOUNTER — OFFICE VISIT (OUTPATIENT)
Dept: CARDIOLOGY CLINIC | Facility: CLINIC | Age: 74
End: 2024-05-30
Payer: COMMERCIAL

## 2024-05-30 VITALS
HEART RATE: 73 BPM | DIASTOLIC BLOOD PRESSURE: 86 MMHG | SYSTOLIC BLOOD PRESSURE: 142 MMHG | WEIGHT: 153.5 LBS | HEIGHT: 62 IN | BODY MASS INDEX: 28.25 KG/M2

## 2024-05-30 DIAGNOSIS — I48.91 ATRIAL FIBRILLATION WITH RVR (HCC): Primary | ICD-10-CM

## 2024-05-30 PROCEDURE — 93000 ELECTROCARDIOGRAM COMPLETE: CPT | Performed by: PHYSICIAN ASSISTANT

## 2024-05-30 PROCEDURE — 99214 OFFICE O/P EST MOD 30 MIN: CPT | Performed by: PHYSICIAN ASSISTANT

## 2024-05-30 NOTE — PROGRESS NOTES
Electrophysiology Follow Up  [facility]  [facility] [address], [place] [address]    Name: Amairani Vergara  : 1950  MRN: [id number]    ASSESSMENT/PLAN:  Symptomatic paroxysmal atrial fibrillation s/p ablation 2024 by Dr. Kim  See A-fib history as below  Had 8% percent A-fib burden with conversion pauses up to 3.5 seconds on outpatient Holter monitor  A-fib ablation (cryo PVI and PW) 24 by Dr. Kim  [id number] 3 - maintained on Eliquis 5 Mg twice daily  Diltiazem for rate control (was intolerant to metoprolol)  Not currently on antiarrhythmic  2024 ALEX:  EF 60%, normal wall motion, LA + RA dilated, mild valvular disease  EKG in office today shows patient remains in sinus rhythm  HTN  BP in office today 142/86  Maintained on diltiazem 120mg daily and HCTZ 12.5mg daily  HLD      Discussion/Plan:    Patient presents to the office today in sinus rhythm on today's EKG    She reports immediately after her ablation she was having significant fatigue and some SOB when walking up stairs. She has also felt occasional palpitation which was in-line with prior afib symptoms. These episodes are fleeting and happening less frequently as time goes on. She feels her other symptoms are due to anesthesia and report they continue to improve with each passing day.     She has remained compliant with her Protonix and Eliquis    She reports her groin sites have healed appropriately     One month Protonix course has been completed, she can stop taking this medicine    Continue Eliquis and diltiazem at current dosing    BP slightly elevated at 142/86 in office today, she regularly monitors her BP at home and it was 129/64 this AM. She should continue to monitor her BP at home for the next 1-2 weeks; if it remains elevated can call our office for further instruction.    Patient has been instructed to follow up in our EP office in 3 months or as needed. She  will call our office with any questions or concerns in the meantime.    Rhythm History:   Atrial fibrillation:   Diagnosed 01/2023 after A-fib was noted on EKG when presenting for outpatient knee surgery  August 2023 Holter monitor showed A-fib burden of 8% and was symptomatic while in A-fib with chest tightness, palpitations, and shortness of breath  Was initially started on metoprolol and Eliquis, however did not tolerate metoprolol and was started on diltiazem  December 2023 Zio showed continued A-fib with RVR with rates greater than 200 bpm and conversion pauses lasting up to 3.5 seconds  Evaluated by EP Dr. Kim March 2024 and patient opted for ablation  04/19/2024 A-fib ablation was performed       Atrial flutter:      SVT:      VT/VF/PVC:     Device history:   Pacemaker:     Defibrillator:     BIV PPM:     BIV ICD:     ILR:      Interim History/HPI:   Interim history: Amairani Vergara is a 74 y.o. female with a PMH of paroxysmal atrial fibrillation s/p ablation 04/19/2024 by Dr. Kim, HTN, and HLD.    She presents today for routine outpatient follow up given her afib ablation on 04/19/2024. She reports that immediately following her procedure she was having significant fatigue and some SOB when walking up stairs. She has also felt occasional palpitation which was in-line with prior afib symptoms. These episodes are fleeting and happening less frequently as time goes on. She feels that her other symptoms are due to anesthesia and continue to improve with each passing day.  Otherwise she denies chest pain, SOB, dizzy/lightheadedness, syncope/presyncope, palpitation, and leg swelling, with the remainder of the ROS negative as below.      EKG: Sinus rhythm w/ sinus arrhythmia w/ ventricular rate 73bpm    Review of Systems   Constitutional:  Positive for fatigue. Negative for appetite change, chills, fever and unexpected weight change.   Respiratory:  Positive for shortness of breath (on exertion). Negative for  "chest tightness.    Cardiovascular:  Positive for palpitations. Negative for chest pain and leg swelling.   Neurological:  Negative for dizziness, syncope, weakness and light-headedness.         OBJECTIVE:   Vitals:   /86 (BP Location: Left arm, Patient Position: Sitting, Cuff Size: Large)   Pulse 73   Ht 5' 2\" (1.575 m)   Wt 69.6 kg (153 lb 8 oz)   BMI 28.08 kg/m²   Body mass index is 28.08 kg/m².        Physical Exam:   Physical Exam  Constitutional:       General: She is not in acute distress.     Appearance: Normal appearance. She is not ill-appearing.   HENT:      Head: Normocephalic and atraumatic.      Nose: Nose normal.   Eyes:      General:         Right eye: No discharge.         Left eye: No discharge.   Neck:      Vascular: No carotid bruit.   Cardiovascular:      Rate and Rhythm: Normal rate and regular rhythm.      Pulses: Normal pulses.      Heart sounds: Normal heart sounds.   Pulmonary:      Effort: Pulmonary effort is normal.      Breath sounds: Normal breath sounds.   Musculoskeletal:      Right lower leg: No edema.      Left lower leg: No edema.   Skin:     General: Skin is warm and dry.      Capillary Refill: Capillary refill takes less than 2 seconds.   Neurological:      Mental Status: She is alert.            Medications:      Current Outpatient Medications:     apixaban (Eliquis) 5 mg, Take 1 tablet (5 mg total) by mouth 2 (two) times a day, Disp: 180 tablet, Rfl: 0    clobetasol (TEMOVATE) 0.05 % ointment, Apply topically 2 (two) times a day (Patient taking differently: Apply topically 2 (two) times a week), Disp: 30 g, Rfl: 3    diltiazem (DILACOR XR) 120 MG 24 hr capsule, Take 1 capsule (120 mg total) by mouth daily, Disp: 90 capsule, Rfl: 5    hydrochlorothiazide (MICROZIDE) 12.5 mg capsule, Take 2 capsules (25 mg total) by mouth every morning, Disp: 180 capsule, Rfl: 5    Mirabegron ER (Myrbetriq) 50 MG TB24, Take 1 tablet (50 mg total) by mouth daily, Disp: 90 tablet, Rfl: " 1    pravastatin (PRAVACHOL) 20 mg tablet, TAKE 1 TABLET DAILY, Disp: 90 tablet, Rfl: 1       Historical Information   Past Medical History:   Diagnosis Date    Arthritis 2021    R knee replacement scheduled      Cataract     bilateral     Hyperlipidemia     Hypertension     Osteopenia     s/p AFib ablation (PVI, posterio wall) 2024    Urinary incontinence        Past Surgical History:   Procedure Laterality Date    CARDIAC ELECTROPHYSIOLOGY PROCEDURE N/A 2024    Procedure: Cardiac eps/afib ablation;  Surgeon: Huang Kim MD;  Location: [facility];  Service: Cardiology    CATARACT EXTRACTION Left 2022    [facility], Dr. Monet    CATARACT EXTRACTION Right 2022    [facility], Dr. Monet    INCONTINENCE SURGERY      REPLACEMENT TOTAL KNEE Right 10/11/2023    [facility], Dr. Murtaza Barr    TUBAL LIGATION         Social History     Substance and Sexual Activity   Alcohol Use Yes     Social History     Substance and Sexual Activity   Drug Use Never     Social History     Tobacco Use   Smoking Status Former    Current packs/day: 0.00    Average packs/day: 0.3 packs/day for 10.0 years (2.5 ttl pk-yrs)    Types: Cigarettes    Start date: 1975    Quit date: 1985    Years since quittin.5   Smokeless Tobacco Never       Family History   Problem Relation Age of Onset    Hypertension Mother     No Known Problems Father     Diabetes Maternal Grandmother     No Known Problems Maternal Grandfather     No Known Problems Paternal Grandmother     No Known Problems Paternal Grandfather     No Known Problems Sister     No Known Problems Daughter     No Known Problems Son     No Known Problems Daughter     No Known Problems Son     No Known Problems Maternal Aunt     No Known Problems Paternal Aunt     Breast cancer Neg Hx     Breast cancer additional onset Neg Hx     Colon cancer Neg Hx     Endometrial cancer Neg Hx     Ovarian  cancer Neg Hx     BRCA 1/2 Neg Hx     BRCA1 Negative Neg Hx     BRCA1 Positive Neg Hx     BRCA2 Negative Neg Hx     BRCA2 Positive Neg Hx          Labs & Results:  Below is the patient's most recent value for Albumin, ALT, AST, BUN, Calcium, Chloride, Cholesterol, CO2, Creatinine, GFR, Glucose, HDL, Hematocrit, Hemoglobin, Hemoglobin A1C, LDL, Magnesium, Phosphorus, Platelets, Potassium, PSA, Sodium, Triglycerides, and WBC.   Lab Results   Component Value Date    ALT 11 04/02/2024    AST 14 04/02/2024    BUN 12 04/20/2024    CALCIUM 9.1 04/20/2024     04/20/2024    CO2 25 04/20/2024    CREATININE 0.65 04/20/2024    HDL 70 11/17/2023    HCT 36.6 04/20/2024    HGB 12.6 04/20/2024     04/20/2024    K 3.5 04/20/2024    TRIG 109 11/17/2023    WBC 11.44 (H) 04/20/2024     Note: for a comprehensive list of the patient's lab results, access the Results Review activity.

## 2024-07-08 ENCOUNTER — HOSPITAL ENCOUNTER (OUTPATIENT)
Dept: MAMMOGRAPHY | Facility: HOSPITAL | Age: 74
Discharge: HOME | End: 2024-07-08
Attending: FAMILY MEDICINE
Payer: COMMERCIAL

## 2024-07-08 VITALS — BODY MASS INDEX: 28.16 KG/M2 | HEIGHT: 62 IN | WEIGHT: 153 LBS

## 2024-07-08 DIAGNOSIS — Z12.31 ENCOUNTER FOR SCREENING MAMMOGRAM FOR MALIGNANT NEOPLASM OF BREAST: ICD-10-CM

## 2024-07-08 PROCEDURE — 77067 SCR MAMMO BI INCL CAD: CPT

## 2024-07-08 PROCEDURE — 77063 BREAST TOMOSYNTHESIS BI: CPT

## 2024-07-18 DIAGNOSIS — E78.2 MIXED HYPERLIPIDEMIA: ICD-10-CM

## 2024-07-18 RX ORDER — PRAVASTATIN SODIUM 20 MG
TABLET ORAL
Qty: 90 TABLET | Refills: 0 | Status: SHIPPED | OUTPATIENT
Start: 2024-07-18

## 2024-07-19 DIAGNOSIS — R32 URINARY INCONTINENCE, UNSPECIFIED TYPE: ICD-10-CM

## 2024-07-19 RX ORDER — MIRABEGRON 50 MG/1
50 TABLET, EXTENDED RELEASE ORAL DAILY
Qty: 100 TABLET | Refills: 0 | Status: SHIPPED | OUTPATIENT
Start: 2024-07-19

## 2024-08-01 DIAGNOSIS — L21.9 SEBORRHEA: ICD-10-CM

## 2024-08-02 RX ORDER — CLOBETASOL PROPIONATE 0.5 MG/G
OINTMENT TOPICAL 2 TIMES WEEKLY
Qty: 30 G | Refills: 1 | Status: SHIPPED | OUTPATIENT
Start: 2024-08-05

## 2024-08-05 ENCOUNTER — TELEPHONE (OUTPATIENT)
Dept: FAMILY MEDICINE CLINIC | Facility: CLINIC | Age: 74
End: 2024-08-05

## 2024-08-05 NOTE — TELEPHONE ENCOUNTER
Fax received from [employer] requesting prior authorization for clobetasol propionate 0.05% Patient instructions are Apply topically 2 (two) times a week     Key [id number]  Please initiate prior authorization

## 2024-08-07 NOTE — PROGRESS NOTES
Consultation - Electrophysiology-Cardiology (EP)   Amairani Vergara 74 y.o. female MRN: [id number]  Unit/Bed#:  Encounter: [id number]      1. Atrial fibrillation with RVR (HCC)  POCT ECG      2. Benign essential hypertension        3. Paroxysmal atrial fibrillation (HCC)        4. s/p AFib ablation (PVI, posterior wall) 4/19/2024        5. Mixed hyperlipidemia                Consults  Physician Requesting Consult: Nader Benedict  Reason for Consult / Principal Problem: atrial fibrillation       Summary of my recommendation for the patient  The patient has undergone comprehensive A-fib ablation and remains in sinus rhythm     Continue with diltiazem and Eliquis for now    Please set up with general cardiology at [facility]    If patient develops significant bradycardia will need a pacemaker      Clinical conditions  A-fib with RVR, post comprehensive ablation and in sinus rhythm  Long-term anticoagulation  Hypertension  Hyperlipidemia          Assessment & Plan     Atrial fibrillation with RVR, burden 8% , incidentally noted when went for knee surgery, went back to rhythm by herself  Long-term anticoagulation    Patient has undergone comprehensive ablation and remains in sinus rhythm    Patient complains of intermittent exertional intolerance, chest tightness, palpitation -documented A-fib 8%    Used metoprolol, did not tolerate and currently on diltiazem and Eliquis    Explained in detail pathogenesis and therapy    Anticoagulation-Eliquis    Rate control-diltiazem    Rhythm control -if no ischemia on nuclear stress study can use flecainide along with diltiazem; avoid propafenone and sotalol because of intrinsic beta-blocker effect; dofetilide cannot be used with diltiazem; amiodarone has significant long-term side effects; if flecainide fails then will need to proceed with ablation; ablation has 70% success rate in a year compared to 30% for medication in  its ability to maintain sinus rhythm    Patient does have  underlying sick sinus syndrome, 1 conversion pause greater than 3 seconds-if has significant bradycardia will end up needing pacemaker          Hypertension  136/70  Patient is currently on hydrochlorothiazide and diltiazem      Hyperlipidemia  On pravastatin  No myositis or myalgia              History of Present Illness   HPI: Amairani Vergara is a 74 y.o. year old female has been referred to me by Dr Benedict for the evaluation and management of atrial fibrillation with RVR    Patient is doing well post ablation and remains in normal rhythm      The patient has significant medical illnesses which include  Atrial fibrillation with RVR, burden 8% , incidentally noted when went for knee surgery, went back to rhythm by herself  Long-term anticoagulation  Hypertension  Hyperlipidemia      Initially diagnosed with A-fib when went in for knee surgery-incidentally identified-January 2023    Since then she has had multiple episodes which are associated with some palpitation, some exertional intolerance and chest tightness    She is not complaining of orthopnea, PND, leg swelling  She is not complaining of syncope  She does have some exertional intolerance    She does not abuse tobacco alcohol or energy drinks    She does not snore  She does have occasional morning fatigue and daytime sleepiness          Historical Information   Past Medical History:   Diagnosis Date    Arthritis 05/2021    R knee replacement scheduled  01/23    Cataract     bilateral     Hyperlipidemia     Hypertension     Osteopenia     s/p AFib ablation (PVI, posterio wall) 4/19/2024 04/19/2024    Urinary incontinence      Past Surgical History:   Procedure Laterality Date    CARDIAC ELECTROPHYSIOLOGY PROCEDURE N/A 4/19/2024    Procedure: Cardiac eps/afib ablation;  Surgeon: Huang Kim MD;  Location: [facility];  Service: Cardiology    CATARACT EXTRACTION Left 01/28/2022    [facility], Dr. Monet    CATARACT EXTRACTION Right  2022    [facility], Dr. Monet    INCONTINENCE SURGERY      REPLACEMENT TOTAL KNEE Right 10/11/2023    [facility], Dr. Murtaza Barr    TUBAL LIGATION       Social History     Substance and Sexual Activity   Alcohol Use Yes     Social History     Substance and Sexual Activity   Drug Use Never     Social History     Tobacco Use   Smoking Status Former    Current packs/day: 0.00    Average packs/day: 0.3 packs/day for 10.0 years (2.5 ttl pk-yrs)    Types: Cigarettes    Start date: 1975    Quit date: 1985    Years since quittin.7   Smokeless Tobacco Never     Social History     Socioeconomic History    Marital status:      Spouse name: Mohsen     Number of children: 3    Years of education: Not on file    Highest education level: Not on file   Occupational History    Occupation: Retired    Tobacco Use    Smoking status: Former     Current packs/day: 0.00     Average packs/day: 0.3 packs/day for 10.0 years (2.5 ttl pk-yrs)     Types: Cigarettes     Start date: 1975     Quit date: 1985     Years since quittin.7    Smokeless tobacco: Never   Vaping Use    Vaping status: Never Used   Substance and Sexual Activity    Alcohol use: Yes    Drug use: Never    Sexual activity: Not Currently     Birth control/protection: Post-menopausal   Other Topics Concern    Not on file   Social History Narrative    3 children, Felicia Sandoval Christopher        RN     Social Determinants of Health     Financial Resource Strain: Medium Risk (2023)    Overall Financial Resource Strain (CARDIA)     Difficulty of Paying Living Expenses: Somewhat hard   Food Insecurity: No Food Insecurity (10/11/2023)    [facility]    Hunger Vital Sign     Worried About Running Out of Food in the Last Year: Never true     Ran Out of Food in the Last Year: Never true   Transportation Needs: No Transportation Needs  (11/29/2023)    PRAPARE - Transportation     Lack of Transportation (Medical): No     Lack of Transportation (Non-Medical): No   Physical Activity: Not on file   Stress: Not on file   Social Connections: Not on file   Intimate Partner Violence: Not At Risk (10/11/2023)    [facility] from [facility], [facility]    Humiliation, Afraid, Rape, and Kick questionnaire     Fear of Current or Ex-Partner: No     Emotionally Abused: No     Physically Abused: No     Sexually Abused: No   Housing Stability: Low Risk  (10/11/2023)    [facility] from [facility], [facility]    Housing Stability Vital Sign     Unable to Pay for Housing in the Last Year: No     Number of Places Lived in the Last Year: 1     Unstable Housing in the Last Year: No     .  Family History:  Family History   Problem Relation Age of Onset    Hypertension Mother     No Known Problems Father     Diabetes Maternal Grandmother     No Known Problems Maternal Grandfather     No Known Problems Paternal Grandmother     No Known Problems Paternal Grandfather     No Known Problems Sister     No Known Problems Daughter     No Known Problems Son     No Known Problems Daughter     No Known Problems Son     No Known Problems Maternal Aunt     No Known Problems Paternal Aunt     Breast cancer Neg Hx     Breast cancer additional onset Neg Hx     Colon cancer Neg Hx     Endometrial cancer Neg Hx     Ovarian cancer Neg Hx     BRCA 1/2 Neg Hx     BRCA1 Negative Neg Hx     BRCA1 Positive Neg Hx     BRCA2 Negative Neg Hx     BRCA2 Positive Neg Hx          Meds/Allergies      No current facility-administered medications for this visit.        Not in a hospital admission.      Allergies   Allergen Reactions    Metoprolol Hives and Shortness Of Breath     Lightheadedness, cough    Ibandronic Acid Other (See Comments)     Joint pain           Objective   Vitals: Visit Vitals  /80 (BP Location: Left arm,  "Patient Position: Sitting, Cuff Size: Standard)   Pulse 63   Ht 5' 2\" (1.575 m)   Wt 71 kg (156 lb 9.6 oz)   SpO2 97%   BMI 28.64 kg/m²   OB Status Postmenopausal   Smoking Status Former   BSA 1.72 m²      Vitals:    08/08/24 1353   Weight: 71 kg (156 lb 9.6 oz)     [unfilled]    Invasive Devices       Peripheral Intravenous Line  Duration             Peripheral IV 04/19/24 Left Antecubital 111 days                      ROS  Review of Systems   All other systems reviewed and are negative.  As described in my history of present illness        PHYSICAL EXAM  Physical Exam  Vitals reviewed.   Constitutional:       General: She is not in acute distress.     Appearance: Normal appearance. She is not ill-appearing.   HENT:      Head: Normocephalic and atraumatic.      Right Ear: External ear normal.      Left Ear: External ear normal.      Nose: Nose normal.      Mouth/Throat:      Comments: Posterior pharynx is slightly crowded  Eyes:      General: No scleral icterus.     Extraocular Movements: Extraocular movements intact.      Conjunctiva/sclera: Conjunctivae normal.      Pupils: Pupils are equal, round, and reactive to light.   Cardiovascular:      Rate and Rhythm: Normal rate and regular rhythm.      Pulses: Normal pulses.      Heart sounds: Normal heart sounds. No murmur heard.     No gallop.   Pulmonary:      Effort: Pulmonary effort is normal. No respiratory distress.      Breath sounds: Normal breath sounds. No wheezing or rales.   Abdominal:      General: Bowel sounds are normal. There is no distension.      Tenderness: There is no abdominal tenderness.   Musculoskeletal:         General: No swelling, tenderness or deformity.      Cervical back: Neck supple. No rigidity.   Skin:     Coloration: Skin is not jaundiced.      Findings: No bruising or lesion.   Neurological:      Mental Status: She is alert and oriented to person, place, and time. Mental status is at baseline.      Motor: No weakness.   Psychiatric:   "       Mood and Affect: Mood normal.         Behavior: Behavior normal.         Thought Content: Thought content normal.         Judgment: Judgment normal.         LAB RESULTS:    CBC:  WBC   Date Value Ref Range Status   04/20/2024 11.44 (H) 4.31 - 10.16 Thousand/uL Final     Hemoglobin   Date Value Ref Range Status   04/20/2024 12.6 11.5 - 15.4 g/dL Final   10/12/2023 13.0 11.5 - 14.5 g/dL Final     Hematocrit   Date Value Ref Range Status   04/20/2024 36.6 34.8 - 46.1 % Final   10/12/2023 39.1 35.0 - 43.0 % Final     MCV   Date Value Ref Range Status   04/20/2024 90 82 - 98 fL Final     Platelets   Date Value Ref Range Status   04/20/2024 221 149 - 390 Thousands/uL Final     RBC   Date Value Ref Range Status   04/20/2024 4.07 3.81 - 5.12 Million/uL Final     MCH   Date Value Ref Range Status   04/20/2024 31.0 26.8 - 34.3 pg Final     MCHC   Date Value Ref Range Status   04/20/2024 34.4 31.4 - 37.4 g/dL Final     RDW   Date Value Ref Range Status   04/20/2024 11.5 (L) 11.6 - 15.1 % Final     MPV   Date Value Ref Range Status   04/20/2024 9.6 8.9 - 12.7 fL Final     nRBC   Date Value Ref Range Status   04/02/2024 0 /100 WBCs Final       CMP:  Potassium   Date Value Ref Range Status   04/20/2024 3.5 3.5 - 5.3 mmol/L Final   10/12/2023 3.6 3.5 - 5.2 mmol/L Final     Chloride   Date Value Ref Range Status   04/20/2024 101 96 - 108 mmol/L Final   10/12/2023 98 (L) 100 - 109 mmol/L Final     Carbon Dioxide   Date Value Ref Range Status   10/12/2023 23 21 - 31 mmol/L Final     CO2   Date Value Ref Range Status   04/20/2024 25 21 - 32 mmol/L Final     BUN   Date Value Ref Range Status   04/20/2024 12 5 - 25 mg/dL Final   10/12/2023 12 7 - 25 mg/dL Final     Creatinine   Date Value Ref Range Status   04/20/2024 0.65 0.60 - 1.30 mg/dL Final     Comment:     Standardized to IDMS reference method   10/12/2023 0.87 0.40 - 1.10 mg/dL Final     Calcium   Date Value Ref Range Status   04/20/2024 9.1 8.4 - 10.2 mg/dL Final  "  10/12/2023 8.8 8.5 - 10.1 mg/dL Final     AST   Date Value Ref Range Status   04/02/2024 14 13 - 39 U/L Final     ALT   Date Value Ref Range Status   04/02/2024 11 7 - 52 U/L Final     Comment:     Specimen collection should occur prior to Sulfasalazine administration due to the potential for falsely depressed results.      Alkaline Phosphatase   Date Value Ref Range Status   04/02/2024 62 34 - 104 U/L Final     eGFRcr   Date Value Ref Range Status   10/12/2023 70 >59 Final     eGFR   Date Value Ref Range Status   04/20/2024 88 ml/min/1.73sq m Final        Magnesium:   No results found for: \"MG\", \"MAGNESIUM\"     A1C:  No results found for: \"HGBA1C\"     TSH:  TSH 3RD GENERATON   Date Value Ref Range Status   11/17/2023 1.748 0.450 - 4.500 uIU/mL Final     Comment:     The recommended reference ranges for TSH during pregnancy are as follows:   First trimester 0.100 to 2.500 uIU/mL   Second trimester  0.200 to 3.000 uIU/mL   Third trimester 0.300 to 3.000 uIU/m    Note: Normal ranges may not apply to patients who are transgender, non-binary, or whose legal sex, sex at birth, and gender identity differ.  Adult TSH (3rd generation) reference range follows the recommended guidelines of the American [employer] Association, January, 2020.        PT/INR:  Protime   Date Value Ref Range Status   04/20/2024 14.2 11.6 - 14.5 seconds Final     INR   Date Value Ref Range Status   04/20/2024 1.11 0.84 - 1.19 Final       Lipid Panel:  Cholesterol   Date Value Ref Range Status   11/17/2023 178 See Comment mg/dL Final     Comment:     Cholesterol:         Pediatric <18 Years        Desirable          <170 mg/dL      Borderline High    170-199 mg/dL      High               >=200 mg/dL        Adult >=18 Years            Desirable        <200 mg/dL      Borderline High  200-239 mg/dL      High             >239 mg/dL       Triglycerides   Date Value Ref Range Status   11/17/2023 109 See Comment mg/dL Final     Comment:     Triglyceride:   " "  0-9Y            <75mg/dL     10Y-17Y         <90 mg/dL       >=18Y     Normal          <150 mg/dL     Borderline High 150-199 mg/dL     High            200-499 mg/dL        Very High       >499 mg/dL    Specimen collection should occur prior to Metamizole administration due to the potential for falsely depressed results.     HDL, Direct   Date Value Ref Range Status   2023 70 >=50 mg/dL Final     Non-HDL-Chol (CHOL-HDL)   Date Value Ref Range Status   2023 108 mg/dl Final       Troponin:  No results found for: \"TROPONINI\"      Imaging:    [id number]        ALEX:  No results found for this or any previous visit.      Echocardiogram:  Results for orders placed during the hospital encounter of 23    Echo complete w/ contrast if indicated    Interpretation Summary    Left Ventricle: Left ventricular cavity size is normal. Wall thickness is normal. The left ventricular ejection fraction is 60%. Systolic function is normal. Wall motion is normal. Diastolic function is normal for age.    Right Ventricle: Right ventricular cavity size is normal. Systolic function is normal.      Stress Test:   No results found for this or any previous visit.      Cardiac Catheterization:  No results found for this or any previous visit.      HOLTER MONITOR: 24 HOUR/48 HOUR MONITORS  Results for orders placed during the hospital encounter of 23    Holter monitor    Interpretation Summary  INDICATIONS: Palpitations    DESCRIPTION OF FINDINGS:  The patient was monitored for a total of 48 hours.  The patient was predominantly noted to be in sinus rhythm throughout the study.  The average heart rate was 71 beats per minute.  The heart rate ranged from a low of 45 beats per minute in sinus bradycardia at 8:19 AM to a maximum of 171 beats per minute in atrial fibrillation at 8:20 PM.    Ventricular ectopic activity consisted of 43 PVCs.  There was no sustained or nonsustained ventricular " tachycardia.    Supraventricular ectopic activity consisted of 5251 PACs (2.6% of total beats).  Atrial fibrillation is present at an 8% burden  There were no significant pauses. The longest R-R interval was 2.1 seconds.  There was no evidence of advanced degree heart block.    The accompanying patient diary notes symptoms of shortness of breath. Correlation with the tracings at these times reveals rapid A-fib.    Impression  Predominantly sinus rhythm throughout.  Episodes of atrial fibrillation with rapid ventricular response total burden 8%.  These seem to correlate with symptoms of shortness of breath.  Rare PACs and PVCs      AMB Extended Holter Monitor: Zio XT/AT or BioTel  Results for orders placed in visit on 01/10/24    AMB extended holter monitor      Zio XT  12/14/2023 - 12/28/2023   Nader Benedict MD  1/10/2024  4:13 PM EST       Please notify pt there were some abnormal findings on her ziopatch. Nothing emergent.     Please ask her to schedule with the EP team of Afib specialists to further discuss her options. She should continue her current regimen including her AC now.        Thanks!     - Nader Benedict MD  1/10/2024  4:12 PM EST       Impression:     Patient had a min HR of 38 bpm, max HR of 235 bpm, and avg HR of 70 bpm.  Predominant underlying rhythm was Sinus Rhythm. 871 Supraventricular  Tachycardia runs occurred, the run with the fastest interval lasting 5 beats with a  max rate of 235 bpm, the longest lasting 11.7 secs with an avg rate of 177 bpm.  Atrial Fibrillation occurred (1% burden), ranging from  bpm (avg of 140 bpm),  the longest lasting 35 mins 16 secs with an avg rate of 147 bpm.     1 Pause occurred lasting 3.5 secs (17 bpm).     Supraventricular Tachycardia and Atrial Fibrillation were  detected within +/- 45 seconds of symptomatic patient event(s). Isolated SVEs were  frequent (5.1%, 03297), SVE Couplets were rare (<1.0%, 4487), and SVE Triplets  were rare (<1.0%, 1600).  Isolated VEs were rare (<1.0%, 4359), VE Couplets were  rare (<1.0%, 86), and VE Triplets were rare (<1.0%, 1). MD notification criteria for  Rapid Atrial Fibrillation met - report posted prior to notification per account request  ([facility]).     The recording showed rapid AF as described above. 1 conversion pause noted.     Patient reported symptoms correlated with Afib, conversion pause, NSR and sinus bradycardia.     I reviewed all strips personally.                           DEVICE CHECK:     No results found for this or any previous visit.           Code Status: [unfilled]  Advance Directive and Living Will:      Power of :    POLST:      Counseling / Coordination of Care  Detailed discussion done with regards to management of [id number] medication, ablation, pacemaker    Patient is going to discuss at home and then make a final decision      Huang Kim MD

## 2024-08-08 ENCOUNTER — OFFICE VISIT (OUTPATIENT)
Dept: CARDIOLOGY CLINIC | Facility: CLINIC | Age: 74
End: 2024-08-08
Payer: COMMERCIAL

## 2024-08-08 VITALS
DIASTOLIC BLOOD PRESSURE: 80 MMHG | HEART RATE: 63 BPM | HEIGHT: 62 IN | SYSTOLIC BLOOD PRESSURE: 154 MMHG | WEIGHT: 156.6 LBS | OXYGEN SATURATION: 97 % | BODY MASS INDEX: 28.82 KG/M2

## 2024-08-08 DIAGNOSIS — I48.0 PAROXYSMAL ATRIAL FIBRILLATION (HCC): ICD-10-CM

## 2024-08-08 DIAGNOSIS — I48.91 ATRIAL FIBRILLATION WITH RVR (HCC): Primary | ICD-10-CM

## 2024-08-08 DIAGNOSIS — Z98.890 S/P ABLATION OF ATRIAL FIBRILLATION: ICD-10-CM

## 2024-08-08 DIAGNOSIS — Z86.79 S/P ABLATION OF ATRIAL FIBRILLATION: ICD-10-CM

## 2024-08-08 DIAGNOSIS — I10 BENIGN ESSENTIAL HYPERTENSION: ICD-10-CM

## 2024-08-08 DIAGNOSIS — E78.2 MIXED HYPERLIPIDEMIA: ICD-10-CM

## 2024-08-08 PROCEDURE — 93000 ELECTROCARDIOGRAM COMPLETE: CPT | Performed by: INTERNAL MEDICINE

## 2024-08-08 PROCEDURE — 99213 OFFICE O/P EST LOW 20 MIN: CPT | Performed by: INTERNAL MEDICINE

## 2024-08-08 NOTE — PATIENT INSTRUCTIONS
- SCHEDULE appointment with [facility] general cardiology at [facility], Dr. Camejo

## 2024-08-08 NOTE — LETTER
August 8, 2024     Dania Dorman DO  [address]  [address]  [facility] [address]    Patient: Amairani Vergara   YOB: 1950   Date of Visit: 8/8/2024       Dear Dr. Dorman:    Thank you for referring Amairani Vergara to me for evaluation. Below are my notes for this consultation.    If you have questions, please do not hesitate to call me. I look forward to following your patient along with you.         Sincerely,        Huang Kim MD        CC: Amairani Vergara  MD Huang Ramos MD  8/8/2024  2:23 PM  Sign when Signing Visit   Consultation - Electrophysiology-Cardiology (EP)   Amairani Vergara 74 y.o. female MRN: [id number]  Unit/Bed#:  Encounter: [id number]      1. Atrial fibrillation with RVR (HCC)  POCT ECG      2. Benign essential hypertension        3. Paroxysmal atrial fibrillation (HCC)        4. s/p AFib ablation (PVI, posterior wall) 4/19/2024        5. Mixed hyperlipidemia                Consults  Physician Requesting Consult: Nader Benedict  Reason for Consult / Principal Problem: atrial fibrillation       Summary of my recommendation for the patient  The patient has undergone comprehensive A-fib ablation and remains in sinus rhythm     Continue with diltiazem and Eliquis for now    Please set up with general cardiology at [facility]    If patient develops significant bradycardia will need a pacemaker      Clinical conditions  A-fib with RVR, post comprehensive ablation and in sinus rhythm  Long-term anticoagulation  Hypertension  Hyperlipidemia          Assessment & Plan    Atrial fibrillation with RVR, burden 8% , incidentally noted when went for knee surgery, went back to rhythm by herself  Long-term anticoagulation    Patient has undergone comprehensive ablation and remains in sinus rhythm    Patient complains of intermittent exertional intolerance, chest tightness, palpitation -documented A-fib 8%    Used metoprolol, did not tolerate and currently on diltiazem and  Eliquis    Explained in detail pathogenesis and therapy    Anticoagulation-Eliquis    Rate control-diltiazem    Rhythm control -if no ischemia on nuclear stress study can use flecainide along with diltiazem; avoid propafenone and sotalol because of intrinsic beta-blocker effect; dofetilide cannot be used with diltiazem; amiodarone has significant long-term side effects; if flecainide fails then will need to proceed with ablation; ablation has 70% success rate in a year compared to 30% for medication in  its ability to maintain sinus rhythm    Patient does have underlying sick sinus syndrome, 1 conversion pause greater than 3 seconds-if has significant bradycardia will end up needing pacemaker          Hypertension  136/70  Patient is currently on hydrochlorothiazide and diltiazem      Hyperlipidemia  On pravastatin  No myositis or myalgia              History of Present Illness  HPI: Amairani Vergara is a 74 y.o. year old female has been referred to me by Dr Benedict for the evaluation and management of atrial fibrillation with RVR    Patient is doing well post ablation and remains in normal rhythm      The patient has significant medical illnesses which include  Atrial fibrillation with RVR, burden 8% , incidentally noted when went for knee surgery, went back to rhythm by herself  Long-term anticoagulation  Hypertension  Hyperlipidemia      Initially diagnosed with A-fib when went in for knee surgery-incidentally identified-January 2023    Since then she has had multiple episodes which are associated with some palpitation, some exertional intolerance and chest tightness    She is not complaining of orthopnea, PND, leg swelling  She is not complaining of syncope  She does have some exertional intolerance    She does not abuse tobacco alcohol or energy drinks    She does not snore  She does have occasional morning fatigue and daytime sleepiness          Historical Information  Past Medical History:   Diagnosis Date   •  Arthritis 2021    R knee replacement scheduled     • Cataract     bilateral    • Hyperlipidemia    • Hypertension    • Osteopenia    • s/p AFib ablation (PVI, posterio wall) 2024   • Urinary incontinence      Past Surgical History:   Procedure Laterality Date   • CARDIAC ELECTROPHYSIOLOGY PROCEDURE N/A 2024    Procedure: Cardiac eps/afib ablation;  Surgeon: Huang Kim MD;  Location: [facility];  Service: Cardiology   • CATARACT EXTRACTION Left 2022    [facility] [place], Dr. Monet   • CATARACT EXTRACTION Right 2022    [facility], Dr. Monet   • INCONTINENCE SURGERY     • REPLACEMENT TOTAL KNEE Right 10/11/2023    [facility], Dr. Murtaza Barr   • TUBAL LIGATION       Social History     Substance and Sexual Activity   Alcohol Use Yes     Social History     Substance and Sexual Activity   Drug Use Never     Social History     Tobacco Use   Smoking Status Former   • Current packs/day: 0.00   • Average packs/day: 0.3 packs/day for 10.0 years (2.5 ttl pk-yrs)   • Types: Cigarettes   • Start date: 1975   • Quit date: 1985   • Years since quittin.7   Smokeless Tobacco Never     Social History     Socioeconomic History   • Marital status:      Spouse name: Mohsen    • Number of children: 3   • Years of education: Not on file   • Highest education level: Not on file   Occupational History   • Occupation: Retired    Tobacco Use   • Smoking status: Former     Current packs/day: 0.00     Average packs/day: 0.3 packs/day for 10.0 years (2.5 ttl pk-yrs)     Types: Cigarettes     Start date: 1975     Quit date: 1985     Years since quittin.7   • Smokeless tobacco: Never   Vaping Use   • Vaping status: Never Used   Substance and Sexual Activity   • Alcohol use: Yes   • Drug use: Never   • Sexual activity: Not Currently     Birth control/protection: Post-menopausal   Other Topics Concern   • Not on file    Social History Narrative    3 children, Felicia Sandoval Christopher        RN     Social Determinants of Health     Financial Resource Strain: Medium Risk (11/29/2023)    Overall Financial Resource Strain (CARDIA)    • Difficulty of Paying Living Expenses: Somewhat hard   Food Insecurity: No Food Insecurity (10/11/2023)    [facility]    Hunger Vital Sign    • Worried About Running Out of Food in the Last Year: Never true    • Ran Out of Food in the Last Year: Never true   Transportation Needs: No Transportation Needs (11/29/2023)    PRAPARE - Transportation    • Lack of Transportation (Medical): No    • Lack of Transportation (Non-Medical): No   Physical Activity: Not on file   Stress: Not on file   Social Connections: Not on file   Intimate Partner Violence: Not At Risk (10/11/2023)    [facility]    Humiliation, Afraid, Rape, and Kick questionnaire    • Fear of Current or Ex-Partner: No    • Emotionally Abused: No    • Physically Abused: No    • Sexually Abused: No   Housing Stability: Low Risk  (10/11/2023)    [facility]    Housing Stability Vital Sign    • Unable to Pay for Housing in the Last Year: No    • Number of Places Lived in the Last Year: 1    • Unstable Housing in the Last Year: No     .  Family History:  Family History   Problem Relation Age of Onset   • Hypertension Mother    • No Known Problems Father    • Diabetes Maternal Grandmother    • No Known Problems Maternal Grandfather    • No Known Problems Paternal Grandmother    • No Known Problems Paternal Grandfather    • No Known Problems Sister    • No Known Problems Daughter    • No Known Problems Son    • No Known Problems Daughter    • No Known Problems Son    • No Known Problems Maternal Aunt    • No Known Problems Paternal Aunt    • Breast cancer Neg Hx   "  • Breast cancer additional onset Neg Hx    • Colon cancer Neg Hx    • Endometrial cancer Neg Hx    • Ovarian cancer Neg Hx    • BRCA 1/2 Neg Hx    • BRCA1 Negative Neg Hx    • BRCA1 Positive Neg Hx    • BRCA2 Negative Neg Hx    • BRCA2 Positive Neg Hx          Meds/Allergies     No current facility-administered medications for this visit.        Not in a hospital admission.      Allergies   Allergen Reactions   • Metoprolol Hives and Shortness Of Breath     Lightheadedness, cough   • Ibandronic Acid Other (See Comments)     Joint pain           Objective  Vitals: Visit Vitals  /80 (BP Location: Left arm, Patient Position: Sitting, Cuff Size: Standard)   Pulse 63   Ht 5' 2\" (1.575 m)   Wt 71 kg (156 lb 9.6 oz)   SpO2 97%   BMI 28.64 kg/m²   OB Status Postmenopausal   Smoking Status Former   BSA 1.72 m²      Vitals:    08/08/24 1353   Weight: 71 kg (156 lb 9.6 oz)     [unfilled]    Invasive Devices       Peripheral Intravenous Line  Duration             Peripheral IV 04/19/24 Left Antecubital 111 days                      ROS  Review of Systems   All other systems reviewed and are negative.  As described in my history of present illness        PHYSICAL EXAM  Physical Exam  Vitals reviewed.   Constitutional:       General: She is not in acute distress.     Appearance: Normal appearance. She is not ill-appearing.   HENT:      Head: Normocephalic and atraumatic.      Right Ear: External ear normal.      Left Ear: External ear normal.      Nose: Nose normal.      Mouth/Throat:      Comments: Posterior pharynx is slightly crowded  Eyes:      General: No scleral icterus.     Extraocular Movements: Extraocular movements intact.      Conjunctiva/sclera: Conjunctivae normal.      Pupils: Pupils are equal, round, and reactive to light.   Cardiovascular:      Rate and Rhythm: Normal rate and regular rhythm.      Pulses: Normal pulses.      Heart sounds: Normal heart sounds. No murmur heard.     No gallop.   Pulmonary:     "  Effort: Pulmonary effort is normal. No respiratory distress.      Breath sounds: Normal breath sounds. No wheezing or rales.   Abdominal:      General: Bowel sounds are normal. There is no distension.      Tenderness: There is no abdominal tenderness.   Musculoskeletal:         General: No swelling, tenderness or deformity.      Cervical back: Neck supple. No rigidity.   Skin:     Coloration: Skin is not jaundiced.      Findings: No bruising or lesion.   Neurological:      Mental Status: She is alert and oriented to person, place, and time. Mental status is at baseline.      Motor: No weakness.   Psychiatric:         Mood and Affect: Mood normal.         Behavior: Behavior normal.         Thought Content: Thought content normal.         Judgment: Judgment normal.         LAB RESULTS:    CBC:  WBC   Date Value Ref Range Status   04/20/2024 11.44 (H) 4.31 - 10.16 Thousand/uL Final     Hemoglobin   Date Value Ref Range Status   04/20/2024 12.6 11.5 - 15.4 g/dL Final   10/12/2023 13.0 11.5 - 14.5 g/dL Final     Hematocrit   Date Value Ref Range Status   04/20/2024 36.6 34.8 - 46.1 % Final   10/12/2023 39.1 35.0 - 43.0 % Final     MCV   Date Value Ref Range Status   04/20/2024 90 82 - 98 fL Final     Platelets   Date Value Ref Range Status   04/20/2024 221 149 - 390 Thousands/uL Final     RBC   Date Value Ref Range Status   04/20/2024 4.07 3.81 - 5.12 Million/uL Final     MCH   Date Value Ref Range Status   04/20/2024 31.0 26.8 - 34.3 pg Final     MCHC   Date Value Ref Range Status   04/20/2024 34.4 31.4 - 37.4 g/dL Final     RDW   Date Value Ref Range Status   04/20/2024 11.5 (L) 11.6 - 15.1 % Final     MPV   Date Value Ref Range Status   04/20/2024 9.6 8.9 - 12.7 fL Final     nRBC   Date Value Ref Range Status   04/02/2024 0 /100 WBCs Final       CMP:  Potassium   Date Value Ref Range Status   04/20/2024 3.5 3.5 - 5.3 mmol/L Final   10/12/2023 3.6 3.5 - 5.2 mmol/L Final     Chloride   Date Value Ref Range Status  "  04/20/2024 101 96 - 108 mmol/L Final   10/12/2023 98 (L) 100 - 109 mmol/L Final     Carbon Dioxide   Date Value Ref Range Status   10/12/2023 23 21 - 31 mmol/L Final     CO2   Date Value Ref Range Status   04/20/2024 25 21 - 32 mmol/L Final     BUN   Date Value Ref Range Status   04/20/2024 12 5 - 25 mg/dL Final   10/12/2023 12 7 - 25 mg/dL Final     Creatinine   Date Value Ref Range Status   04/20/2024 0.65 0.60 - 1.30 mg/dL Final     Comment:     Standardized to IDMS reference method   10/12/2023 0.87 0.40 - 1.10 mg/dL Final     Calcium   Date Value Ref Range Status   04/20/2024 9.1 8.4 - 10.2 mg/dL Final   10/12/2023 8.8 8.5 - 10.1 mg/dL Final     AST   Date Value Ref Range Status   04/02/2024 14 13 - 39 U/L Final     ALT   Date Value Ref Range Status   04/02/2024 11 7 - 52 U/L Final     Comment:     Specimen collection should occur prior to Sulfasalazine administration due to the potential for falsely depressed results.      Alkaline Phosphatase   Date Value Ref Range Status   04/02/2024 62 34 - 104 U/L Final     eGFRcr   Date Value Ref Range Status   10/12/2023 70 >59 Final     eGFR   Date Value Ref Range Status   04/20/2024 88 ml/min/1.73sq m Final        Magnesium:   No results found for: \"MG\", \"MAGNESIUM\"     A1C:  No results found for: \"HGBA1C\"     TSH:  TSH 3RD GENERATON   Date Value Ref Range Status   11/17/2023 1.748 0.450 - 4.500 uIU/mL Final     Comment:     The recommended reference ranges for TSH during pregnancy are as follows:   First trimester 0.100 to 2.500 uIU/mL   Second trimester  0.200 to 3.000 uIU/mL   Third trimester 0.300 to 3.000 uIU/m    Note: Normal ranges may not apply to patients who are transgender, non-binary, or whose legal sex, sex at birth, and gender identity differ.  Adult TSH (3rd generation) reference range follows the recommended guidelines of the American [employer] Association, January, 2020.        PT/INR:  Protime   Date Value Ref Range Status   04/20/2024 14.2 11.6 - " "14.5 seconds Final     INR   Date Value Ref Range Status   2024 1.11 0.84 - 1.19 Final       Lipid Panel:  Cholesterol   Date Value Ref Range Status   2023 178 See Comment mg/dL Final     Comment:     Cholesterol:         Pediatric <18 Years        Desirable          <170 mg/dL      Borderline High    170-199 mg/dL      High               >=200 mg/dL        Adult >=18 Years            Desirable        <200 mg/dL      Borderline High  200-239 mg/dL      High             >239 mg/dL       Triglycerides   Date Value Ref Range Status   2023 109 See Comment mg/dL Final     Comment:     Triglyceride:     0-9Y            <75mg/dL     10Y-17Y         <90 mg/dL       >=18Y     Normal          <150 mg/dL     Borderline High 150-199 mg/dL     High            200-499 mg/dL        Very High       >499 mg/dL    Specimen collection should occur prior to Metamizole administration due to the potential for falsely depressed results.     HDL, Direct   Date Value Ref Range Status   2023 70 >=50 mg/dL Final     Non-HDL-Chol (CHOL-HDL)   Date Value Ref Range Status   2023 108 mg/dl Final       Troponin:  No results found for: \"TROPONINI\"      Imaging:    [id number]        ALEX:  No results found for this or any previous visit.      Echocardiogram:  Results for orders placed during the hospital encounter of 23    Echo complete w/ contrast if indicated    Interpretation Summary  •  Left Ventricle: Left ventricular cavity size is normal. Wall thickness is normal. The left ventricular ejection fraction is 60%. Systolic function is normal. Wall motion is normal. Diastolic function is normal for age.  •  Right Ventricle: Right ventricular cavity size is normal. Systolic function is normal.      Stress Test:   No results found for this or any previous visit.      Cardiac Catheterization:  No results found for this or any previous visit.      HOLTER MONITOR: 24 HOUR/48 HOUR MONITORS  Results for orders " placed during the hospital encounter of 08/03/23    Holter monitor    Interpretation Summary  INDICATIONS: Palpitations    DESCRIPTION OF FINDINGS:  The patient was monitored for a total of 48 hours.  The patient was predominantly noted to be in sinus rhythm throughout the study.  The average heart rate was 71 beats per minute.  The heart rate ranged from a low of 45 beats per minute in sinus bradycardia at 8:19 AM to a maximum of 171 beats per minute in atrial fibrillation at 8:20 PM.    Ventricular ectopic activity consisted of 43 PVCs.  There was no sustained or nonsustained ventricular tachycardia.    Supraventricular ectopic activity consisted of 5251 PACs (2.6% of total beats).  Atrial fibrillation is present at an 8% burden  There were no significant pauses. The longest R-R interval was 2.1 seconds.  There was no evidence of advanced degree heart block.    The accompanying patient diary notes symptoms of shortness of breath. Correlation with the tracings at these times reveals rapid A-fib.    Impression  Predominantly sinus rhythm throughout.  Episodes of atrial fibrillation with rapid ventricular response total burden 8%.  These seem to correlate with symptoms of shortness of breath.  Rare PACs and PVCs      AMB Extended Holter Monitor: Zio XT/AT or BioTel  Results for orders placed in visit on 01/10/24    AMB extended holter monitor      Zio XT  12/14/2023 - 12/28/2023   Nader Benedict MD  1/10/2024  4:13 PM EST       Please notify pt there were some abnormal findings on her ziopatch. Nothing emergent.     Please ask her to schedule with the EP team of Afib specialists to further discuss her options. She should continue her current regimen including her AC now.        Thanks!     - Nader Benedict MD  1/10/2024  4:12 PM EST       Impression:     Patient had a min HR of 38 bpm, max HR of 235 bpm, and avg HR of 70 bpm.  Predominant underlying rhythm was Sinus Rhythm. 871 Supraventricular  Tachycardia runs  occurred, the run with the fastest interval lasting 5 beats with a  max rate of 235 bpm, the longest lasting 11.7 secs with an avg rate of 177 bpm.  Atrial Fibrillation occurred (1% burden), ranging from  bpm (avg of 140 bpm),  the longest lasting 35 mins 16 secs with an avg rate of 147 bpm.     1 Pause occurred lasting 3.5 secs (17 bpm).     Supraventricular Tachycardia and Atrial Fibrillation were  detected within +/- 45 seconds of symptomatic patient event(s). Isolated SVEs were  frequent (5.1%, 30843), SVE Couplets were rare (<1.0%, 4487), and SVE Triplets  were rare (<1.0%, 1600). Isolated VEs were rare (<1.0%, 4359), VE Couplets were  rare (<1.0%, 86), and VE Triplets were rare (<1.0%, 1). MD notification criteria for  Rapid Atrial Fibrillation met - report posted prior to notification per account request  (LDG).     The recording showed rapid AF as described above. 1 conversion pause noted.     Patient reported symptoms correlated with Afib, conversion pause, NSR and sinus bradycardia.     I reviewed all strips personally.                           DEVICE CHECK:     No results found for this or any previous visit.           Code Status: [unfilled]  Advance Directive and Living Will:      Power of :    POLST:      Counseling / Coordination of Care  Detailed discussion done with regards to management of [id number] medication, ablation, pacemaker    Patient is going to discuss at home and then make a final decision      Huang Kim MD

## 2024-10-04 DIAGNOSIS — R32 URINARY INCONTINENCE, UNSPECIFIED TYPE: ICD-10-CM

## 2024-10-04 RX ORDER — MIRABEGRON 50 MG/1
50 TABLET, EXTENDED RELEASE ORAL DAILY
Qty: 90 TABLET | Refills: 0 | Status: SHIPPED | OUTPATIENT
Start: 2024-10-04

## 2024-10-07 ENCOUNTER — VBI (OUTPATIENT)
Dept: ADMINISTRATIVE | Facility: OTHER | Age: 74
End: 2024-10-07

## 2024-10-07 NOTE — TELEPHONE ENCOUNTER
10/07/24 3:45 PM     Chart reviewed for CRC: Colonoscopy was/were not submitted to the patient's insurance.     Vickie Roca MA   [facility]

## 2024-10-16 DIAGNOSIS — E78.2 MIXED HYPERLIPIDEMIA: ICD-10-CM

## 2024-10-16 RX ORDER — PRAVASTATIN SODIUM 20 MG
TABLET ORAL
Qty: 90 TABLET | Refills: 0 | Status: SHIPPED | OUTPATIENT
Start: 2024-10-16

## 2024-10-18 DIAGNOSIS — I10 ESSENTIAL HYPERTENSION: ICD-10-CM

## 2024-10-18 DIAGNOSIS — I48.91 ATRIAL FIBRILLATION WITH RVR (HCC): ICD-10-CM

## 2024-10-18 RX ORDER — HYDROCHLOROTHIAZIDE 12.5 MG/1
25 CAPSULE ORAL EVERY MORNING
Qty: 180 CAPSULE | Refills: 1 | Status: SHIPPED | OUTPATIENT
Start: 2024-10-18 | End: 2026-04-11

## 2024-10-18 RX ORDER — DILTIAZEM HYDROCHLORIDE 120 MG/1
120 CAPSULE, EXTENDED RELEASE ORAL DAILY
Qty: 90 CAPSULE | Refills: 1 | Status: SHIPPED | OUTPATIENT
Start: 2024-10-18 | End: 2026-04-11

## 2024-11-04 ENCOUNTER — TELEPHONE (OUTPATIENT)
Age: 74
End: 2024-11-04

## 2024-11-04 ENCOUNTER — PATIENT MESSAGE (OUTPATIENT)
Dept: FAMILY MEDICINE CLINIC | Facility: CLINIC | Age: 74
End: 2024-11-04

## 2024-11-04 DIAGNOSIS — I48.0 PAROXYSMAL ATRIAL FIBRILLATION (HCC): ICD-10-CM

## 2024-11-04 DIAGNOSIS — E78.2 MIXED HYPERLIPIDEMIA: ICD-10-CM

## 2024-11-04 DIAGNOSIS — I10 ESSENTIAL HYPERTENSION: Primary | ICD-10-CM

## 2024-11-04 DIAGNOSIS — E55.9 VITAMIN D DEFICIENCY: ICD-10-CM

## 2024-11-18 ENCOUNTER — APPOINTMENT (OUTPATIENT)
Dept: LAB | Facility: CLINIC | Age: 74
End: 2024-11-18
Payer: COMMERCIAL

## 2024-11-18 ENCOUNTER — RESULTS FOLLOW-UP (OUTPATIENT)
Dept: FAMILY MEDICINE CLINIC | Facility: CLINIC | Age: 74
End: 2024-11-18

## 2024-11-18 DIAGNOSIS — E55.9 VITAMIN D DEFICIENCY: ICD-10-CM

## 2024-11-18 DIAGNOSIS — I48.0 PAROXYSMAL ATRIAL FIBRILLATION (HCC): ICD-10-CM

## 2024-11-18 DIAGNOSIS — E78.2 MIXED HYPERLIPIDEMIA: ICD-10-CM

## 2024-11-18 DIAGNOSIS — I10 ESSENTIAL HYPERTENSION: ICD-10-CM

## 2024-11-18 LAB
25(OH)D3 SERPL-MCNC: 31.8 NG/ML (ref 30–100)
ALBUMIN SERPL BCG-MCNC: 4.4 G/DL (ref 3.5–5)
ALP SERPL-CCNC: 68 U/L (ref 34–104)
ALT SERPL W P-5'-P-CCNC: 11 U/L (ref 7–52)
ANION GAP SERPL CALCULATED.3IONS-SCNC: 7 MMOL/L (ref 4–13)
AST SERPL W P-5'-P-CCNC: 15 U/L (ref 13–39)
BASOPHILS # BLD AUTO: 0.04 THOUSANDS/ÂΜL (ref 0–0.1)
BASOPHILS NFR BLD AUTO: 1 % (ref 0–1)
BILIRUB SERPL-MCNC: 0.48 MG/DL (ref 0.2–1)
BUN SERPL-MCNC: 12 MG/DL (ref 5–25)
CALCIUM SERPL-MCNC: 9.7 MG/DL (ref 8.4–10.2)
CHLORIDE SERPL-SCNC: 103 MMOL/L (ref 96–108)
CHOLEST SERPL-MCNC: 184 MG/DL (ref ?–200)
CO2 SERPL-SCNC: 29 MMOL/L (ref 21–32)
CREAT SERPL-MCNC: 0.89 MG/DL (ref 0.6–1.3)
EOSINOPHIL # BLD AUTO: 0.16 THOUSAND/ÂΜL (ref 0–0.61)
EOSINOPHIL NFR BLD AUTO: 3 % (ref 0–6)
ERYTHROCYTE [DISTWIDTH] IN BLOOD BY AUTOMATED COUNT: 11.8 % (ref 11.6–15.1)
GFR SERPL CREATININE-BSD FRML MDRD: 64 ML/MIN/1.73SQ M
GLUCOSE P FAST SERPL-MCNC: 96 MG/DL (ref 65–99)
HCT VFR BLD AUTO: 38.3 % (ref 34.8–46.1)
HDLC SERPL-MCNC: 75 MG/DL
HGB BLD-MCNC: 13 G/DL (ref 11.5–15.4)
IMM GRANULOCYTES # BLD AUTO: 0.02 THOUSAND/UL (ref 0–0.2)
IMM GRANULOCYTES NFR BLD AUTO: 0 % (ref 0–2)
LDLC SERPL CALC-MCNC: 92 MG/DL (ref 0–100)
LYMPHOCYTES # BLD AUTO: 1.14 THOUSANDS/ÂΜL (ref 0.6–4.47)
LYMPHOCYTES NFR BLD AUTO: 18 % (ref 14–44)
MCH RBC QN AUTO: 31.2 PG (ref 26.8–34.3)
MCHC RBC AUTO-ENTMCNC: 33.9 G/DL (ref 31.4–37.4)
MCV RBC AUTO: 92 FL (ref 82–98)
MONOCYTES # BLD AUTO: 0.82 THOUSAND/ÂΜL (ref 0.17–1.22)
MONOCYTES NFR BLD AUTO: 13 % (ref 4–12)
NEUTROPHILS # BLD AUTO: 4.06 THOUSANDS/ÂΜL (ref 1.85–7.62)
NEUTS SEG NFR BLD AUTO: 65 % (ref 43–75)
NONHDLC SERPL-MCNC: 109 MG/DL
NRBC BLD AUTO-RTO: 0 /100 WBCS
PLATELET # BLD AUTO: 285 THOUSANDS/UL (ref 149–390)
PMV BLD AUTO: 9.3 FL (ref 8.9–12.7)
POTASSIUM SERPL-SCNC: 3.8 MMOL/L (ref 3.5–5.3)
PROT SERPL-MCNC: 7 G/DL (ref 6.4–8.4)
RBC # BLD AUTO: 4.17 MILLION/UL (ref 3.81–5.12)
SODIUM SERPL-SCNC: 139 MMOL/L (ref 135–147)
TRIGL SERPL-MCNC: 87 MG/DL (ref ?–150)
TSH SERPL DL<=0.05 MIU/L-ACNC: 1.88 UIU/ML (ref 0.45–4.5)
WBC # BLD AUTO: 6.24 THOUSAND/UL (ref 4.31–10.16)

## 2024-11-18 PROCEDURE — 36415 COLL VENOUS BLD VENIPUNCTURE: CPT

## 2024-11-18 PROCEDURE — 85025 COMPLETE CBC W/AUTO DIFF WBC: CPT

## 2024-11-18 PROCEDURE — 84443 ASSAY THYROID STIM HORMONE: CPT

## 2024-11-18 PROCEDURE — 82306 VITAMIN D 25 HYDROXY: CPT

## 2024-11-18 PROCEDURE — 80061 LIPID PANEL: CPT

## 2024-11-18 PROCEDURE — 80053 COMPREHEN METABOLIC PANEL: CPT

## 2024-12-03 ENCOUNTER — OFFICE VISIT (OUTPATIENT)
Dept: FAMILY MEDICINE CLINIC | Facility: CLINIC | Age: 74
End: 2024-12-03
Payer: COMMERCIAL

## 2024-12-03 ENCOUNTER — RESULTS FOLLOW-UP (OUTPATIENT)
Dept: FAMILY MEDICINE CLINIC | Facility: CLINIC | Age: 74
End: 2024-12-03

## 2024-12-03 ENCOUNTER — HOSPITAL ENCOUNTER (OUTPATIENT)
Dept: RADIOLOGY | Facility: HOSPITAL | Age: 74
Discharge: HOME | End: 2024-12-03
Payer: COMMERCIAL

## 2024-12-03 VITALS
WEIGHT: 155.6 LBS | RESPIRATION RATE: 20 BRPM | OXYGEN SATURATION: 96 % | HEART RATE: 60 BPM | DIASTOLIC BLOOD PRESSURE: 84 MMHG | BODY MASS INDEX: 27.57 KG/M2 | TEMPERATURE: 97.8 F | HEIGHT: 63 IN | SYSTOLIC BLOOD PRESSURE: 162 MMHG

## 2024-12-03 DIAGNOSIS — E55.9 VITAMIN D DEFICIENCY: ICD-10-CM

## 2024-12-03 DIAGNOSIS — R05.9 COUGH, UNSPECIFIED TYPE: ICD-10-CM

## 2024-12-03 DIAGNOSIS — Z78.0 POSTMENOPAUSAL: ICD-10-CM

## 2024-12-03 DIAGNOSIS — I10 BENIGN ESSENTIAL HYPERTENSION: ICD-10-CM

## 2024-12-03 DIAGNOSIS — Z12.11 SCREENING FOR COLON CANCER: ICD-10-CM

## 2024-12-03 DIAGNOSIS — Z12.31 OTHER SCREENING MAMMOGRAM: ICD-10-CM

## 2024-12-03 DIAGNOSIS — E78.2 MIXED HYPERLIPIDEMIA: ICD-10-CM

## 2024-12-03 DIAGNOSIS — I48.91 ATRIAL FIBRILLATION, UNSPECIFIED TYPE (HCC): ICD-10-CM

## 2024-12-03 DIAGNOSIS — Z00.00 MEDICARE ANNUAL WELLNESS VISIT, SUBSEQUENT: Primary | ICD-10-CM

## 2024-12-03 DIAGNOSIS — Z79.899 HIGH RISK MEDICATION USE: ICD-10-CM

## 2024-12-03 PROCEDURE — 99214 OFFICE O/P EST MOD 30 MIN: CPT | Performed by: FAMILY MEDICINE

## 2024-12-03 PROCEDURE — G0439 PPPS, SUBSEQ VISIT: HCPCS | Performed by: FAMILY MEDICINE

## 2024-12-03 PROCEDURE — 71046 X-RAY EXAM CHEST 2 VIEWS: CPT

## 2024-12-03 RX ORDER — FLUCONAZOLE 150 MG/1
TABLET ORAL
Qty: 2 TABLET | Refills: 0 | Status: SHIPPED | OUTPATIENT
Start: 2024-12-03 | End: 2024-12-18

## 2024-12-03 RX ORDER — AZITHROMYCIN 250 MG/1
TABLET, FILM COATED ORAL
Qty: 6 TABLET | Refills: 0 | Status: SHIPPED | OUTPATIENT
Start: 2024-12-03 | End: 2024-12-07

## 2024-12-03 RX ORDER — PREDNISONE 50 MG/1
50 TABLET ORAL DAILY
Qty: 5 TABLET | Refills: 0 | Status: SHIPPED | OUTPATIENT
Start: 2024-12-03

## 2024-12-03 NOTE — PATIENT INSTRUCTIONS
Augmentin twice daily  Zithromax x 5 days  Albuterol as needed  Diflucan as needed  Prednisone 50mg daily x 5 days  Chest xray

## 2024-12-03 NOTE — PROGRESS NOTES
Name: Amairani Vergara      : 1950      MRN: [id number]  Encounter Provider: Dania Dorman DO  Encounter Date: 12/3/2024   Encounter department: [facility]    Assessment & Plan  Medicare annual wellness visit, subsequent  Obtain cologuard  Hold on any imm's due to illness  Declines dexa  Otherwise up to date preventively         Other screening mammogram    Orders:    Mammo screening bilateral w 3d and cad; Future    Screening for colon cancer    Orders:    Cologuard    Postmenopausal  Declines dexa       Cough, unspecified type  Entire fam sick.  Known mycoplasma exposure  Zithromax/augmentin for sx  Check cxr  Prednisone x 5 days  F/u 1 week  Orders:    XR chest pa and lateral; Future    azithromycin (ZITHROMAX) 250 mg tablet; 2 tabs today and 1 tab every day after    amoxicillin-clavulanate (AUGMENTIN) 875-125 mg per tablet; Take 1 tablet by mouth every 12 (twelve) hours for 7 days    predniSONE 50 mg tablet; Take 1 tablet (50 mg total) by mouth daily    High risk medication use    Orders:    fluconazole (DIFLUCAN) 150 mg tablet; Take at onset of symptoms;  may take 1 dose more if sx continue after 3 days    Benign essential hypertension         Atrial fibrillation, unspecified type (HCC)  Rate-controlled  Continue current meds       Vitamin D deficiency  Vit d at goal on most recent labs       Mixed hyperlipidemia  Appropriate control  Continue pravachol         Depression Screening and Follow-up Plan: Patient was screened for depression during today's encounter. They screened negative with a PHQ-2 score of 0.    Urinary Incontinence Plan of Care: counseling topics discussed: limiting fluid intake 3-4 hours before bed.       Preventive health issues were discussed with patient, and age appropriate screening tests were ordered as noted in patient's After Visit Summary. Personalized health advice and appropriate referrals for health education or preventive services given if needed, as  noted in patient's After Visit Summary.    History of Present Illness     Cough  Associated symptoms include a fever. Pertinent negatives include no chest pain, chills, ear pain, eye redness, headaches, myalgias, postnasal drip, rash, rhinorrhea, sore throat or shortness of breath.        Pt presents for MAW and f/u/acute care    From a preventive standpoint, pt declines flu shot.  Declines dexa.  Due for cologuard.  Up to date on mammogram.  Sees dentist.  Very active at baseline.  Has living will    From a problem standpoint, pt has had severe productive cough.  Can't lay flat as it worsens cough.  This has been present for the last week.  Isn't improving.  Family sick with similar.  Low-grade fevers.  Easy fatiguability/shortness of breath with exertion.  Has albuterol at home but hasn't used.  Was sick 3 weeks ago.  Got better over about 10 days, then was better for a week and these sx reoccurred.  Daughter feels like she never truly resolved in between.    Bps at home reviewed and appropriate.  Higher in office today.  No chest pain, n/v, abd pain, visual changes, paresthesias, weakness.    Labs reviewed.  Lipids reviewed and are at goal.  Tolerating meds.    Hx afib.  Seeing cardiology.  On [facility]        Patient Care Team:  Dania Dorman DO as PCP - General (Family Medicine)    Review of Systems   Constitutional:  Positive for fatigue and fever. Negative for chills and unexpected weight change.   HENT:  Negative for congestion, ear pain, hearing loss, postnasal drip, rhinorrhea, sinus pressure, sinus pain, sore throat, trouble swallowing and voice change.    Eyes:  Negative for pain, redness and visual disturbance.   Respiratory:  Positive for cough. Negative for shortness of breath.    Cardiovascular:  Negative for chest pain, palpitations and leg swelling.   Gastrointestinal:  Negative for abdominal pain, constipation, diarrhea and nausea.   Endocrine: Negative for cold intolerance, heat intolerance,  polydipsia and polyuria.   Genitourinary:  Negative for dysuria, frequency and urgency.   Musculoskeletal:  Negative for arthralgias, joint swelling and myalgias.   Skin:  Negative for rash.        No suspicious lesions   Neurological:  Negative for weakness, numbness and headaches.   Hematological:  Negative for adenopathy.     Medical History Reviewed by provider this encounter:  Tobacco  Allergies  Meds  Problems  Med Hx  Surg Hx  Fam Hx       Annual Wellness Visit Questionnaire   Amairani is here for her Subsequent Wellness visit.     Health Risk Assessment:   Patient rates overall health as very good. Patient feels that their physical health rating is same. Patient is very satisfied with their life. Eyesight was rated as same. Hearing was rated as same. Patient feels that their emotional and mental health rating is same. Patients states they are never, rarely angry. Patient states they are never, rarely unusually tired/fatigued. Pain experienced in the last 7 days has been none. Patient states that she has experienced no weight loss or gain in last 6 months.     Depression Screening:   PHQ-2 Score: 0      Fall Risk Screening:   In the past year, patient has experienced: no history of falling in past year      Urinary Incontinence Screening:   Patient has leaked urine accidently in the last six months. Take [employer]    Home Safety:  Patient does not have trouble with stairs inside or outside of their home. Patient has working smoke alarms and has working carbon monoxide detector. Home safety hazards include: none.     Nutrition:   Current diet is Regular.     Medications:   Patient is not currently taking any over-the-counter supplements. Patient is able to manage medications.     Activities of Daily Living (ADLs)/Instrumental Activities of Daily Living (IADLs):   Walk and transfer into and out of bed and chair?: Yes  Dress and groom yourself?: Yes    Bathe or shower yourself?: Yes    Feed yourself? Yes  Do  your laundry/housekeeping?: Yes  Manage your money, pay your bills and track your expenses?: Yes  Make your own meals?: Yes    Do your own shopping?: Yes    Previous Hospitalizations:   Any hospitalizations or ED visits within the last 12 months?: No      Advance Care Planning:   Living will: Yes    Durable POA for healthcare: Yes    Advanced directive: Yes      Cognitive Screening:   Provider or family/friend/caregiver concerned regarding cognition?: No    PREVENTIVE SCREENINGS      Cardiovascular Screening:    General: Screening Not Indicated and History Lipid Disorder      Diabetes Screening:     General: Screening Current      Colorectal Cancer Screening:     General: Risks and Benefits Discussed    Due for: Cologuard      Breast Cancer Screening:     General: Screening Current      Cervical Cancer Screening:    General: Screening Not Indicated      Osteoporosis Screening:    General: Screening Not Indicated, History Osteoporosis and Patient Declines      Abdominal Aortic Aneurysm (AAA) Screening:        General: Screening Not Indicated      Lung Cancer Screening:     General: Screening Not Indicated      Hepatitis C Screening:    General: Screening Current    Screening, Brief Intervention, and Referral to Treatment (SBIRT)    Screening  Typical number of drinks in a day: 0  Typical number of drinks in a week: 0  Interpretation: Low risk drinking behavior.    AUDIT-C Screenin) How often did you have a drink containing alcohol in the past year? monthly or less  2) How many drinks did you have on a typical day when you were drinking in the past year? 1 to 2  3) How often did you have 6 or more drinks on one occasion in the past year? never    AUDIT-C Score: 1  Interpretation: Score 0-2 (female): Negative screen for alcohol misuse    Single Item Drug Screening:  How often have you used an illegal drug (including marijuana) or a prescription medication for non-medical reasons in the past year? never    Single  "Item Drug Screen Score: 0  Interpretation: Negative screen for possible drug use disorder    Social Drivers of Health     Financial Resource Strain: Medium Risk (11/29/2023)    Overall Financial Resource Strain (CARDIA)     Difficulty of Paying Living Expenses: Somewhat hard   Food Insecurity: No Food Insecurity (12/2/2024)    Hunger Vital Sign     Worried About Running Out of Food in the Last Year: Never true     Ran Out of Food in the Last Year: Never true   Transportation Needs: No Transportation Needs (12/2/2024)    PRAPARE - Transportation     Lack of Transportation (Medical): No     Lack of Transportation (Non-Medical): No   Housing Stability: Low Risk  (12/2/2024)    Housing Stability Vital Sign     Unable to Pay for Housing in the Last Year: No     Number of Times Moved in the Last Year: 0     Homeless in the Last Year: No   Utilities: Not At Risk (12/2/2024)    [facility] Utilities     Threatened with loss of utilities: No     No results found.    Objective   /84 (BP Location: Right arm, Patient Position: Sitting, Cuff Size: Standard)   Pulse 60   Temp 97.8 °F (36.6 °C)   Resp 20   Ht 5' 2.5\" (1.588 m)   Wt 70.6 kg (155 lb 9.6 oz)   SpO2 96%   BMI 28.01 kg/m²     Physical Exam  Constitutional:       Appearance: Normal appearance.   HENT:      Head: Normocephalic and atraumatic.      Right Ear: Tympanic membrane, ear canal and external ear normal.      Left Ear: Tympanic membrane, ear canal and external ear normal.      Nose: Nose normal. No congestion.      Mouth/Throat:      Mouth: Mucous membranes are moist.      Pharynx: No oropharyngeal exudate or posterior oropharyngeal erythema.   Eyes:      Extraocular Movements: Extraocular movements intact.      Conjunctiva/sclera: Conjunctivae normal.      Pupils: Pupils are equal, round, and reactive to light.   Neck:      Vascular: No carotid bruit.   Cardiovascular:      Rate and Rhythm: Normal rate and regular rhythm.      Heart sounds: No murmur " heard.     No friction rub. No gallop.   Pulmonary:      Effort: Pulmonary effort is normal.      Comments: Poor air mvmt, but no wheezing/rales/rhonchi  Abdominal:      General: Abdomen is flat. There is no distension.      Palpations: Abdomen is soft.      Tenderness: There is no abdominal tenderness.   Musculoskeletal:      Cervical back: Neck supple.   Lymphadenopathy:      Cervical: No cervical adenopathy.   Neurological:      General: No focal deficit present.      Mental Status: She is alert and oriented to person, place, and time.      Cranial Nerves: No cranial nerve deficit.      Motor: No weakness.      Deep Tendon Reflexes: Reflexes normal.

## 2024-12-10 ENCOUNTER — OFFICE VISIT (OUTPATIENT)
Dept: FAMILY MEDICINE CLINIC | Facility: CLINIC | Age: 74
End: 2024-12-10
Payer: COMMERCIAL

## 2024-12-10 VITALS
DIASTOLIC BLOOD PRESSURE: 64 MMHG | RESPIRATION RATE: 20 BRPM | HEIGHT: 63 IN | TEMPERATURE: 98.5 F | OXYGEN SATURATION: 97 % | HEART RATE: 72 BPM | SYSTOLIC BLOOD PRESSURE: 142 MMHG | BODY MASS INDEX: 27.78 KG/M2 | WEIGHT: 156.8 LBS

## 2024-12-10 DIAGNOSIS — R05.9 COUGH, UNSPECIFIED TYPE: Primary | ICD-10-CM

## 2024-12-10 PROCEDURE — 99213 OFFICE O/P EST LOW 20 MIN: CPT | Performed by: FAMILY MEDICINE

## 2024-12-10 PROCEDURE — G2211 COMPLEX E/M VISIT ADD ON: HCPCS | Performed by: FAMILY MEDICINE

## 2024-12-10 NOTE — PROGRESS NOTES
"Name: Amairani Vergara      : 1950      MRN: [id number]  Encounter Provider: Dania Dorman DO  Encounter Date: 12/10/2024   Encounter department: [facility]  :  Assessment & Plan  Cough, unspecified type  Had mycoplasma exposure  Now s/p augmentin and zithromax   S/p prednisone  Improving but now fatigued--suspect this will improve over the next few weeks  Rest, fluids, call if sx don't continue to improve.                History of Present Illness     HPI  Pt presents for f/u last week cough/congestion.  Has finished abx and prednisone.  Feeling much better.  Cough, congestion better.  No fevers.  No shortness of breath.  +fatigue which is fairly severe.  Otherwise improving.    Review of Systems  See hpi    Objective   /64   Pulse 72   Temp 98.5 °F (36.9 °C) (Temporal)   Resp 20   Ht 5' 2.5\" (1.588 m)   Wt 71.1 kg (156 lb 12.8 oz)   SpO2 97%   BMI 28.22 kg/m²      Physical Exam  Constitutional:       Appearance: Normal appearance.   HENT:      Head: Normocephalic and atraumatic.      Right Ear: Tympanic membrane, ear canal and external ear normal.      Left Ear: Tympanic membrane, ear canal and external ear normal.      Nose: No congestion.      Mouth/Throat:      Mouth: Mucous membranes are moist.      Pharynx: No oropharyngeal exudate or posterior oropharyngeal erythema.   Eyes:      Extraocular Movements: Extraocular movements intact.      Conjunctiva/sclera: Conjunctivae normal.      Pupils: Pupils are equal, round, and reactive to light.   Cardiovascular:      Rate and Rhythm: Normal rate and regular rhythm.      Heart sounds: No murmur heard.     No friction rub. No gallop.   Pulmonary:      Effort: Pulmonary effort is normal.      Breath sounds: Normal breath sounds. No wheezing, rhonchi or rales.   Neurological:      General: No focal deficit present.      Mental Status: She is alert and oriented to person, place, and time.         "

## 2025-01-06 DIAGNOSIS — R32 URINARY INCONTINENCE, UNSPECIFIED TYPE: ICD-10-CM

## 2025-01-07 RX ORDER — MIRABEGRON 50 MG/1
50 TABLET, FILM COATED, EXTENDED RELEASE ORAL DAILY
Qty: 90 TABLET | Refills: 1 | Status: SHIPPED | OUTPATIENT
Start: 2025-01-07 | End: 2025-01-17 | Stop reason: SDUPTHER

## 2025-01-11 LAB — COLOGUARD RESULT REPORTABLE: NORMAL

## 2025-01-14 DIAGNOSIS — E78.2 MIXED HYPERLIPIDEMIA: ICD-10-CM

## 2025-01-14 RX ORDER — PRAVASTATIN SODIUM 20 MG
20 TABLET ORAL DAILY
Qty: 90 TABLET | Refills: 1 | Status: SHIPPED | OUTPATIENT
Start: 2025-01-14

## 2025-01-17 DIAGNOSIS — R32 URINARY INCONTINENCE, UNSPECIFIED TYPE: ICD-10-CM

## 2025-01-17 RX ORDER — MIRABEGRON 50 MG/1
50 TABLET, FILM COATED, EXTENDED RELEASE ORAL DAILY
Qty: 90 TABLET | Refills: 1 | Status: SHIPPED | OUTPATIENT
Start: 2025-01-17

## 2025-02-06 LAB — COLOGUARD RESULT REPORTABLE: NEGATIVE

## 2025-02-06 NOTE — RESULT ENCOUNTER NOTE
Dr. Dandy Morel is currently out of the office.      Negative Cologuard colon cancer screening test.  Repeat colon cancer screening in 3 years.    Take care,    Dr. Benites    Message sent to patient via [facility] patient portal.

## 2025-03-02 DIAGNOSIS — I48.91 ATRIAL FIBRILLATION WITH RVR (HCC): ICD-10-CM

## 2025-03-04 RX ORDER — DILTIAZEM HYDROCHLORIDE 120 MG/1
120 CAPSULE, EXTENDED RELEASE ORAL DAILY
Qty: 90 CAPSULE | Refills: 1 | Status: SHIPPED | OUTPATIENT
Start: 2025-03-04 | End: 2026-08-26

## 2025-03-17 ENCOUNTER — VBI (OUTPATIENT)
Dept: ADMINISTRATIVE | Facility: OTHER | Age: 75
End: 2025-03-17

## 2025-03-17 NOTE — TELEPHONE ENCOUNTER
03/17/25 12:35 PM     Chart reviewed for Blood Pressure ; nothing is submitted to the patient's insurance at this time.     Fausto Grider MA   [facility]

## 2025-03-27 ENCOUNTER — VBI (OUTPATIENT)
Dept: ADMINISTRATIVE | Facility: OTHER | Age: 75
End: 2025-03-27

## 2025-03-27 NOTE — TELEPHONE ENCOUNTER
03/27/25 10:21 AM     Chart reviewed for CRC: Cologuard and CRC: Colonoscopy was/were submitted to the patient's insurance.     Vickie Roca MA   [facility]

## 2025-04-04 ENCOUNTER — OFFICE VISIT (OUTPATIENT)
Dept: CARDIOLOGY CLINIC | Facility: CLINIC | Age: 75
End: 2025-04-04
Payer: COMMERCIAL

## 2025-04-04 VITALS
BODY MASS INDEX: 28.98 KG/M2 | HEART RATE: 66 BPM | WEIGHT: 161 LBS | OXYGEN SATURATION: 97 % | SYSTOLIC BLOOD PRESSURE: 178 MMHG | DIASTOLIC BLOOD PRESSURE: 70 MMHG

## 2025-04-04 DIAGNOSIS — I48.91 ATRIAL FIBRILLATION WITH RVR (HCC): ICD-10-CM

## 2025-04-04 DIAGNOSIS — Z98.890 S/P ABLATION OF ATRIAL FIBRILLATION: ICD-10-CM

## 2025-04-04 DIAGNOSIS — I10 BENIGN ESSENTIAL HYPERTENSION: ICD-10-CM

## 2025-04-04 DIAGNOSIS — I48.0 PAROXYSMAL ATRIAL FIBRILLATION (HCC): Primary | ICD-10-CM

## 2025-04-04 DIAGNOSIS — Z86.79 S/P ABLATION OF ATRIAL FIBRILLATION: ICD-10-CM

## 2025-04-04 DIAGNOSIS — E78.2 MIXED HYPERLIPIDEMIA: ICD-10-CM

## 2025-04-04 PROCEDURE — 93000 ELECTROCARDIOGRAM COMPLETE: CPT | Performed by: INTERNAL MEDICINE

## 2025-04-04 PROCEDURE — 99214 OFFICE O/P EST MOD 30 MIN: CPT | Performed by: INTERNAL MEDICINE

## 2025-04-04 RX ORDER — DILTIAZEM HYDROCHLORIDE 120 MG/1
120 CAPSULE, EXTENDED RELEASE ORAL DAILY
Start: 2025-04-04 | End: 2026-09-26

## 2025-04-04 NOTE — PROGRESS NOTES
Cardiology Follow Up    Amairani Vergara  1950  [id number]  [facility]  [address]    [facility] [id number] [phone]    1. Paroxysmal atrial fibrillation (HCC)  POCT ECG      2. Benign essential hypertension  Ambulatory referral to Cardiology      3. Mixed hyperlipidemia  Ambulatory referral to Cardiology          Diagnoses and all orders for this visit:    Paroxysmal atrial fibrillation (HCC)  -     POCT ECG    Benign essential hypertension  -     Ambulatory referral to Cardiology    Mixed hyperlipidemia  -     Ambulatory referral to Cardiology      I had the pleasure of seeing Amairani Vergara for a follow up visit.     INTERVAL HISTORY: first visit here    History of the presenting illness, Discussion/Summary and My Plan are as follows:::    Amairani is a pleasant 75-year-old lady with hypertension, dyslipidemia, paroxysmal atrial fibrillation, status post PVI/ablation in April 2024 and now essentially free of atrial fibrillation.  While she was considering different options for rhythm control, a nuclear stress test was also ordered but ultimately she decided on ablation.    She keeps herself busy walking a good amount at home, has stairs at home, has a 5 acre lot that she walks around and takes care of her grandchildren aged 9 and 11 with no limitations or symptoms.      She feels that she has whitecoat hypertension and blood pressures are elevated even on the second reading here.    ECG today shows normal sinus rhythm.    Normal cardiac exam    Plan:    Atrial fibrillation: Paroxysmal, following a rhythm control strategy, status post ablation 1 year ago and maintaining sinus rhythm.  Continue apixaban for anticoagulation and diltiazem 120 mg daily for rate control    Hypertension: Elevated here but she feels it is consistently well-controlled at home based on her home log that she brought here.  I advised her to  check her machine at her next doctors visit for accuracy.    Dyslipidemia: Well-controlled on statin, no changes    Follow-up in 12 months.     Latest Reference Range & Units 24 09:58   Cholesterol See Comment mg/dL 184   Triglycerides See Comment mg/dL 87   HDL >=50 mg/dL 75   Non-HDL Cholesterol mg/dl 109   LDL Calculated 0 - 100 mg/dL 92      Latest Reference Range & Units 24 09:58   BUN 5 - 25 mg/dL 12   Creatinine 0.60 - 1.30 mg/dL 0.89      Latest Reference Range & Units 24 09:58   Hemoglobin 11.5 - 15.4 g/dL 13.0   Hematocrit 34.8 - 46.1 % 38.3       Patient Active Problem List   Diagnosis    Benign essential hypertension    Hyperlipidemia    Osteoporosis    Chronic pain of right knee    Atrial fibrillation (HCC)    Osteoarthritis of right knee    s/p AFib ablation (PVI, posterior wall) 2024     Past Medical History:   Diagnosis Date    Arthritis 2021    R knee replacement scheduled      Cataract     bilateral     Hyperlipidemia     Hypertension     Osteopenia     s/p AFib ablation (PVI, posterio wall) 2024    Urinary incontinence      Social History     Socioeconomic History    Marital status:      Spouse name: Mohsen     Number of children: 3    Years of education: Not on file    Highest education level: Not on file   Occupational History    Occupation: Retired    Tobacco Use    Smoking status: Former     Current packs/day: 0.00     Average packs/day: 0.3 packs/day for 10.0 years (2.5 ttl pk-yrs)     Types: Cigarettes     Start date: 1975     Quit date: 1985     Years since quittin.4     Passive exposure: Never    Smokeless tobacco: Never   Vaping Use    Vaping status: Never Used   Substance and Sexual Activity    Alcohol use: Yes    Drug use: Never    Sexual activity: Not Currently     Birth control/protection: Post-menopausal   Other Topics Concern    Not on file   Social History Narrative    3 children, Felicia Sandoval Christopher         RN     Social Drivers of Health     Financial Resource Strain: Medium Risk (11/29/2023)    Overall Financial Resource Strain (CARDIA)     Difficulty of Paying Living Expenses: Somewhat hard   Food Insecurity: No Food Insecurity (12/2/2024)    Hunger Vital Sign     Worried About Running Out of Food in the Last Year: Never true     Ran Out of Food in the Last Year: Never true   Transportation Needs: No Transportation Needs (12/2/2024)    PRAPARE - Transportation     Lack of Transportation (Medical): No     Lack of Transportation (Non-Medical): No   Physical Activity: Not on file   Stress: Not on file   Social Connections: Not on file   Intimate Partner Violence: Not At Risk (10/11/2023)    [facility]    Humiliation, Afraid, Rape, and Kick questionnaire     Fear of Current or Ex-Partner: No     Emotionally Abused: No     Physically Abused: No     Sexually Abused: No   Housing Stability: Low Risk  (12/2/2024)    Housing Stability Vital Sign     Unable to Pay for Housing in the Last Year: No     Number of Times Moved in the Last Year: 0     Homeless in the Last Year: No      Family History   Problem Relation Age of Onset    Hypertension Mother     No Known Problems Father     Diabetes Maternal Grandmother     No Known Problems Maternal Grandfather     No Known Problems Paternal Grandmother     No Known Problems Paternal Grandfather     No Known Problems Sister     No Known Problems Daughter     No Known Problems Son     No Known Problems Daughter     No Known Problems Son     No Known Problems Maternal Aunt     No Known Problems Paternal Aunt     Breast cancer Neg Hx     Breast cancer additional onset Neg Hx     Colon cancer Neg Hx     Endometrial cancer Neg Hx     Ovarian cancer Neg Hx     BRCA 1/2 Neg Hx     BRCA1 Negative Neg Hx     BRCA1 Positive Neg Hx     BRCA2 Negative Neg Hx     BRCA2 Positive Neg Hx      Past Surgical History:   Procedure  Laterality Date    CARDIAC ELECTROPHYSIOLOGY PROCEDURE N/A 04/19/2024    Procedure: Cardiac eps/afib ablation;  Surgeon: Huang Kim MD;  Location: [facility];  Service: Cardiology    CATARACT EXTRACTION Left 01/28/2022    [facility], Dr. Monet    CATARACT EXTRACTION Right 01/14/2022    [facility], Dr. Monet    EYE SURGERY  02/2022    [facility]    INCONTINENCE SURGERY      REPLACEMENT TOTAL KNEE Right 10/11/2023    [facility], Dr. Murtaza Barr    TUBAL LIGATION         Current Outpatient Medications:     apixaban (Eliquis) 5 mg, Take 1 tablet (5 mg total) by mouth 2 (two) times a day, Disp: 180 tablet, Rfl: 3    clobetasol (TEMOVATE) 0.05 % ointment, Apply topically 2 (two) times a week, Disp: 30 g, Rfl: 1    diltiazem (DILACOR XR) 120 MG 24 hr capsule, Take 1 capsule (120 mg total) by mouth daily, Disp: 90 capsule, Rfl: 1    hydroCHLOROthiazide 12.5 mg capsule, Take 2 capsules (25 mg total) by mouth every morning, Disp: 180 capsule, Rfl: 1    Myrbetriq 50 MG TB24, Take 1 tablet (50 mg total) by mouth daily, Disp: 90 tablet, Rfl: 1    pravastatin (PRAVACHOL) 20 mg tablet, TAKE 1 TABLET DAILY, Disp: 90 tablet, Rfl: 1    predniSONE 50 mg tablet, Take 1 tablet (50 mg total) by mouth daily (Patient not taking: Reported on 4/4/2025), Disp: 5 tablet, Rfl: 0  Allergies   Allergen Reactions    Metoprolol Hives and Shortness Of Breath     Lightheadedness, cough    Ibandronate Other (See Comments)     Joint pain       Imaging: No results found.    Review of Systems:  Review of Systems   Constitutional: Negative.    HENT: Negative.     Eyes: Negative.    Respiratory: Negative.     Cardiovascular: Negative.    Endocrine: Negative.    Musculoskeletal: Negative.        Physical Exam:  BP (!) 172/80 (BP Location: Left arm, Patient Position: Sitting, Cuff Size: Large)   Pulse 66   Wt 73 kg (161 lb)   SpO2 97%   BMI 28.98 kg/m²   Physical Exam  Constitutional:       General:  "She is not in acute distress.     Appearance: She is not ill-appearing, toxic-appearing or diaphoretic.   HENT:      Head: Normocephalic.      Mouth/Throat:      Mouth: Mucous membranes are moist.      Pharynx: No oropharyngeal exudate or posterior oropharyngeal erythema.   Neck:      Vascular: No carotid bruit.   Cardiovascular:      Rate and Rhythm: Normal rate and regular rhythm.      Heart sounds: No murmur heard.     No friction rub. No gallop.   Pulmonary:      Effort: Pulmonary effort is normal. No respiratory distress.      Breath sounds: No stridor. No wheezing or rhonchi.   Musculoskeletal:         General: No swelling, tenderness, deformity or signs of injury. Normal range of motion.      Cervical back: Normal range of motion. No rigidity or tenderness.   Lymphadenopathy:      Cervical: No cervical adenopathy.   Neurological:      Mental Status: She is alert.         This note was completed in part utilizing [employer] direct voice recognition software.   Grammatical errors, random word insertion, spelling mistakes, occasional wrong word or \"sound-alike\" substitutions and incomplete sentences may be an occasional consequence of the system secondary to software limitations, ambient noise and hardware issues. At the time of dictation, efforts were made to edit, clarify and /or correct errors.  Please read the chart carefully and recognize, using context, where substitutions have occurred.  If you have any questions or concerns about the context, text or information contained within the body of this dictation, please contact myself, the provider, for further clarification.  "

## 2025-04-08 DIAGNOSIS — I10 ESSENTIAL HYPERTENSION: ICD-10-CM

## 2025-04-09 RX ORDER — HYDROCHLOROTHIAZIDE 12.5 MG/1
25 CAPSULE ORAL EVERY MORNING
Qty: 180 CAPSULE | Refills: 1 | Status: SHIPPED | OUTPATIENT
Start: 2025-04-09 | End: 2026-10-01

## 2025-07-10 ENCOUNTER — HOSPITAL ENCOUNTER (OUTPATIENT)
Dept: MAMMOGRAPHY | Facility: HOSPITAL | Age: 75
Discharge: HOME | End: 2025-07-10
Attending: FAMILY MEDICINE
Payer: COMMERCIAL

## 2025-07-10 VITALS — WEIGHT: 161 LBS | BODY MASS INDEX: 28.53 KG/M2 | HEIGHT: 63 IN

## 2025-07-10 DIAGNOSIS — Z12.31 OTHER SCREENING MAMMOGRAM: ICD-10-CM

## 2025-07-10 PROCEDURE — 77063 BREAST TOMOSYNTHESIS BI: CPT

## 2025-07-10 PROCEDURE — 77067 SCR MAMMO BI INCL CAD: CPT

## 2025-07-14 DIAGNOSIS — E78.2 MIXED HYPERLIPIDEMIA: ICD-10-CM

## 2025-07-15 RX ORDER — PRAVASTATIN SODIUM 20 MG
20 TABLET ORAL DAILY
Qty: 90 TABLET | Refills: 1 | Status: SHIPPED | OUTPATIENT
Start: 2025-07-15

## 2025-07-17 DIAGNOSIS — R32 URINARY INCONTINENCE, UNSPECIFIED TYPE: ICD-10-CM

## 2025-07-18 RX ORDER — MIRABEGRON 50 MG/1
50 TABLET, FILM COATED, EXTENDED RELEASE ORAL DAILY
Qty: 90 TABLET | Refills: 1 | Status: SHIPPED | OUTPATIENT
Start: 2025-07-18

## 2025-08-06 DIAGNOSIS — L21.9 SEBORRHEA: ICD-10-CM

## 2025-08-07 RX ORDER — CLOBETASOL PROPIONATE 0.5 MG/G
OINTMENT TOPICAL 2 TIMES WEEKLY
Qty: 30 G | Refills: 1 | Status: SHIPPED | OUTPATIENT
Start: 2025-08-07 | End: 2025-08-11 | Stop reason: SDUPTHER

## 2025-08-11 ENCOUNTER — TELEPHONE (OUTPATIENT)
Dept: FAMILY MEDICINE CLINIC | Facility: CLINIC | Age: 75
End: 2025-08-11

## 2025-08-14 ENCOUNTER — PATIENT MESSAGE (OUTPATIENT)
Dept: FAMILY MEDICINE CLINIC | Facility: CLINIC | Age: 75
End: 2025-08-14

## 2025-08-14 ENCOUNTER — OFFICE VISIT (OUTPATIENT)
Dept: CARDIOLOGY CLINIC | Facility: CLINIC | Age: 75
End: 2025-08-14
Payer: COMMERCIAL

## 2025-08-15 ENCOUNTER — TELEPHONE (OUTPATIENT)
Dept: FAMILY MEDICINE CLINIC | Facility: CLINIC | Age: 75
End: 2025-08-15

## (undated) DEVICE — CATH EP 7FR DI-DIR 10-POLE DEFL CS 2-8-2 FJ

## (undated) DEVICE — CABLE CATHETER ACHIEVE MAPPING

## (undated) DEVICE — GUIDE SHEATH SLO 8.5 FR

## (undated) DEVICE — CABLE CRYOCATH ELECTRICAL UMBILICAL

## (undated) DEVICE — NEEDLE TRANSSEPTAL BRK-1 71CM

## (undated) DEVICE — CATH EP  INQUIRY 6F 2-5-2MM  MED CRV STERABLE  DECAPOLAR 110CM

## (undated) DEVICE — CATH COAXIAL UMBILICAL

## (undated) DEVICE — PINNACLE INTRODUCER SHEATH: Brand: PINNACLE

## (undated) DEVICE — REF PATCH ENSITE X

## (undated) DEVICE — Device: Brand: PROTRACK PIGTAIL WIRE

## (undated) DEVICE — CATH ABLATION FLEXCATH ADVANCE OD12 FR STEERABLE

## (undated) DEVICE — INTRO SHEATH 8FR 24CM ARROW-FLEX

## (undated) DEVICE — CATH ABLATION CRYOCATH ARCTIC FRONT ADV PRO 28MM

## (undated) DEVICE — CATH EP ACHIEVE 20 MM MAPPING LOOP

## (undated) DEVICE — DGW .035 FC J3MM 150CM TEF: Brand: EMERALD

## (undated) DEVICE — CATH ULTRASOUND ACUNAV ICE 8FR 90CM GE VIVID-I